# Patient Record
Sex: FEMALE | Race: WHITE | Employment: FULL TIME | URBAN - METROPOLITAN AREA
[De-identification: names, ages, dates, MRNs, and addresses within clinical notes are randomized per-mention and may not be internally consistent; named-entity substitution may affect disease eponyms.]

---

## 2017-01-16 RX ORDER — VENLAFAXINE 100 MG/1
TABLET ORAL
Qty: 60 TABLET | Refills: 0 | Status: SHIPPED | OUTPATIENT
Start: 2017-01-16 | End: 2017-02-13 | Stop reason: SDUPTHER

## 2017-01-27 ENCOUNTER — NURSE ONLY (OUTPATIENT)
Dept: FAMILY MEDICINE CLINIC | Age: 72
End: 2017-01-27

## 2017-01-27 DIAGNOSIS — D50.8 OTHER IRON DEFICIENCY ANEMIA: Primary | ICD-10-CM

## 2017-01-27 PROCEDURE — 96372 THER/PROPH/DIAG INJ SC/IM: CPT | Performed by: FAMILY MEDICINE

## 2017-01-27 RX ORDER — CYANOCOBALAMIN 1000 UG/ML
1000 INJECTION INTRAMUSCULAR; SUBCUTANEOUS
Status: DISCONTINUED | OUTPATIENT
Start: 2017-01-27 | End: 2019-11-05 | Stop reason: ALTCHOICE

## 2017-01-27 RX ADMIN — CYANOCOBALAMIN 1000 MCG: 1000 INJECTION INTRAMUSCULAR; SUBCUTANEOUS at 11:35

## 2017-03-03 ENCOUNTER — NURSE ONLY (OUTPATIENT)
Dept: FAMILY MEDICINE CLINIC | Age: 72
End: 2017-03-03

## 2017-03-03 DIAGNOSIS — Z98.84 S/P GASTRIC BYPASS: Primary | ICD-10-CM

## 2017-03-03 PROCEDURE — 96372 THER/PROPH/DIAG INJ SC/IM: CPT | Performed by: FAMILY MEDICINE

## 2017-03-03 RX ORDER — CYANOCOBALAMIN 1000 UG/ML
1000 INJECTION INTRAMUSCULAR; SUBCUTANEOUS
Status: DISCONTINUED | OUTPATIENT
Start: 2017-03-03 | End: 2019-11-05 | Stop reason: ALTCHOICE

## 2017-03-03 RX ADMIN — CYANOCOBALAMIN 1000 MCG: 1000 INJECTION INTRAMUSCULAR; SUBCUTANEOUS at 11:45

## 2017-03-10 ENCOUNTER — TELEPHONE (OUTPATIENT)
Dept: FAMILY MEDICINE CLINIC | Age: 72
End: 2017-03-10

## 2017-03-10 RX ORDER — AMOXICILLIN 875 MG/1
875 TABLET, COATED ORAL 2 TIMES DAILY
Qty: 20 TABLET | Refills: 0 | Status: SHIPPED | OUTPATIENT
Start: 2017-03-10 | End: 2017-03-20

## 2017-04-07 ENCOUNTER — NURSE ONLY (OUTPATIENT)
Dept: FAMILY MEDICINE CLINIC | Age: 72
End: 2017-04-07

## 2017-04-07 DIAGNOSIS — Z98.84 S/P GASTRIC BYPASS: Primary | ICD-10-CM

## 2017-04-07 PROCEDURE — 96372 THER/PROPH/DIAG INJ SC/IM: CPT | Performed by: FAMILY MEDICINE

## 2017-04-07 RX ORDER — CYANOCOBALAMIN 1000 UG/ML
1000 INJECTION INTRAMUSCULAR; SUBCUTANEOUS
Status: DISCONTINUED | OUTPATIENT
Start: 2017-04-07 | End: 2019-11-05 | Stop reason: ALTCHOICE

## 2017-04-07 RX ADMIN — CYANOCOBALAMIN 1000 MCG: 1000 INJECTION INTRAMUSCULAR; SUBCUTANEOUS at 11:29

## 2017-05-05 ENCOUNTER — NURSE ONLY (OUTPATIENT)
Dept: FAMILY MEDICINE CLINIC | Age: 72
End: 2017-05-05

## 2017-05-05 DIAGNOSIS — D50.8 OTHER IRON DEFICIENCY ANEMIA: Primary | ICD-10-CM

## 2017-05-05 PROCEDURE — 96372 THER/PROPH/DIAG INJ SC/IM: CPT | Performed by: FAMILY MEDICINE

## 2017-05-05 RX ORDER — CYANOCOBALAMIN 1000 UG/ML
1000 INJECTION INTRAMUSCULAR; SUBCUTANEOUS
Status: DISCONTINUED | OUTPATIENT
Start: 2017-05-05 | End: 2019-11-05 | Stop reason: ALTCHOICE

## 2017-05-05 RX ADMIN — CYANOCOBALAMIN 1000 MCG: 1000 INJECTION INTRAMUSCULAR; SUBCUTANEOUS at 11:42

## 2017-06-06 ENCOUNTER — OFFICE VISIT (OUTPATIENT)
Dept: FAMILY MEDICINE CLINIC | Age: 72
End: 2017-06-06

## 2017-06-06 VITALS
SYSTOLIC BLOOD PRESSURE: 110 MMHG | HEIGHT: 66 IN | HEART RATE: 109 BPM | BODY MASS INDEX: 26.21 KG/M2 | OXYGEN SATURATION: 97 % | WEIGHT: 163.1 LBS | DIASTOLIC BLOOD PRESSURE: 80 MMHG

## 2017-06-06 DIAGNOSIS — F32.A DEPRESSION, UNSPECIFIED DEPRESSION TYPE: ICD-10-CM

## 2017-06-06 DIAGNOSIS — D50.8 OTHER IRON DEFICIENCY ANEMIA: Primary | ICD-10-CM

## 2017-06-06 DIAGNOSIS — Z98.84 S/P GASTRIC BYPASS: ICD-10-CM

## 2017-06-06 DIAGNOSIS — R53.83 OTHER FATIGUE: ICD-10-CM

## 2017-06-06 PROCEDURE — 96372 THER/PROPH/DIAG INJ SC/IM: CPT | Performed by: FAMILY MEDICINE

## 2017-06-06 PROCEDURE — 99213 OFFICE O/P EST LOW 20 MIN: CPT | Performed by: FAMILY MEDICINE

## 2017-06-06 RX ORDER — CYANOCOBALAMIN 1000 UG/ML
1000 INJECTION INTRAMUSCULAR; SUBCUTANEOUS
Status: DISCONTINUED | OUTPATIENT
Start: 2017-06-06 | End: 2019-11-05 | Stop reason: ALTCHOICE

## 2017-06-06 RX ADMIN — CYANOCOBALAMIN 1000 MCG: 1000 INJECTION INTRAMUSCULAR; SUBCUTANEOUS at 10:56

## 2017-06-06 ASSESSMENT — ENCOUNTER SYMPTOMS
ABDOMINAL PAIN: 0
SORE THROAT: 0
VISUAL CHANGE: 0
COUGH: 0
NAUSEA: 0
CHANGE IN BOWEL HABIT: 0
VOMITING: 0
SWOLLEN GLANDS: 0

## 2017-11-27 ENCOUNTER — TELEPHONE (OUTPATIENT)
Dept: FAMILY MEDICINE CLINIC | Age: 72
End: 2017-11-27

## 2017-11-27 RX ORDER — VENLAFAXINE 100 MG/1
TABLET ORAL
Qty: 60 TABLET | Refills: 3 | Status: SHIPPED | OUTPATIENT
Start: 2017-11-27 | End: 2019-03-11

## 2017-11-27 NOTE — TELEPHONE ENCOUNTER
Patient is calling requesting a refill of venlafaxine (EFFEXOR) 100 MG tablet patient is out of her medication and her pharmacy has been telling her that they called this in 4 times.   Please advise  [place] [phone]

## 2017-12-27 ENCOUNTER — TELEPHONE (OUTPATIENT)
Dept: FAMILY MEDICINE CLINIC | Age: 72
End: 2017-12-27

## 2017-12-27 NOTE — TELEPHONE ENCOUNTER
Patient is calling stating that the manufacture of venlafaxine (EFFEXOR) 100 MG tablet and it is now making her sick. Patient would like to have a different medication be called in.  Please advise  Jony Jauregui [phone]

## 2018-01-03 ENCOUNTER — NURSE ONLY (OUTPATIENT)
Dept: FAMILY MEDICINE CLINIC | Age: 73
End: 2018-01-03

## 2018-01-03 DIAGNOSIS — D50.8 OTHER IRON DEFICIENCY ANEMIA: Primary | ICD-10-CM

## 2018-01-03 PROCEDURE — 96372 THER/PROPH/DIAG INJ SC/IM: CPT | Performed by: FAMILY MEDICINE

## 2018-01-03 RX ORDER — CYANOCOBALAMIN 1000 UG/ML
1000 INJECTION INTRAMUSCULAR; SUBCUTANEOUS
Status: DISCONTINUED | OUTPATIENT
Start: 2018-01-03 | End: 2019-11-05 | Stop reason: ALTCHOICE

## 2018-01-03 RX ADMIN — CYANOCOBALAMIN 1000 MCG: 1000 INJECTION INTRAMUSCULAR; SUBCUTANEOUS at 10:06

## 2018-02-02 ENCOUNTER — OFFICE VISIT (OUTPATIENT)
Dept: FAMILY MEDICINE CLINIC | Age: 73
End: 2018-02-02

## 2018-02-02 VITALS
OXYGEN SATURATION: 95 % | BODY MASS INDEX: 29.54 KG/M2 | SYSTOLIC BLOOD PRESSURE: 120 MMHG | HEART RATE: 64 BPM | DIASTOLIC BLOOD PRESSURE: 80 MMHG | HEIGHT: 66 IN | WEIGHT: 183.8 LBS

## 2018-02-02 DIAGNOSIS — Z00.00 WELL ADULT EXAM: Primary | ICD-10-CM

## 2018-02-02 DIAGNOSIS — Z98.84 S/P GASTRIC BYPASS: ICD-10-CM

## 2018-02-02 DIAGNOSIS — D50.9 IRON DEFICIENCY ANEMIA, UNSPECIFIED IRON DEFICIENCY ANEMIA TYPE: ICD-10-CM

## 2018-02-02 DIAGNOSIS — F32.5 MAJOR DEPRESSIVE DISORDER WITH SINGLE EPISODE, IN FULL REMISSION (HCC): ICD-10-CM

## 2018-02-02 DIAGNOSIS — Z87.891 EX-SMOKER: ICD-10-CM

## 2018-02-02 LAB
A/G RATIO: 2 (ref 1.1–2.2)
ALBUMIN SERPL-MCNC: 4.4 G/DL (ref 3.4–5)
ALP BLD-CCNC: 63 U/L (ref 40–129)
ALT SERPL-CCNC: 11 U/L (ref 10–40)
ANION GAP SERPL CALCULATED.3IONS-SCNC: 12 MMOL/L (ref 3–16)
AST SERPL-CCNC: 21 U/L (ref 15–37)
BASOPHILS ABSOLUTE: 0 K/UL (ref 0–0.2)
BASOPHILS RELATIVE PERCENT: 1.2 %
BILIRUB SERPL-MCNC: 0.3 MG/DL (ref 0–1)
BUN BLDV-MCNC: 12 MG/DL (ref 7–20)
CALCIUM SERPL-MCNC: 9 MG/DL (ref 8.3–10.6)
CHLORIDE BLD-SCNC: 103 MMOL/L (ref 99–110)
CO2: 28 MMOL/L (ref 21–32)
CREAT SERPL-MCNC: 0.7 MG/DL (ref 0.6–1.2)
EOSINOPHILS ABSOLUTE: 0 K/UL (ref 0–0.6)
EOSINOPHILS RELATIVE PERCENT: 0.7 %
FOLATE: 9.58 NG/ML (ref 4.78–24.2)
GFR AFRICAN AMERICAN: >60
GFR NON-AFRICAN AMERICAN: >60
GLOBULIN: 2.2 G/DL
GLUCOSE BLD-MCNC: 74 MG/DL (ref 70–99)
HCT VFR BLD CALC: 34.7 % (ref 36–48)
HEMOGLOBIN: 11.1 G/DL (ref 12–16)
IRON SATURATION: 11 % (ref 15–50)
IRON: 49 UG/DL (ref 37–145)
LYMPHOCYTES ABSOLUTE: 1.4 K/UL (ref 1–5.1)
LYMPHOCYTES RELATIVE PERCENT: 39 %
MCH RBC QN AUTO: 27.4 PG (ref 26–34)
MCHC RBC AUTO-ENTMCNC: 31.9 G/DL (ref 31–36)
MCV RBC AUTO: 85.9 FL (ref 80–100)
MONOCYTES ABSOLUTE: 0.5 K/UL (ref 0–1.3)
MONOCYTES RELATIVE PERCENT: 13.3 %
NEUTROPHILS ABSOLUTE: 1.6 K/UL (ref 1.7–7.7)
NEUTROPHILS RELATIVE PERCENT: 45.8 %
PDW BLD-RTO: 15.5 % (ref 12.4–15.4)
PLATELET # BLD: 322 K/UL (ref 135–450)
PMV BLD AUTO: 6.7 FL (ref 5–10.5)
POTASSIUM SERPL-SCNC: 5.1 MMOL/L (ref 3.5–5.1)
RBC # BLD: 4.04 M/UL (ref 4–5.2)
SODIUM BLD-SCNC: 143 MMOL/L (ref 136–145)
TOTAL IRON BINDING CAPACITY: 435 UG/DL (ref 260–445)
TOTAL PROTEIN: 6.6 G/DL (ref 6.4–8.2)
VITAMIN B-12: 285 PG/ML (ref 211–911)
WBC # BLD: 3.5 K/UL (ref 4–11)

## 2018-02-02 PROCEDURE — 99213 OFFICE O/P EST LOW 20 MIN: CPT | Performed by: FAMILY MEDICINE

## 2018-02-02 PROCEDURE — 96372 THER/PROPH/DIAG INJ SC/IM: CPT | Performed by: FAMILY MEDICINE

## 2018-02-02 RX ORDER — CYANOCOBALAMIN 1000 UG/ML
1000 INJECTION INTRAMUSCULAR; SUBCUTANEOUS
Status: DISCONTINUED | OUTPATIENT
Start: 2018-02-02 | End: 2019-11-05 | Stop reason: ALTCHOICE

## 2018-02-02 RX ADMIN — CYANOCOBALAMIN 1000 MCG: 1000 INJECTION INTRAMUSCULAR; SUBCUTANEOUS at 13:14

## 2018-02-02 ASSESSMENT — PATIENT HEALTH QUESTIONNAIRE - PHQ9
SUM OF ALL RESPONSES TO PHQ QUESTIONS 1-9: 0
2. FEELING DOWN, DEPRESSED OR HOPELESS: 0
1. LITTLE INTEREST OR PLEASURE IN DOING THINGS: 0
SUM OF ALL RESPONSES TO PHQ9 QUESTIONS 1 & 2: 0

## 2018-02-02 ASSESSMENT — ENCOUNTER SYMPTOMS
COUGH: 0
CHANGE IN BOWEL HABIT: 0
VISUAL CHANGE: 0
SORE THROAT: 0
VOMITING: 0
ABDOMINAL PAIN: 0
SWOLLEN GLANDS: 0
NAUSEA: 0

## 2018-02-05 ENCOUNTER — TELEPHONE (OUTPATIENT)
Dept: FAMILY MEDICINE CLINIC | Age: 73
End: 2018-02-05

## 2018-02-05 NOTE — TELEPHONE ENCOUNTER
[address]    PT missed Sari's call with test results. Please call again. OK to leave detailed voice mail with info. .the patient lives alone and there are no privacy concerns    Last seen 2/2/2018

## 2018-02-12 ENCOUNTER — HOSPITAL ENCOUNTER (OUTPATIENT)
Dept: CT IMAGING | Age: 73
Discharge: HOME | End: 2018-02-12
Attending: FAMILY MEDICINE | Admitting: FAMILY MEDICINE

## 2018-02-12 DIAGNOSIS — Z87.891 EX-SMOKER: ICD-10-CM

## 2018-02-12 DIAGNOSIS — Z87.891 PERSONAL HISTORY OF NICOTINE DEPENDENCE: ICD-10-CM

## 2018-03-02 ENCOUNTER — NURSE ONLY (OUTPATIENT)
Dept: FAMILY MEDICINE CLINIC | Age: 73
End: 2018-03-02

## 2018-03-02 DIAGNOSIS — D50.9 IRON DEFICIENCY ANEMIA, UNSPECIFIED IRON DEFICIENCY ANEMIA TYPE: Primary | ICD-10-CM

## 2018-03-02 DIAGNOSIS — R53.83 OTHER FATIGUE: ICD-10-CM

## 2018-03-02 PROCEDURE — 96372 THER/PROPH/DIAG INJ SC/IM: CPT | Performed by: FAMILY MEDICINE

## 2018-03-02 RX ORDER — CYANOCOBALAMIN 1000 UG/ML
1000 INJECTION INTRAMUSCULAR; SUBCUTANEOUS
Status: DISCONTINUED | OUTPATIENT
Start: 2018-03-02 | End: 2019-11-05 | Stop reason: ALTCHOICE

## 2018-03-02 RX ORDER — ESCITALOPRAM OXALATE 10 MG/1
10 TABLET ORAL DAILY
Qty: 30 TABLET | Refills: 3 | Status: SHIPPED | OUTPATIENT
Start: 2018-03-02 | End: 2018-07-03 | Stop reason: SDUPTHER

## 2018-03-02 RX ADMIN — CYANOCOBALAMIN 1000 MCG: 1000 INJECTION INTRAMUSCULAR; SUBCUTANEOUS at 11:01

## 2018-04-03 ENCOUNTER — NURSE ONLY (OUTPATIENT)
Dept: FAMILY MEDICINE CLINIC | Age: 73
End: 2018-04-03

## 2018-04-03 DIAGNOSIS — R53.83 OTHER FATIGUE: Primary | ICD-10-CM

## 2018-04-03 DIAGNOSIS — D50.9 IRON DEFICIENCY ANEMIA, UNSPECIFIED IRON DEFICIENCY ANEMIA TYPE: ICD-10-CM

## 2018-04-03 PROCEDURE — 96372 THER/PROPH/DIAG INJ SC/IM: CPT | Performed by: FAMILY MEDICINE

## 2018-04-03 RX ORDER — CYANOCOBALAMIN 1000 UG/ML
1000 INJECTION INTRAMUSCULAR; SUBCUTANEOUS
Status: DISCONTINUED | OUTPATIENT
Start: 2018-04-03 | End: 2019-11-05 | Stop reason: ALTCHOICE

## 2018-04-03 RX ADMIN — CYANOCOBALAMIN 1000 MCG: 1000 INJECTION INTRAMUSCULAR; SUBCUTANEOUS at 10:50

## 2018-04-11 ENCOUNTER — TELEPHONE (OUTPATIENT)
Dept: FAMILY MEDICINE CLINIC | Age: 73
End: 2018-04-11

## 2018-04-12 RX ORDER — SULFAMETHOXAZOLE AND TRIMETHOPRIM 800; 160 MG/1; MG/1
1 TABLET ORAL 2 TIMES DAILY
Qty: 6 TABLET | Refills: 0 | Status: SHIPPED | OUTPATIENT
Start: 2018-04-12 | End: 2018-04-15

## 2018-04-20 ENCOUNTER — TELEPHONE (OUTPATIENT)
Dept: CASE MANAGEMENT | Age: 73
End: 2018-04-20

## 2018-05-03 ENCOUNTER — NURSE ONLY (OUTPATIENT)
Dept: FAMILY MEDICINE CLINIC | Age: 73
End: 2018-05-03

## 2018-05-03 DIAGNOSIS — E53.8 B12 DEFICIENCY: Primary | ICD-10-CM

## 2018-05-03 PROCEDURE — 96372 THER/PROPH/DIAG INJ SC/IM: CPT | Performed by: FAMILY MEDICINE

## 2018-05-03 RX ORDER — CYANOCOBALAMIN 1000 UG/ML
1000 INJECTION INTRAMUSCULAR; SUBCUTANEOUS
Status: DISCONTINUED | OUTPATIENT
Start: 2018-05-03 | End: 2019-11-05 | Stop reason: ALTCHOICE

## 2018-05-03 RX ADMIN — CYANOCOBALAMIN 1000 MCG: 1000 INJECTION INTRAMUSCULAR; SUBCUTANEOUS at 10:26

## 2018-05-18 ENCOUNTER — CARE COORDINATION (OUTPATIENT)
Dept: CARE COORDINATION | Age: 73
End: 2018-05-18

## 2018-05-18 ENCOUNTER — OFFICE VISIT (OUTPATIENT)
Dept: FAMILY MEDICINE CLINIC | Age: 73
End: 2018-05-18

## 2018-05-18 VITALS
WEIGHT: 187.5 LBS | OXYGEN SATURATION: 95 % | HEIGHT: 66 IN | HEART RATE: 72 BPM | SYSTOLIC BLOOD PRESSURE: 120 MMHG | BODY MASS INDEX: 30.13 KG/M2 | DIASTOLIC BLOOD PRESSURE: 80 MMHG

## 2018-05-18 DIAGNOSIS — N30.01 ACUTE CYSTITIS WITH HEMATURIA: ICD-10-CM

## 2018-05-18 DIAGNOSIS — R73.9 HYPERGLYCEMIA: Primary | ICD-10-CM

## 2018-05-18 DIAGNOSIS — R35.0 URINARY FREQUENCY: ICD-10-CM

## 2018-05-18 PROCEDURE — 99213 OFFICE O/P EST LOW 20 MIN: CPT | Performed by: FAMILY MEDICINE

## 2018-05-18 PROCEDURE — 81000 URINALYSIS NONAUTO W/SCOPE: CPT | Performed by: FAMILY MEDICINE

## 2018-05-18 RX ORDER — CEPHALEXIN 500 MG/1
500 CAPSULE ORAL 3 TIMES DAILY
Qty: 21 CAPSULE | Refills: 0 | Status: SHIPPED | OUTPATIENT
Start: 2018-05-18 | End: 2018-05-20

## 2018-05-18 ASSESSMENT — ENCOUNTER SYMPTOMS
VOMITING: 0
NAUSEA: 0

## 2018-05-19 LAB
ANION GAP SERPL CALCULATED.3IONS-SCNC: 13 MMOL/L (ref 3–16)
BUN BLDV-MCNC: 13 MG/DL (ref 7–20)
CALCIUM SERPL-MCNC: 9 MG/DL (ref 8.3–10.6)
CHLORIDE BLD-SCNC: 103 MMOL/L (ref 99–110)
CO2: 26 MMOL/L (ref 21–32)
CREAT SERPL-MCNC: 0.7 MG/DL (ref 0.6–1.2)
ESTIMATED AVERAGE GLUCOSE: 111.2 MG/DL
GFR AFRICAN AMERICAN: >60
GFR NON-AFRICAN AMERICAN: >60
GLUCOSE BLD-MCNC: 99 MG/DL (ref 70–99)
HBA1C MFR BLD: 5.5 %
POTASSIUM SERPL-SCNC: 4.3 MMOL/L (ref 3.5–5.1)
SODIUM BLD-SCNC: 142 MMOL/L (ref 136–145)

## 2018-05-20 LAB
ORGANISM: ABNORMAL
URINE CULTURE, ROUTINE: ABNORMAL
URINE CULTURE, ROUTINE: ABNORMAL

## 2018-05-20 RX ORDER — CIPROFLOXACIN 500 MG/1
500 TABLET, FILM COATED ORAL 2 TIMES DAILY
Qty: 20 TABLET | Refills: 0 | Status: SHIPPED | OUTPATIENT
Start: 2018-05-20 | End: 2018-05-30

## 2018-06-01 ENCOUNTER — CARE COORDINATION (OUTPATIENT)
Dept: CARE COORDINATION | Age: 73
End: 2018-06-01

## 2018-06-04 ENCOUNTER — CARE COORDINATION (OUTPATIENT)
Dept: CARE COORDINATION | Age: 73
End: 2018-06-04

## 2018-06-04 ENCOUNTER — NURSE ONLY (OUTPATIENT)
Dept: FAMILY MEDICINE CLINIC | Age: 73
End: 2018-06-04

## 2018-06-04 DIAGNOSIS — E53.8 B12 DEFICIENCY: Primary | ICD-10-CM

## 2018-06-04 PROCEDURE — 96372 THER/PROPH/DIAG INJ SC/IM: CPT | Performed by: FAMILY MEDICINE

## 2018-06-04 RX ORDER — CYANOCOBALAMIN 1000 UG/ML
1000 INJECTION INTRAMUSCULAR; SUBCUTANEOUS ONCE
Status: COMPLETED | OUTPATIENT
Start: 2018-06-04 | End: 2018-06-04

## 2018-06-04 RX ADMIN — CYANOCOBALAMIN 1000 MCG: 1000 INJECTION INTRAMUSCULAR; SUBCUTANEOUS at 10:13

## 2018-07-02 ENCOUNTER — HOSPITAL ENCOUNTER (OUTPATIENT)
Dept: MAMMOGRAPHY | Age: 73
Discharge: HOME | End: 2018-07-02
Attending: FAMILY MEDICINE | Admitting: FAMILY MEDICINE

## 2018-07-02 DIAGNOSIS — Z12.31 VISIT FOR SCREENING MAMMOGRAM: ICD-10-CM

## 2018-07-03 RX ORDER — ESCITALOPRAM OXALATE 10 MG/1
10 TABLET ORAL DAILY
Qty: 30 TABLET | Refills: 0 | Status: SHIPPED | OUTPATIENT
Start: 2018-07-03 | End: 2018-07-31 | Stop reason: SDUPTHER

## 2018-07-13 ENCOUNTER — CARE COORDINATION (OUTPATIENT)
Dept: CARE COORDINATION | Age: 73
End: 2018-07-13

## 2018-07-13 NOTE — CARE COORDINATION
Ambulatory Care Coordination Note  7/13/2018  CM Risk Score: 0  Tabitha Mortality Risk Score: 6.55    ACC: Fay Lake, RN    Summary Note: Called and spoke with pt. Pt requesting to dc working with [facility]. Care Coordination Interventions    Program Enrollment:  Rising Risk  Referral from Primary Care Provider:  Yes  Suggested Interventions and Community Resources  Diabetes Education:  Completed (Comment: reports hypoglycemia symptoms at times)  Disease Association: In Process  Registered Dietician:  Declined  Zone Management Tools:  Completed         Goals Addressed     None          Prior to Admission medications    Medication Sig Start Date End Date Taking? Authorizing Provider   escitalopram (LEXAPRO) 10 MG tablet TAKE 1 TABLET BY MOUTH DAILY 7/3/18   Armando Cox MD   [facility] 100 MG tablet TAKE 1 TABLET BY MOUTH TWICE DAILY 11/27/17   Armando Cox MD       No future appointments.

## 2018-07-17 ENCOUNTER — NURSE ONLY (OUTPATIENT)
Dept: FAMILY MEDICINE CLINIC | Age: 73
End: 2018-07-17

## 2018-07-17 DIAGNOSIS — R53.83 OTHER FATIGUE: Primary | ICD-10-CM

## 2018-07-17 PROCEDURE — 96372 THER/PROPH/DIAG INJ SC/IM: CPT | Performed by: FAMILY MEDICINE

## 2018-07-17 RX ORDER — CYANOCOBALAMIN 1000 UG/ML
1000 INJECTION INTRAMUSCULAR; SUBCUTANEOUS
Status: SHIPPED | OUTPATIENT
Start: 2018-07-17 | End: 2019-07-12

## 2018-07-17 RX ADMIN — CYANOCOBALAMIN 1000 MCG: 1000 INJECTION INTRAMUSCULAR; SUBCUTANEOUS at 13:52

## 2018-07-31 RX ORDER — ESCITALOPRAM OXALATE 10 MG/1
10 TABLET ORAL DAILY
Qty: 30 TABLET | Refills: 5 | Status: SHIPPED | OUTPATIENT
Start: 2018-07-31 | End: 2018-08-01 | Stop reason: SDUPTHER

## 2018-08-01 RX ORDER — ESCITALOPRAM OXALATE 10 MG/1
10 TABLET ORAL DAILY
Qty: 90 TABLET | Refills: 3 | Status: SHIPPED | OUTPATIENT
Start: 2018-08-01 | End: 2019-08-02 | Stop reason: SDUPTHER

## 2018-08-15 ENCOUNTER — NURSE ONLY (OUTPATIENT)
Dept: FAMILY MEDICINE CLINIC | Age: 73
End: 2018-08-15

## 2018-08-15 DIAGNOSIS — D50.9 IRON DEFICIENCY ANEMIA, UNSPECIFIED IRON DEFICIENCY ANEMIA TYPE: Primary | ICD-10-CM

## 2018-08-15 PROCEDURE — 96372 THER/PROPH/DIAG INJ SC/IM: CPT | Performed by: FAMILY MEDICINE

## 2018-08-15 RX ORDER — CYANOCOBALAMIN 1000 UG/ML
1000 INJECTION INTRAMUSCULAR; SUBCUTANEOUS
Status: SHIPPED | OUTPATIENT
Start: 2018-08-15 | End: 2019-08-10

## 2018-08-15 RX ADMIN — CYANOCOBALAMIN 1000 MCG: 1000 INJECTION INTRAMUSCULAR; SUBCUTANEOUS at 10:33

## 2018-09-14 ENCOUNTER — NURSE ONLY (OUTPATIENT)
Dept: FAMILY MEDICINE CLINIC | Age: 73
End: 2018-09-14

## 2018-09-14 DIAGNOSIS — E53.8 B12 DEFICIENCY: Primary | ICD-10-CM

## 2018-09-14 PROCEDURE — 96372 THER/PROPH/DIAG INJ SC/IM: CPT | Performed by: FAMILY MEDICINE

## 2018-09-14 RX ORDER — CYANOCOBALAMIN 1000 UG/ML
1000 INJECTION INTRAMUSCULAR; SUBCUTANEOUS
Status: SHIPPED | OUTPATIENT
Start: 2018-09-14 | End: 2019-09-09

## 2018-09-14 RX ADMIN — CYANOCOBALAMIN 1000 MCG: 1000 INJECTION INTRAMUSCULAR; SUBCUTANEOUS at 11:33

## 2018-09-26 ENCOUNTER — TELEPHONE (OUTPATIENT)
Dept: CASE MANAGEMENT | Age: 73
End: 2018-09-26

## 2018-10-15 ENCOUNTER — NURSE ONLY (OUTPATIENT)
Dept: FAMILY MEDICINE CLINIC | Age: 73
End: 2018-10-15
Payer: MEDICARE

## 2018-10-15 DIAGNOSIS — D50.9 IRON DEFICIENCY ANEMIA, UNSPECIFIED IRON DEFICIENCY ANEMIA TYPE: Primary | ICD-10-CM

## 2018-10-15 PROCEDURE — 96372 THER/PROPH/DIAG INJ SC/IM: CPT | Performed by: FAMILY MEDICINE

## 2018-10-15 RX ORDER — CYANOCOBALAMIN 1000 UG/ML
1000 INJECTION INTRAMUSCULAR; SUBCUTANEOUS
Status: DISCONTINUED | OUTPATIENT
Start: 2018-10-15 | End: 2019-11-05

## 2018-10-15 RX ADMIN — CYANOCOBALAMIN 1000 MCG: 1000 INJECTION INTRAMUSCULAR; SUBCUTANEOUS at 10:42

## 2018-11-12 ENCOUNTER — NURSE ONLY (OUTPATIENT)
Dept: FAMILY MEDICINE CLINIC | Age: 73
End: 2018-11-12
Payer: MEDICARE

## 2018-11-12 PROCEDURE — 96372 THER/PROPH/DIAG INJ SC/IM: CPT | Performed by: FAMILY MEDICINE

## 2018-11-12 RX ADMIN — CYANOCOBALAMIN 1000 MCG: 1000 INJECTION INTRAMUSCULAR; SUBCUTANEOUS at 13:20

## 2019-01-09 ENCOUNTER — OFFICE VISIT (OUTPATIENT)
Dept: FAMILY MEDICINE CLINIC | Age: 74
End: 2019-01-09
Payer: MEDICARE

## 2019-01-09 VITALS
HEART RATE: 72 BPM | SYSTOLIC BLOOD PRESSURE: 134 MMHG | DIASTOLIC BLOOD PRESSURE: 76 MMHG | OXYGEN SATURATION: 97 % | BODY MASS INDEX: 31.47 KG/M2 | WEIGHT: 195 LBS

## 2019-01-09 DIAGNOSIS — F32.5 MAJOR DEPRESSIVE DISORDER, SINGLE EPISODE, IN FULL REMISSION (HCC): ICD-10-CM

## 2019-01-09 DIAGNOSIS — F32.A DEPRESSION, UNSPECIFIED DEPRESSION TYPE: ICD-10-CM

## 2019-01-09 DIAGNOSIS — R53.83 OTHER FATIGUE: ICD-10-CM

## 2019-01-09 PROCEDURE — 99213 OFFICE O/P EST LOW 20 MIN: CPT | Performed by: FAMILY MEDICINE

## 2019-01-09 PROCEDURE — 96372 THER/PROPH/DIAG INJ SC/IM: CPT | Performed by: FAMILY MEDICINE

## 2019-01-09 RX ORDER — CYANOCOBALAMIN 1000 UG/ML
1000 INJECTION INTRAMUSCULAR; SUBCUTANEOUS ONCE
Status: COMPLETED | OUTPATIENT
Start: 2019-01-09 | End: 2019-01-09

## 2019-01-09 RX ADMIN — CYANOCOBALAMIN 1000 MCG: 1000 INJECTION INTRAMUSCULAR; SUBCUTANEOUS at 12:17

## 2019-01-09 ASSESSMENT — ENCOUNTER SYMPTOMS
CHANGE IN BOWEL HABIT: 0
NAUSEA: 0
SORE THROAT: 0
ABDOMINAL PAIN: 0
VISUAL CHANGE: 0
VOMITING: 0
SWOLLEN GLANDS: 0
COUGH: 0

## 2019-02-11 ENCOUNTER — OFFICE VISIT (OUTPATIENT)
Dept: ENT CLINIC | Age: 74
End: 2019-02-11
Payer: MEDICARE

## 2019-02-11 ENCOUNTER — NURSE ONLY (OUTPATIENT)
Dept: FAMILY MEDICINE CLINIC | Age: 74
End: 2019-02-11
Payer: MEDICARE

## 2019-02-11 VITALS
WEIGHT: 194.85 LBS | DIASTOLIC BLOOD PRESSURE: 92 MMHG | HEIGHT: 70 IN | HEART RATE: 84 BPM | BODY MASS INDEX: 27.89 KG/M2 | SYSTOLIC BLOOD PRESSURE: 149 MMHG

## 2019-02-11 DIAGNOSIS — H60.392 OTHER INFECTIVE OTITIS EXTERNA OF LEFT EAR, UNSPECIFIED CHRONICITY: ICD-10-CM

## 2019-02-11 DIAGNOSIS — J31.0 CHRONIC RHINITIS: Primary | ICD-10-CM

## 2019-02-11 DIAGNOSIS — D50.9 IRON DEFICIENCY ANEMIA, UNSPECIFIED IRON DEFICIENCY ANEMIA TYPE: Primary | ICD-10-CM

## 2019-02-11 DIAGNOSIS — H69.82 DYSFUNCTION OF LEFT EUSTACHIAN TUBE: ICD-10-CM

## 2019-02-11 PROCEDURE — 96372 THER/PROPH/DIAG INJ SC/IM: CPT | Performed by: FAMILY MEDICINE

## 2019-02-11 PROCEDURE — 99204 OFFICE O/P NEW MOD 45 MIN: CPT | Performed by: OTOLARYNGOLOGY

## 2019-02-11 RX ORDER — CYANOCOBALAMIN 1000 UG/ML
1000 INJECTION INTRAMUSCULAR; SUBCUTANEOUS
Status: DISCONTINUED | OUTPATIENT
Start: 2019-02-11 | End: 2019-11-05 | Stop reason: ALTCHOICE

## 2019-02-11 RX ADMIN — CYANOCOBALAMIN 1000 MCG: 1000 INJECTION INTRAMUSCULAR; SUBCUTANEOUS at 10:32

## 2019-02-11 ASSESSMENT — ENCOUNTER SYMPTOMS
EYE DISCHARGE: 0
WHEEZING: 0
DIARRHEA: 0
BACK PAIN: 0
COUGH: 0
VOICE CHANGE: 0
VOMITING: 0
SINUS PAIN: 0
TROUBLE SWALLOWING: 0
SHORTNESS OF BREATH: 0
RHINORRHEA: 0
COLOR CHANGE: 0
STRIDOR: 0
SINUS PRESSURE: 0
SORE THROAT: 0
FACIAL SWELLING: 0
BLOOD IN STOOL: 0
CHEST TIGHTNESS: 0
CONSTIPATION: 0
CHOKING: 0
EYE PAIN: 0
APNEA: 0
NAUSEA: 0

## 2019-02-12 ENCOUNTER — TELEPHONE (OUTPATIENT)
Dept: ENT CLINIC | Age: 74
End: 2019-02-12

## 2019-02-12 DIAGNOSIS — H60.92 OTITIS EXTERNA OF LEFT EAR, UNSPECIFIED CHRONICITY, UNSPECIFIED TYPE: Primary | ICD-10-CM

## 2019-02-12 RX ORDER — NEOMYCIN SULFATE, POLYMYXIN B SULFATE AND HYDROCORTISONE 10; 3.5; 1 MG/ML; MG/ML; [USP'U]/ML
3 SUSPENSION/ DROPS AURICULAR (OTIC) 3 TIMES DAILY
Qty: 1 BOTTLE | Refills: 0 | Status: SHIPPED | OUTPATIENT
Start: 2019-02-12 | End: 2019-02-19

## 2019-02-20 ENCOUNTER — TELEPHONE (OUTPATIENT)
Dept: CASE MANAGEMENT | Age: 74
End: 2019-02-20

## 2019-03-11 ENCOUNTER — OFFICE VISIT (OUTPATIENT)
Dept: FAMILY MEDICINE CLINIC | Age: 74
End: 2019-03-11
Payer: MEDICARE

## 2019-03-11 VITALS
HEIGHT: 68 IN | DIASTOLIC BLOOD PRESSURE: 84 MMHG | HEART RATE: 84 BPM | SYSTOLIC BLOOD PRESSURE: 154 MMHG | BODY MASS INDEX: 30.89 KG/M2 | WEIGHT: 203.8 LBS | OXYGEN SATURATION: 97 %

## 2019-03-11 DIAGNOSIS — R53.83 OTHER FATIGUE: Primary | ICD-10-CM

## 2019-03-11 PROCEDURE — 96372 THER/PROPH/DIAG INJ SC/IM: CPT | Performed by: FAMILY MEDICINE

## 2019-04-11 ENCOUNTER — NURSE ONLY (OUTPATIENT)
Dept: FAMILY MEDICINE CLINIC | Age: 74
End: 2019-04-11
Payer: MEDICARE

## 2019-04-11 DIAGNOSIS — R53.82 CHRONIC FATIGUE: Primary | ICD-10-CM

## 2019-04-11 PROCEDURE — 96372 THER/PROPH/DIAG INJ SC/IM: CPT | Performed by: FAMILY MEDICINE

## 2019-04-11 RX ORDER — CYANOCOBALAMIN 1000 UG/ML
1000 INJECTION INTRAMUSCULAR; SUBCUTANEOUS ONCE
Status: COMPLETED | OUTPATIENT
Start: 2019-04-11 | End: 2019-04-11

## 2019-04-11 RX ADMIN — CYANOCOBALAMIN 1000 MCG: 1000 INJECTION INTRAMUSCULAR; SUBCUTANEOUS at 10:25

## 2019-05-16 ENCOUNTER — NURSE ONLY (OUTPATIENT)
Dept: FAMILY MEDICINE CLINIC | Age: 74
End: 2019-05-16
Payer: MEDICARE

## 2019-05-16 DIAGNOSIS — E53.8 B12 DEFICIENCY: Primary | ICD-10-CM

## 2019-05-16 PROCEDURE — 96372 THER/PROPH/DIAG INJ SC/IM: CPT | Performed by: FAMILY MEDICINE

## 2019-05-16 RX ORDER — CYANOCOBALAMIN 1000 UG/ML
1000 INJECTION INTRAMUSCULAR; SUBCUTANEOUS ONCE
Status: COMPLETED | OUTPATIENT
Start: 2019-05-16 | End: 2019-05-16

## 2019-05-16 RX ADMIN — CYANOCOBALAMIN 1000 MCG: 1000 INJECTION INTRAMUSCULAR; SUBCUTANEOUS at 10:27

## 2019-06-14 ENCOUNTER — NURSE ONLY (OUTPATIENT)
Dept: FAMILY MEDICINE CLINIC | Age: 74
End: 2019-06-14
Payer: MEDICARE

## 2019-06-14 DIAGNOSIS — E53.8 B12 DEFICIENCY: Primary | ICD-10-CM

## 2019-06-14 PROCEDURE — 96372 THER/PROPH/DIAG INJ SC/IM: CPT | Performed by: FAMILY MEDICINE

## 2019-06-14 RX ORDER — CYANOCOBALAMIN 1000 UG/ML
1000 INJECTION INTRAMUSCULAR; SUBCUTANEOUS ONCE
Status: CANCELLED | OUTPATIENT
Start: 2019-06-14 | End: 2019-06-14

## 2019-06-14 RX ADMIN — CYANOCOBALAMIN 1000 MCG: 1000 INJECTION INTRAMUSCULAR; SUBCUTANEOUS at 09:34

## 2019-08-03 RX ORDER — ESCITALOPRAM OXALATE 10 MG/1
10 TABLET ORAL DAILY
Qty: 90 TABLET | Refills: 0 | Status: SHIPPED | OUTPATIENT
Start: 2019-08-03 | End: 2019-10-29 | Stop reason: SDUPTHER

## 2019-08-26 ENCOUNTER — NURSE ONLY (OUTPATIENT)
Dept: FAMILY MEDICINE CLINIC | Age: 74
End: 2019-08-26
Payer: MEDICARE

## 2019-08-26 DIAGNOSIS — E53.8 B12 DEFICIENCY: Primary | ICD-10-CM

## 2019-08-26 DIAGNOSIS — R53.83 OTHER FATIGUE: ICD-10-CM

## 2019-08-26 PROCEDURE — 96372 THER/PROPH/DIAG INJ SC/IM: CPT | Performed by: FAMILY MEDICINE

## 2019-08-26 RX ORDER — CYANOCOBALAMIN 1000 UG/ML
1000 INJECTION INTRAMUSCULAR; SUBCUTANEOUS ONCE
Status: COMPLETED | OUTPATIENT
Start: 2019-08-26 | End: 2019-08-26

## 2019-08-26 RX ADMIN — CYANOCOBALAMIN 1000 MCG: 1000 INJECTION INTRAMUSCULAR; SUBCUTANEOUS at 10:03

## 2019-09-16 ENCOUNTER — TELEPHONE (OUTPATIENT)
Dept: FAMILY MEDICINE CLINIC | Age: 74
End: 2019-09-16

## 2019-09-16 RX ORDER — FLUCONAZOLE 150 MG/1
150 TABLET ORAL ONCE
Qty: 2 TABLET | Refills: 0 | Status: SHIPPED | OUTPATIENT
Start: 2019-09-16 | End: 2019-10-10 | Stop reason: SDUPTHER

## 2019-09-24 ENCOUNTER — NURSE ONLY (OUTPATIENT)
Dept: FAMILY MEDICINE CLINIC | Age: 74
End: 2019-09-24
Payer: MEDICARE

## 2019-09-24 DIAGNOSIS — E53.8 B12 DEFICIENCY: Primary | ICD-10-CM

## 2019-09-24 PROCEDURE — 96372 THER/PROPH/DIAG INJ SC/IM: CPT | Performed by: FAMILY MEDICINE

## 2019-09-24 RX ORDER — CYANOCOBALAMIN 1000 UG/ML
1000 INJECTION INTRAMUSCULAR; SUBCUTANEOUS
Status: DISCONTINUED | OUTPATIENT
Start: 2019-09-24 | End: 2019-11-05 | Stop reason: ALTCHOICE

## 2019-09-24 RX ADMIN — CYANOCOBALAMIN 1000 MCG: 1000 INJECTION INTRAMUSCULAR; SUBCUTANEOUS at 09:58

## 2019-09-24 ASSESSMENT — PATIENT HEALTH QUESTIONNAIRE - PHQ9
2. FEELING DOWN, DEPRESSED OR HOPELESS: 0
1. LITTLE INTEREST OR PLEASURE IN DOING THINGS: 0
SUM OF ALL RESPONSES TO PHQ9 QUESTIONS 1 & 2: 0
SUM OF ALL RESPONSES TO PHQ QUESTIONS 1-9: 0
SUM OF ALL RESPONSES TO PHQ QUESTIONS 1-9: 0

## 2019-10-10 ENCOUNTER — OFFICE VISIT (OUTPATIENT)
Dept: FAMILY MEDICINE CLINIC | Age: 74
End: 2019-10-10
Payer: MEDICARE

## 2019-10-10 ENCOUNTER — OFFICE VISIT (OUTPATIENT)
Dept: ENT CLINIC | Age: 74
End: 2019-10-10
Payer: MEDICARE

## 2019-10-10 VITALS
BODY MASS INDEX: 31.15 KG/M2 | DIASTOLIC BLOOD PRESSURE: 74 MMHG | WEIGHT: 193 LBS | SYSTOLIC BLOOD PRESSURE: 126 MMHG | OXYGEN SATURATION: 98 % | HEART RATE: 73 BPM

## 2019-10-10 VITALS
TEMPERATURE: 97.5 F | HEART RATE: 76 BPM | HEIGHT: 66 IN | BODY MASS INDEX: 31.15 KG/M2 | WEIGHT: 193.8 LBS | SYSTOLIC BLOOD PRESSURE: 153 MMHG | DIASTOLIC BLOOD PRESSURE: 90 MMHG

## 2019-10-10 DIAGNOSIS — B37.31 YEAST VAGINITIS: Primary | ICD-10-CM

## 2019-10-10 DIAGNOSIS — H61.302 STENOSIS OF LEFT EXTERNAL AUDITORY CANAL: ICD-10-CM

## 2019-10-10 PROCEDURE — 99212 OFFICE O/P EST SF 10 MIN: CPT | Performed by: OTOLARYNGOLOGY

## 2019-10-10 PROCEDURE — 99213 OFFICE O/P EST LOW 20 MIN: CPT | Performed by: FAMILY MEDICINE

## 2019-10-10 RX ORDER — FLUCONAZOLE 150 MG/1
150 TABLET ORAL ONCE
Qty: 2 TABLET | Refills: 0 | Status: SHIPPED | OUTPATIENT
Start: 2019-10-10 | End: 2019-10-10

## 2019-10-10 ASSESSMENT — ENCOUNTER SYMPTOMS
SORE THROAT: 0
RHINORRHEA: 0
COUGH: 0
VOICE CHANGE: 0
CHOKING: 0
FACIAL SWELLING: 0
EYE PAIN: 0
EYE REDNESS: 0
SINUS PRESSURE: 0
NAUSEA: 0
SHORTNESS OF BREATH: 0
SINUS PAIN: 0
DIARRHEA: 0
EYE ITCHING: 0
TROUBLE SWALLOWING: 0

## 2019-10-17 ENCOUNTER — APPOINTMENT (OUTPATIENT)
Dept: CT IMAGING | Age: 74
End: 2019-10-17
Payer: MEDICARE

## 2019-10-17 ENCOUNTER — HOSPITAL ENCOUNTER (EMERGENCY)
Age: 74
Discharge: HOME | End: 2019-10-17
Attending: EMERGENCY MEDICINE
Payer: MEDICARE

## 2019-10-17 ENCOUNTER — APPOINTMENT (OUTPATIENT)
Dept: MRI IMAGING | Age: 74
End: 2019-10-17
Payer: MEDICARE

## 2019-10-17 VITALS
SYSTOLIC BLOOD PRESSURE: 163 MMHG | DIASTOLIC BLOOD PRESSURE: 83 MMHG | RESPIRATION RATE: 26 BRPM | OXYGEN SATURATION: 95 % | BODY MASS INDEX: 32.28 KG/M2 | HEART RATE: 91 BPM | WEIGHT: 200 LBS

## 2019-10-17 DIAGNOSIS — M54.2 NECK PAIN: Primary | ICD-10-CM

## 2019-10-17 LAB
EKG ATRIAL RATE: 87 BPM
EKG DIAGNOSIS: NORMAL
EKG P AXIS: 26 DEGREES
EKG P-R INTERVAL: 138 MS
EKG Q-T INTERVAL: 360 MS
EKG QRS DURATION: 86 MS
EKG QTC CALCULATION (BAZETT): 433 MS
EKG R AXIS: -12 DEGREES
EKG T AXIS: 9 DEGREES
EKG VENTRICULAR RATE: 87 BPM

## 2019-10-17 PROCEDURE — 6370000000 HC RX 637 (ALT 250 FOR IP): Performed by: STUDENT IN AN ORGANIZED HEALTH CARE EDUCATION/TRAINING PROGRAM

## 2019-10-17 PROCEDURE — 96374 THER/PROPH/DIAG INJ IV PUSH: CPT

## 2019-10-17 PROCEDURE — 72125 CT NECK SPINE W/O DYE: CPT

## 2019-10-17 PROCEDURE — 99284 EMERGENCY DEPT VISIT MOD MDM: CPT

## 2019-10-17 PROCEDURE — 72141 MRI NECK SPINE W/O DYE: CPT

## 2019-10-17 PROCEDURE — 6360000002 HC RX W HCPCS: Performed by: STUDENT IN AN ORGANIZED HEALTH CARE EDUCATION/TRAINING PROGRAM

## 2019-10-17 PROCEDURE — 93005 ELECTROCARDIOGRAM TRACING: CPT | Performed by: EMERGENCY MEDICINE

## 2019-10-17 RX ORDER — HYDROCODONE BITARTRATE AND ACETAMINOPHEN 5; 325 MG/1; MG/1
1 TABLET ORAL ONCE
Status: COMPLETED | OUTPATIENT
Start: 2019-10-17 | End: 2019-10-17

## 2019-10-17 RX ORDER — METHOCARBAMOL 500 MG/1
500 TABLET, FILM COATED ORAL 4 TIMES DAILY
Qty: 16 TABLET | Refills: 0 | Status: SHIPPED | OUTPATIENT
Start: 2019-10-17 | End: 2019-10-21

## 2019-10-17 RX ORDER — KETOROLAC TROMETHAMINE 30 MG/ML
15 INJECTION, SOLUTION INTRAMUSCULAR; INTRAVENOUS ONCE
Status: COMPLETED | OUTPATIENT
Start: 2019-10-17 | End: 2019-10-17

## 2019-10-17 RX ADMIN — KETOROLAC TROMETHAMINE 15 MG: 30 INJECTION, SOLUTION INTRAMUSCULAR at 14:34

## 2019-10-17 RX ADMIN — HYDROCODONE BITARTRATE AND ACETAMINOPHEN 1 TABLET: 5; 325 TABLET ORAL at 20:03

## 2019-10-17 ASSESSMENT — PAIN SCALES - GENERAL
PAINLEVEL_OUTOF10: 8

## 2019-10-17 ASSESSMENT — PAIN DESCRIPTION - LOCATION: LOCATION: NECK

## 2019-10-17 ASSESSMENT — PAIN DESCRIPTION - PAIN TYPE: TYPE: ACUTE PAIN

## 2019-10-18 ENCOUNTER — TELEPHONE (OUTPATIENT)
Dept: FAMILY MEDICINE CLINIC | Age: 74
End: 2019-10-18

## 2019-10-18 RX ORDER — NAPROXEN 500 MG/1
500 TABLET ORAL 2 TIMES DAILY WITH MEALS
Qty: 30 TABLET | Refills: 3 | Status: SHIPPED | OUTPATIENT
Start: 2019-10-18 | End: 2019-10-28 | Stop reason: SINTOL

## 2019-10-28 ENCOUNTER — OFFICE VISIT (OUTPATIENT)
Dept: FAMILY MEDICINE CLINIC | Age: 74
End: 2019-10-28
Payer: MEDICARE

## 2019-10-28 VITALS
BODY MASS INDEX: 30.44 KG/M2 | OXYGEN SATURATION: 98 % | SYSTOLIC BLOOD PRESSURE: 120 MMHG | DIASTOLIC BLOOD PRESSURE: 80 MMHG | HEART RATE: 72 BPM | WEIGHT: 189.4 LBS | HEIGHT: 66 IN

## 2019-10-28 DIAGNOSIS — E53.8 B12 DEFICIENCY: ICD-10-CM

## 2019-10-28 DIAGNOSIS — Z78.0 MENOPAUSE: ICD-10-CM

## 2019-10-28 DIAGNOSIS — Z01.818 PRE-OP EXAM: ICD-10-CM

## 2019-10-28 DIAGNOSIS — Z01.818 PRE-OP EXAM: Primary | ICD-10-CM

## 2019-10-28 DIAGNOSIS — M48.42XG: ICD-10-CM

## 2019-10-28 LAB
A/G RATIO: 2.4 (ref 1.1–2.2)
ALBUMIN SERPL-MCNC: 4.5 G/DL (ref 3.4–5)
ALP BLD-CCNC: 75 U/L (ref 40–129)
ALT SERPL-CCNC: 13 U/L (ref 10–40)
ANION GAP SERPL CALCULATED.3IONS-SCNC: 12 MMOL/L (ref 3–16)
AST SERPL-CCNC: 22 U/L (ref 15–37)
BASOPHILS ABSOLUTE: 0 K/UL (ref 0–0.2)
BASOPHILS RELATIVE PERCENT: 1.1 %
BILIRUB SERPL-MCNC: 0.3 MG/DL (ref 0–1)
BUN BLDV-MCNC: 16 MG/DL (ref 7–20)
CALCIUM SERPL-MCNC: 9.2 MG/DL (ref 8.3–10.6)
CHLORIDE BLD-SCNC: 102 MMOL/L (ref 99–110)
CO2: 25 MMOL/L (ref 21–32)
CREAT SERPL-MCNC: 0.6 MG/DL (ref 0.6–1.2)
EOSINOPHILS ABSOLUTE: 0.1 K/UL (ref 0–0.6)
EOSINOPHILS RELATIVE PERCENT: 2.4 %
GFR AFRICAN AMERICAN: >60
GFR NON-AFRICAN AMERICAN: >60
GLOBULIN: 1.9 G/DL
GLUCOSE BLD-MCNC: 105 MG/DL (ref 70–99)
HCT VFR BLD CALC: 34.3 % (ref 36–48)
HEMOGLOBIN: 11 G/DL (ref 12–16)
LYMPHOCYTES ABSOLUTE: 1.1 K/UL (ref 1–5.1)
LYMPHOCYTES RELATIVE PERCENT: 32.6 %
MCH RBC QN AUTO: 27.2 PG (ref 26–34)
MCHC RBC AUTO-ENTMCNC: 32.2 G/DL (ref 31–36)
MCV RBC AUTO: 84.5 FL (ref 80–100)
MONOCYTES ABSOLUTE: 0.4 K/UL (ref 0–1.3)
MONOCYTES RELATIVE PERCENT: 12.6 %
NEUTROPHILS ABSOLUTE: 1.8 K/UL (ref 1.7–7.7)
NEUTROPHILS RELATIVE PERCENT: 51.3 %
PDW BLD-RTO: 16.5 % (ref 12.4–15.4)
PLATELET # BLD: 369 K/UL (ref 135–450)
PMV BLD AUTO: 7.1 FL (ref 5–10.5)
POTASSIUM SERPL-SCNC: 4.8 MMOL/L (ref 3.5–5.1)
RBC # BLD: 4.06 M/UL (ref 4–5.2)
SODIUM BLD-SCNC: 139 MMOL/L (ref 136–145)
TOTAL PROTEIN: 6.4 G/DL (ref 6.4–8.2)
WBC # BLD: 3.5 K/UL (ref 4–11)

## 2019-10-28 PROCEDURE — 99214 OFFICE O/P EST MOD 30 MIN: CPT | Performed by: FAMILY MEDICINE

## 2019-10-28 PROCEDURE — 96372 THER/PROPH/DIAG INJ SC/IM: CPT | Performed by: FAMILY MEDICINE

## 2019-10-28 RX ORDER — CYANOCOBALAMIN 1000 UG/ML
1000 INJECTION INTRAMUSCULAR; SUBCUTANEOUS
Status: DISCONTINUED | OUTPATIENT
Start: 2019-10-28 | End: 2019-11-05

## 2019-10-28 RX ORDER — HYDROCODONE BITARTRATE AND ACETAMINOPHEN 5; 325 MG/1; MG/1
1 TABLET ORAL EVERY 6 HOURS PRN
Status: ON HOLD | COMMUNITY
End: 2019-11-08 | Stop reason: HOSPADM

## 2019-10-28 RX ADMIN — CYANOCOBALAMIN 1000 MCG: 1000 INJECTION INTRAMUSCULAR; SUBCUTANEOUS at 11:36

## 2019-10-29 RX ORDER — ESCITALOPRAM OXALATE 10 MG/1
10 TABLET ORAL DAILY
Qty: 90 TABLET | Refills: 0 | Status: SHIPPED | OUTPATIENT
Start: 2019-10-29 | End: 2020-01-31

## 2019-11-04 ENCOUNTER — HOSPITAL ENCOUNTER (OUTPATIENT)
Dept: GENERAL RADIOLOGY | Age: 74
Discharge: HOME | End: 2019-11-04
Payer: MEDICARE

## 2019-11-04 DIAGNOSIS — Z78.0 MENOPAUSE: ICD-10-CM

## 2019-11-04 PROCEDURE — 77080 DXA BONE DENSITY AXIAL: CPT

## 2019-11-06 ENCOUNTER — ANESTHESIA EVENT (OUTPATIENT)
Dept: OPERATING ROOM | Age: 74
DRG: 473 | End: 2019-11-06
Payer: MEDICARE

## 2019-11-07 ENCOUNTER — APPOINTMENT (OUTPATIENT)
Dept: CT IMAGING | Age: 74
DRG: 473 | End: 2019-11-07
Attending: NEUROLOGICAL SURGERY
Payer: MEDICARE

## 2019-11-07 ENCOUNTER — ANESTHESIA (OUTPATIENT)
Dept: OPERATING ROOM | Age: 74
DRG: 473 | End: 2019-11-07
Payer: MEDICARE

## 2019-11-07 ENCOUNTER — HOSPITAL ENCOUNTER (INPATIENT)
Age: 74
LOS: 2 days | Discharge: HOME | DRG: 473 | End: 2019-11-09
Attending: NEUROLOGICAL SURGERY | Admitting: NEUROLOGICAL SURGERY
Payer: MEDICARE

## 2019-11-07 VITALS — SYSTOLIC BLOOD PRESSURE: 112 MMHG | OXYGEN SATURATION: 96 % | DIASTOLIC BLOOD PRESSURE: 57 MMHG | TEMPERATURE: 94.5 F

## 2019-11-07 DIAGNOSIS — Z98.1 S/P CERVICAL SPINAL FUSION: Primary | ICD-10-CM

## 2019-11-07 PROBLEM — S12.100A ODONTOID FRACTURE, CLOSED, INITIAL ENCOUNTER (HCC): Status: ACTIVE | Noted: 2019-11-07

## 2019-11-07 LAB
ABO/RH: NORMAL
ANTIBODY SCREEN: NORMAL
APTT: 30.9 SEC (ref 26–36)
INR BLD: 1.01 (ref 0.86–1.14)
PROTHROMBIN TIME: 11.5 SEC (ref 9.8–13)

## 2019-11-07 PROCEDURE — 0RG1071 FUSION OF CERVICAL VERTEBRAL JOINT WITH AUTOLOGOUS TISSUE SUBSTITUTE, POSTERIOR APPROACH, POSTERIOR COLUMN, OPEN APPROACH: ICD-10-PCS | Performed by: NEUROLOGICAL SURGERY

## 2019-11-07 PROCEDURE — 2580000003 HC RX 258: Performed by: PHYSICIAN ASSISTANT

## 2019-11-07 PROCEDURE — 6360000002 HC RX W HCPCS: Performed by: NURSE ANESTHETIST, CERTIFIED REGISTERED

## 2019-11-07 PROCEDURE — 6360000002 HC RX W HCPCS: Performed by: NEUROLOGICAL SURGERY

## 2019-11-07 PROCEDURE — 7100000000 HC PACU RECOVERY - FIRST 15 MIN: Performed by: NEUROLOGICAL SURGERY

## 2019-11-07 PROCEDURE — 3600000004 HC SURGERY LEVEL 4 BASE: Performed by: NEUROLOGICAL SURGERY

## 2019-11-07 PROCEDURE — 3600000014 HC SURGERY LEVEL 4 ADDTL 15MIN: Performed by: NEUROLOGICAL SURGERY

## 2019-11-07 PROCEDURE — 3700000001 HC ADD 15 MINUTES (ANESTHESIA): Performed by: NEUROLOGICAL SURGERY

## 2019-11-07 PROCEDURE — 6360000002 HC RX W HCPCS: Performed by: PHYSICIAN ASSISTANT

## 2019-11-07 PROCEDURE — 2580000003 HC RX 258: Performed by: NURSE ANESTHETIST, CERTIFIED REGISTERED

## 2019-11-07 PROCEDURE — 86850 RBC ANTIBODY SCREEN: CPT

## 2019-11-07 PROCEDURE — 1200000000 HC SEMI PRIVATE

## 2019-11-07 PROCEDURE — 2500000003 HC RX 250 WO HCPCS: Performed by: NURSE ANESTHETIST, CERTIFIED REGISTERED

## 2019-11-07 PROCEDURE — 86901 BLOOD TYPING SEROLOGIC RH(D): CPT

## 2019-11-07 PROCEDURE — 2580000003 HC RX 258: Performed by: NEUROLOGICAL SURGERY

## 2019-11-07 PROCEDURE — 85610 PROTHROMBIN TIME: CPT

## 2019-11-07 PROCEDURE — 3700000000 HC ANESTHESIA ATTENDED CARE: Performed by: NEUROLOGICAL SURGERY

## 2019-11-07 PROCEDURE — 2709999900 HC NON-CHARGEABLE SUPPLY: Performed by: NEUROLOGICAL SURGERY

## 2019-11-07 PROCEDURE — 86900 BLOOD TYPING SEROLOGIC ABO: CPT

## 2019-11-07 PROCEDURE — 2500000003 HC RX 250 WO HCPCS: Performed by: ANESTHESIOLOGY

## 2019-11-07 PROCEDURE — 2580000003 HC RX 258: Performed by: ANESTHESIOLOGY

## 2019-11-07 PROCEDURE — 2500000003 HC RX 250 WO HCPCS: Performed by: NEUROLOGICAL SURGERY

## 2019-11-07 PROCEDURE — 2720000010 HC SURG SUPPLY STERILE: Performed by: NEUROLOGICAL SURGERY

## 2019-11-07 PROCEDURE — C1713 ANCHOR/SCREW BN/BN,TIS/BN: HCPCS | Performed by: NEUROLOGICAL SURGERY

## 2019-11-07 PROCEDURE — 7100000001 HC PACU RECOVERY - ADDTL 15 MIN: Performed by: NEUROLOGICAL SURGERY

## 2019-11-07 PROCEDURE — 6370000000 HC RX 637 (ALT 250 FOR IP): Performed by: PHYSICIAN ASSISTANT

## 2019-11-07 PROCEDURE — 6360000002 HC RX W HCPCS: Performed by: ANESTHESIOLOGY

## 2019-11-07 PROCEDURE — 85730 THROMBOPLASTIN TIME PARTIAL: CPT

## 2019-11-07 PROCEDURE — 77011 CT SCAN FOR LOCALIZATION: CPT

## 2019-11-07 DEVICE — SCREW SPNL OCCIPITOCERVICOTHORACIC TI INNR FOR 3.5MM ROD: Type: IMPLANTABLE DEVICE | Site: SPINE CERVICAL | Status: FUNCTIONAL

## 2019-11-07 DEVICE — IMPLANTABLE DEVICE: Type: IMPLANTABLE DEVICE | Status: FUNCTIONAL

## 2019-11-07 DEVICE — IMPLANTABLE DEVICE: Type: IMPLANTABLE DEVICE | Site: SPINE CERVICAL | Status: FUNCTIONAL

## 2019-11-07 RX ORDER — ONDANSETRON 2 MG/ML
4 INJECTION INTRAMUSCULAR; INTRAVENOUS
Status: DISCONTINUED | OUTPATIENT
Start: 2019-11-07 | End: 2019-11-07 | Stop reason: HOSPADM

## 2019-11-07 RX ORDER — SODIUM CHLORIDE 0.9 % (FLUSH) 0.9 %
10 SYRINGE (ML) INJECTION EVERY 12 HOURS SCHEDULED
Status: DISCONTINUED | OUTPATIENT
Start: 2019-11-07 | End: 2019-11-07 | Stop reason: HOSPADM

## 2019-11-07 RX ORDER — DOCUSATE SODIUM 100 MG/1
100 CAPSULE, LIQUID FILLED ORAL 2 TIMES DAILY
Status: DISCONTINUED | OUTPATIENT
Start: 2019-11-07 | End: 2019-11-08

## 2019-11-07 RX ORDER — HYDROMORPHONE HCL 110MG/55ML
PATIENT CONTROLLED ANALGESIA SYRINGE INTRAVENOUS PRN
Status: DISCONTINUED | OUTPATIENT
Start: 2019-11-07 | End: 2019-11-07 | Stop reason: SDUPTHER

## 2019-11-07 RX ORDER — PROPOFOL 10 MG/ML
INJECTION, EMULSION INTRAVENOUS CONTINUOUS PRN
Status: DISCONTINUED | OUTPATIENT
Start: 2019-11-07 | End: 2019-11-07 | Stop reason: SDUPTHER

## 2019-11-07 RX ORDER — LIDOCAINE HYDROCHLORIDE 10 MG/ML
1 INJECTION, SOLUTION EPIDURAL; INFILTRATION; INTRACAUDAL; PERINEURAL
Status: DISCONTINUED | OUTPATIENT
Start: 2019-11-07 | End: 2019-11-07 | Stop reason: HOSPADM

## 2019-11-07 RX ORDER — FENTANYL CITRATE 50 UG/ML
INJECTION, SOLUTION INTRAMUSCULAR; INTRAVENOUS PRN
Status: DISCONTINUED | OUTPATIENT
Start: 2019-11-07 | End: 2019-11-07 | Stop reason: SDUPTHER

## 2019-11-07 RX ORDER — OXYCODONE HYDROCHLORIDE AND ACETAMINOPHEN 5; 325 MG/1; MG/1
1 TABLET ORAL
Status: DISCONTINUED | OUTPATIENT
Start: 2019-11-07 | End: 2019-11-07 | Stop reason: HOSPADM

## 2019-11-07 RX ORDER — ONDANSETRON 2 MG/ML
4 INJECTION INTRAMUSCULAR; INTRAVENOUS EVERY 6 HOURS PRN
Status: DISCONTINUED | OUTPATIENT
Start: 2019-11-07 | End: 2019-11-09 | Stop reason: HOSPADM

## 2019-11-07 RX ORDER — SODIUM CHLORIDE, SODIUM LACTATE, POTASSIUM CHLORIDE, CALCIUM CHLORIDE 600; 310; 30; 20 MG/100ML; MG/100ML; MG/100ML; MG/100ML
INJECTION, SOLUTION INTRAVENOUS CONTINUOUS
Status: DISCONTINUED | OUTPATIENT
Start: 2019-11-07 | End: 2019-11-08

## 2019-11-07 RX ORDER — PROPOFOL 10 MG/ML
INJECTION, EMULSION INTRAVENOUS PRN
Status: DISCONTINUED | OUTPATIENT
Start: 2019-11-07 | End: 2019-11-07 | Stop reason: SDUPTHER

## 2019-11-07 RX ORDER — FENTANYL CITRATE 50 UG/ML
25 INJECTION, SOLUTION INTRAMUSCULAR; INTRAVENOUS EVERY 5 MIN PRN
Status: DISCONTINUED | OUTPATIENT
Start: 2019-11-07 | End: 2019-11-07 | Stop reason: HOSPADM

## 2019-11-07 RX ORDER — ONDANSETRON 2 MG/ML
INJECTION INTRAMUSCULAR; INTRAVENOUS PRN
Status: DISCONTINUED | OUTPATIENT
Start: 2019-11-07 | End: 2019-11-07 | Stop reason: SDUPTHER

## 2019-11-07 RX ORDER — DEXAMETHASONE SODIUM PHOSPHATE 4 MG/ML
INJECTION, SOLUTION INTRA-ARTICULAR; INTRALESIONAL; INTRAMUSCULAR; INTRAVENOUS; SOFT TISSUE PRN
Status: DISCONTINUED | OUTPATIENT
Start: 2019-11-07 | End: 2019-11-07 | Stop reason: SDUPTHER

## 2019-11-07 RX ORDER — HYDRALAZINE HYDROCHLORIDE 20 MG/ML
5 INJECTION INTRAMUSCULAR; INTRAVENOUS EVERY 10 MIN PRN
Status: DISCONTINUED | OUTPATIENT
Start: 2019-11-07 | End: 2019-11-07 | Stop reason: HOSPADM

## 2019-11-07 RX ORDER — SODIUM CHLORIDE 9 MG/ML
INJECTION, SOLUTION INTRAVENOUS CONTINUOUS
Status: DISCONTINUED | OUTPATIENT
Start: 2019-11-07 | End: 2019-11-08

## 2019-11-07 RX ORDER — OXYCODONE HYDROCHLORIDE 5 MG/1
5 TABLET ORAL EVERY 4 HOURS PRN
Status: DISCONTINUED | OUTPATIENT
Start: 2019-11-07 | End: 2019-11-08

## 2019-11-07 RX ORDER — LABETALOL 20 MG/4 ML (5 MG/ML) INTRAVENOUS SYRINGE
5 EVERY 10 MIN PRN
Status: DISCONTINUED | OUTPATIENT
Start: 2019-11-07 | End: 2019-11-07 | Stop reason: HOSPADM

## 2019-11-07 RX ORDER — SODIUM CHLORIDE 0.9 % (FLUSH) 0.9 %
10 SYRINGE (ML) INJECTION PRN
Status: DISCONTINUED | OUTPATIENT
Start: 2019-11-07 | End: 2019-11-09 | Stop reason: HOSPADM

## 2019-11-07 RX ORDER — FENTANYL CITRATE 50 UG/ML
50 INJECTION, SOLUTION INTRAMUSCULAR; INTRAVENOUS EVERY 5 MIN PRN
Status: DISCONTINUED | OUTPATIENT
Start: 2019-11-07 | End: 2019-11-07 | Stop reason: HOSPADM

## 2019-11-07 RX ORDER — SODIUM CHLORIDE 0.9 % (FLUSH) 0.9 %
10 SYRINGE (ML) INJECTION PRN
Status: DISCONTINUED | OUTPATIENT
Start: 2019-11-07 | End: 2019-11-07 | Stop reason: HOSPADM

## 2019-11-07 RX ORDER — SUCCINYLCHOLINE/SOD CL,ISO/PF 200MG/10ML
SYRINGE (ML) INTRAVENOUS PRN
Status: DISCONTINUED | OUTPATIENT
Start: 2019-11-07 | End: 2019-11-07 | Stop reason: SDUPTHER

## 2019-11-07 RX ORDER — PROMETHAZINE HYDROCHLORIDE 25 MG/ML
6.25 INJECTION, SOLUTION INTRAMUSCULAR; INTRAVENOUS
Status: DISCONTINUED | OUTPATIENT
Start: 2019-11-07 | End: 2019-11-07 | Stop reason: HOSPADM

## 2019-11-07 RX ORDER — DIAZEPAM 5 MG/1
5 TABLET ORAL EVERY 6 HOURS PRN
Status: DISCONTINUED | OUTPATIENT
Start: 2019-11-07 | End: 2019-11-09 | Stop reason: HOSPADM

## 2019-11-07 RX ORDER — SODIUM CHLORIDE, SODIUM LACTATE, POTASSIUM CHLORIDE, CALCIUM CHLORIDE 600; 310; 30; 20 MG/100ML; MG/100ML; MG/100ML; MG/100ML
INJECTION, SOLUTION INTRAVENOUS CONTINUOUS
Status: DISCONTINUED | OUTPATIENT
Start: 2019-11-07 | End: 2019-11-07

## 2019-11-07 RX ORDER — SODIUM CHLORIDE 0.9 % (FLUSH) 0.9 %
10 SYRINGE (ML) INJECTION EVERY 12 HOURS SCHEDULED
Status: DISCONTINUED | OUTPATIENT
Start: 2019-11-07 | End: 2019-11-09 | Stop reason: HOSPADM

## 2019-11-07 RX ORDER — ESCITALOPRAM OXALATE 10 MG/1
10 TABLET ORAL DAILY
Status: DISCONTINUED | OUTPATIENT
Start: 2019-11-07 | End: 2019-11-09 | Stop reason: HOSPADM

## 2019-11-07 RX ORDER — LIDOCAINE HYDROCHLORIDE 20 MG/ML
INJECTION, SOLUTION INTRAVENOUS PRN
Status: DISCONTINUED | OUTPATIENT
Start: 2019-11-07 | End: 2019-11-07 | Stop reason: SDUPTHER

## 2019-11-07 RX ORDER — MIDAZOLAM HYDROCHLORIDE 1 MG/ML
INJECTION INTRAMUSCULAR; INTRAVENOUS PRN
Status: DISCONTINUED | OUTPATIENT
Start: 2019-11-07 | End: 2019-11-07 | Stop reason: SDUPTHER

## 2019-11-07 RX ORDER — FENTANYL CITRATE 50 UG/ML
INJECTION, SOLUTION INTRAMUSCULAR; INTRAVENOUS PRN
Status: DISCONTINUED | OUTPATIENT
Start: 2019-11-07 | End: 2019-11-07

## 2019-11-07 RX ADMIN — LIDOCAINE HYDROCHLORIDE 50 MG: 20 INJECTION, SOLUTION INTRAVENOUS at 12:12

## 2019-11-07 RX ADMIN — PROPOFOL 50 MG: 10 INJECTION, EMULSION INTRAVENOUS at 12:35

## 2019-11-07 RX ADMIN — METHOCARBAMOL 1000 MG: 100 INJECTION, SOLUTION INTRAMUSCULAR; INTRAVENOUS at 15:42

## 2019-11-07 RX ADMIN — HYDROMORPHONE HYDROCHLORIDE 0.5 MG: 1 INJECTION, SOLUTION INTRAMUSCULAR; INTRAVENOUS; SUBCUTANEOUS at 15:30

## 2019-11-07 RX ADMIN — SODIUM CHLORIDE, SODIUM LACTATE, POTASSIUM CHLORIDE, AND CALCIUM CHLORIDE: 600; 310; 30; 20 INJECTION, SOLUTION INTRAVENOUS at 12:04

## 2019-11-07 RX ADMIN — PHENYLEPHRINE HYDROCHLORIDE 40 MCG: 10 INJECTION, SOLUTION INTRAMUSCULAR; INTRAVENOUS; SUBCUTANEOUS at 13:54

## 2019-11-07 RX ADMIN — Medication 100 MG: at 12:12

## 2019-11-07 RX ADMIN — OXYCODONE HYDROCHLORIDE 5 MG: 5 TABLET ORAL at 17:49

## 2019-11-07 RX ADMIN — HYDROMORPHONE HYDROCHLORIDE 0.25 MG: 2 INJECTION, SOLUTION INTRAMUSCULAR; INTRAVENOUS; SUBCUTANEOUS at 14:44

## 2019-11-07 RX ADMIN — HYDROMORPHONE HYDROCHLORIDE 0.5 MG: 1 INJECTION, SOLUTION INTRAMUSCULAR; INTRAVENOUS; SUBCUTANEOUS at 16:58

## 2019-11-07 RX ADMIN — Medication 2 G: at 12:42

## 2019-11-07 RX ADMIN — MIDAZOLAM HYDROCHLORIDE 2 MG: 2 INJECTION, SOLUTION INTRAMUSCULAR; INTRAVENOUS at 12:02

## 2019-11-07 RX ADMIN — HYDROMORPHONE HYDROCHLORIDE 0.5 MG: 1 INJECTION, SOLUTION INTRAMUSCULAR; INTRAVENOUS; SUBCUTANEOUS at 20:23

## 2019-11-07 RX ADMIN — SODIUM CHLORIDE, SODIUM LACTATE, POTASSIUM CHLORIDE, AND CALCIUM CHLORIDE: 600; 310; 30; 20 INJECTION, SOLUTION INTRAVENOUS at 10:30

## 2019-11-07 RX ADMIN — PHENYLEPHRINE HYDROCHLORIDE 40 MCG: 10 INJECTION, SOLUTION INTRAMUSCULAR; INTRAVENOUS; SUBCUTANEOUS at 13:13

## 2019-11-07 RX ADMIN — FENTANYL CITRATE 50 MCG: 50 INJECTION, SOLUTION INTRAMUSCULAR; INTRAVENOUS at 15:55

## 2019-11-07 RX ADMIN — REMIFENTANIL HYDROCHLORIDE 0.1 MCG/KG/MIN: 1 INJECTION, POWDER, LYOPHILIZED, FOR SOLUTION INTRAVENOUS at 12:35

## 2019-11-07 RX ADMIN — Medication 10 ML: at 20:23

## 2019-11-07 RX ADMIN — ESCITALOPRAM OXALATE 10 MG: 10 TABLET ORAL at 17:49

## 2019-11-07 RX ADMIN — SODIUM CHLORIDE, POTASSIUM CHLORIDE, SODIUM LACTATE AND CALCIUM CHLORIDE: 600; 310; 30; 20 INJECTION, SOLUTION INTRAVENOUS at 10:30

## 2019-11-07 RX ADMIN — DIAZEPAM 5 MG: 5 TABLET ORAL at 20:23

## 2019-11-07 RX ADMIN — PROPOFOL 100 MG: 10 INJECTION, EMULSION INTRAVENOUS at 12:12

## 2019-11-07 RX ADMIN — PROPOFOL 150 MCG/KG/MIN: 10 INJECTION, EMULSION INTRAVENOUS at 12:35

## 2019-11-07 RX ADMIN — PHENYLEPHRINE HYDROCHLORIDE 80 MCG: 10 INJECTION, SOLUTION INTRAMUSCULAR; INTRAVENOUS; SUBCUTANEOUS at 12:47

## 2019-11-07 RX ADMIN — FENTANYL CITRATE 100 MCG: 50 INJECTION INTRAMUSCULAR; INTRAVENOUS at 12:09

## 2019-11-07 RX ADMIN — DEXAMETHASONE SODIUM PHOSPHATE 10 MG: 4 INJECTION, SOLUTION INTRAMUSCULAR; INTRAVENOUS at 14:29

## 2019-11-07 RX ADMIN — PROPOFOL 50 MG: 10 INJECTION, EMULSION INTRAVENOUS at 12:29

## 2019-11-07 RX ADMIN — FENTANYL CITRATE 50 MCG: 50 INJECTION, SOLUTION INTRAMUSCULAR; INTRAVENOUS at 15:48

## 2019-11-07 RX ADMIN — HYDROMORPHONE HYDROCHLORIDE 0.5 MG: 1 INJECTION, SOLUTION INTRAMUSCULAR; INTRAVENOUS; SUBCUTANEOUS at 16:32

## 2019-11-07 RX ADMIN — ONDANSETRON 4 MG: 2 INJECTION INTRAMUSCULAR; INTRAVENOUS at 14:30

## 2019-11-07 RX ADMIN — PROPOFOL 100 MG: 10 INJECTION, EMULSION INTRAVENOUS at 12:24

## 2019-11-07 RX ADMIN — METHOCARBAMOL 1000 MG: 100 INJECTION, SOLUTION INTRAMUSCULAR; INTRAVENOUS at 23:13

## 2019-11-07 RX ADMIN — PHENYLEPHRINE HYDROCHLORIDE 160 MCG: 10 INJECTION, SOLUTION INTRAMUSCULAR; INTRAVENOUS; SUBCUTANEOUS at 13:20

## 2019-11-07 RX ADMIN — PHENYLEPHRINE HYDROCHLORIDE 120 MCG: 10 INJECTION, SOLUTION INTRAMUSCULAR; INTRAVENOUS; SUBCUTANEOUS at 13:17

## 2019-11-07 RX ADMIN — PHENYLEPHRINE HYDROCHLORIDE 10 MCG/MIN: 10 INJECTION, SOLUTION INTRAMUSCULAR; INTRAVENOUS; SUBCUTANEOUS at 13:35

## 2019-11-07 RX ADMIN — HYDROMORPHONE HYDROCHLORIDE 0.5 MG: 1 INJECTION, SOLUTION INTRAMUSCULAR; INTRAVENOUS; SUBCUTANEOUS at 15:24

## 2019-11-07 RX ADMIN — DOCUSATE SODIUM 100 MG: 100 CAPSULE, LIQUID FILLED ORAL at 20:23

## 2019-11-07 RX ADMIN — SODIUM CHLORIDE: 9 INJECTION, SOLUTION INTRAVENOUS at 15:42

## 2019-11-07 ASSESSMENT — PULMONARY FUNCTION TESTS
PIF_VALUE: 4
PIF_VALUE: 3
PIF_VALUE: 16
PIF_VALUE: 3
PIF_VALUE: 15
PIF_VALUE: 16
PIF_VALUE: 16
PIF_VALUE: 15
PIF_VALUE: 3
PIF_VALUE: 1
PIF_VALUE: 15
PIF_VALUE: 16
PIF_VALUE: 15
PIF_VALUE: 16
PIF_VALUE: 16
PIF_VALUE: 1
PIF_VALUE: 15
PIF_VALUE: 16
PIF_VALUE: 16
PIF_VALUE: 3
PIF_VALUE: 16
PIF_VALUE: 4
PIF_VALUE: 4
PIF_VALUE: 16
PIF_VALUE: 3
PIF_VALUE: 16
PIF_VALUE: 16
PIF_VALUE: 15
PIF_VALUE: 2
PIF_VALUE: 15
PIF_VALUE: 19
PIF_VALUE: 16
PIF_VALUE: 16
PIF_VALUE: 14
PIF_VALUE: 14
PIF_VALUE: 16
PIF_VALUE: 1
PIF_VALUE: 16
PIF_VALUE: 15
PIF_VALUE: 4
PIF_VALUE: 15
PIF_VALUE: 16
PIF_VALUE: 15
PIF_VALUE: 16
PIF_VALUE: 16
PIF_VALUE: 15
PIF_VALUE: 16
PIF_VALUE: 17
PIF_VALUE: 15
PIF_VALUE: 15
PIF_VALUE: 0
PIF_VALUE: 16
PIF_VALUE: 16
PIF_VALUE: 15
PIF_VALUE: 15
PIF_VALUE: 2
PIF_VALUE: 3
PIF_VALUE: 15
PIF_VALUE: 14
PIF_VALUE: 16
PIF_VALUE: 3
PIF_VALUE: 15
PIF_VALUE: 15
PIF_VALUE: 16
PIF_VALUE: 15
PIF_VALUE: 16
PIF_VALUE: 4
PIF_VALUE: 16
PIF_VALUE: 16
PIF_VALUE: 13
PIF_VALUE: 16
PIF_VALUE: 15
PIF_VALUE: 15
PIF_VALUE: 5
PIF_VALUE: 16
PIF_VALUE: 15
PIF_VALUE: 16
PIF_VALUE: 15
PIF_VALUE: 16
PIF_VALUE: 16
PIF_VALUE: 14
PIF_VALUE: 15
PIF_VALUE: 1
PIF_VALUE: 16
PIF_VALUE: 3
PIF_VALUE: 15
PIF_VALUE: 16
PIF_VALUE: 4
PIF_VALUE: 16
PIF_VALUE: 16
PIF_VALUE: 15
PIF_VALUE: 15
PIF_VALUE: 16
PIF_VALUE: 15
PIF_VALUE: 26
PIF_VALUE: 16
PIF_VALUE: 16
PIF_VALUE: 0
PIF_VALUE: 16
PIF_VALUE: 16
PIF_VALUE: 6
PIF_VALUE: 16
PIF_VALUE: 16
PIF_VALUE: 15
PIF_VALUE: 16
PIF_VALUE: 16
PIF_VALUE: 14
PIF_VALUE: 16
PIF_VALUE: 24
PIF_VALUE: 15
PIF_VALUE: 16
PIF_VALUE: 16
PIF_VALUE: 15
PIF_VALUE: 16
PIF_VALUE: 15
PIF_VALUE: 16
PIF_VALUE: 15
PIF_VALUE: 16
PIF_VALUE: 15
PIF_VALUE: 4
PIF_VALUE: 15
PIF_VALUE: 14
PIF_VALUE: 15
PIF_VALUE: 14
PIF_VALUE: 16
PIF_VALUE: 5
PIF_VALUE: 4
PIF_VALUE: 1
PIF_VALUE: 3
PIF_VALUE: 16
PIF_VALUE: 4
PIF_VALUE: 15
PIF_VALUE: 3
PIF_VALUE: 1
PIF_VALUE: 12
PIF_VALUE: 15
PIF_VALUE: 16
PIF_VALUE: 14
PIF_VALUE: 2
PIF_VALUE: 4
PIF_VALUE: 16
PIF_VALUE: 14
PIF_VALUE: 15
PIF_VALUE: 16
PIF_VALUE: 20

## 2019-11-07 ASSESSMENT — PAIN SCALES - GENERAL
PAINLEVEL_OUTOF10: 7
PAINLEVEL_OUTOF10: 5
PAINLEVEL_OUTOF10: 7
PAINLEVEL_OUTOF10: 6
PAINLEVEL_OUTOF10: 8
PAINLEVEL_OUTOF10: 7
PAINLEVEL_OUTOF10: 7
PAINLEVEL_OUTOF10: 2
PAINLEVEL_OUTOF10: 7
PAINLEVEL_OUTOF10: 0
PAINLEVEL_OUTOF10: 7
PAINLEVEL_OUTOF10: 7

## 2019-11-07 ASSESSMENT — PAIN DESCRIPTION - PAIN TYPE
TYPE: SURGICAL PAIN

## 2019-11-07 ASSESSMENT — PAIN DESCRIPTION - LOCATION
LOCATION: NECK

## 2019-11-07 ASSESSMENT — LIFESTYLE VARIABLES: SMOKING_STATUS: 0

## 2019-11-07 ASSESSMENT — PAIN DESCRIPTION - FREQUENCY
FREQUENCY: CONTINUOUS
FREQUENCY: CONTINUOUS
FREQUENCY: INTERMITTENT
FREQUENCY: INTERMITTENT

## 2019-11-07 ASSESSMENT — PAIN DESCRIPTION - PROGRESSION
CLINICAL_PROGRESSION: GRADUALLY WORSENING
CLINICAL_PROGRESSION: GRADUALLY WORSENING
CLINICAL_PROGRESSION: GRADUALLY IMPROVING
CLINICAL_PROGRESSION: GRADUALLY WORSENING

## 2019-11-07 ASSESSMENT — PAIN DESCRIPTION - ONSET
ONSET: ON-GOING
ONSET: GRADUAL

## 2019-11-07 ASSESSMENT — PAIN - FUNCTIONAL ASSESSMENT
PAIN_FUNCTIONAL_ASSESSMENT: 0-10
PAIN_FUNCTIONAL_ASSESSMENT: ACTIVITIES ARE NOT PREVENTED

## 2019-11-07 ASSESSMENT — PAIN DESCRIPTION - ORIENTATION
ORIENTATION: POSTERIOR
ORIENTATION: POSTERIOR
ORIENTATION: POSTERIOR;UPPER
ORIENTATION: POSTERIOR

## 2019-11-07 ASSESSMENT — PAIN DESCRIPTION - DESCRIPTORS
DESCRIPTORS: ACHING;DISCOMFORT
DESCRIPTORS: DISCOMFORT;PENETRATING
DESCRIPTORS: ACHING;DISCOMFORT
DESCRIPTORS: ACHING;BURNING;DISCOMFORT
DESCRIPTORS: ACHING;DISCOMFORT

## 2019-11-08 PROBLEM — M81.0 AGE-RELATED OSTEOPOROSIS WITHOUT CURRENT PATHOLOGICAL FRACTURE: Status: ACTIVE | Noted: 2019-11-08

## 2019-11-08 LAB
HCT VFR BLD CALC: 30.2 % (ref 36–48)
HEMOGLOBIN: 9.8 G/DL (ref 12–16)
MCH RBC QN AUTO: 27.8 PG (ref 26–34)
MCHC RBC AUTO-ENTMCNC: 32.5 G/DL (ref 31–36)
MCV RBC AUTO: 85.4 FL (ref 80–100)
PDW BLD-RTO: 16.6 % (ref 12.4–15.4)
PLATELET # BLD: 338 K/UL (ref 135–450)
PMV BLD AUTO: 6.9 FL (ref 5–10.5)
RBC # BLD: 3.53 M/UL (ref 4–5.2)
WBC # BLD: 5.3 K/UL (ref 4–11)

## 2019-11-08 PROCEDURE — 6360000002 HC RX W HCPCS: Performed by: NURSE PRACTITIONER

## 2019-11-08 PROCEDURE — 6370000000 HC RX 637 (ALT 250 FOR IP): Performed by: PHYSICIAN ASSISTANT

## 2019-11-08 PROCEDURE — G0008 ADMIN INFLUENZA VIRUS VAC: HCPCS | Performed by: NEUROLOGICAL SURGERY

## 2019-11-08 PROCEDURE — 97161 PT EVAL LOW COMPLEX 20 MIN: CPT

## 2019-11-08 PROCEDURE — 90686 IIV4 VACC NO PRSV 0.5 ML IM: CPT | Performed by: NEUROLOGICAL SURGERY

## 2019-11-08 PROCEDURE — 97166 OT EVAL MOD COMPLEX 45 MIN: CPT

## 2019-11-08 PROCEDURE — 36415 COLL VENOUS BLD VENIPUNCTURE: CPT

## 2019-11-08 PROCEDURE — 6370000000 HC RX 637 (ALT 250 FOR IP): Performed by: NURSE PRACTITIONER

## 2019-11-08 PROCEDURE — 99231 SBSQ HOSP IP/OBS SF/LOW 25: CPT | Performed by: FAMILY MEDICINE

## 2019-11-08 PROCEDURE — 85027 COMPLETE CBC AUTOMATED: CPT

## 2019-11-08 PROCEDURE — 1200000000 HC SEMI PRIVATE

## 2019-11-08 PROCEDURE — 97116 GAIT TRAINING THERAPY: CPT

## 2019-11-08 PROCEDURE — 2580000003 HC RX 258: Performed by: NURSE PRACTITIONER

## 2019-11-08 PROCEDURE — 6360000002 HC RX W HCPCS: Performed by: NEUROLOGICAL SURGERY

## 2019-11-08 PROCEDURE — 97535 SELF CARE MNGMENT TRAINING: CPT

## 2019-11-08 PROCEDURE — 2580000003 HC RX 258: Performed by: PHYSICIAN ASSISTANT

## 2019-11-08 PROCEDURE — 6360000002 HC RX W HCPCS: Performed by: PHYSICIAN ASSISTANT

## 2019-11-08 RX ORDER — LIDOCAINE 4 G/G
1 PATCH TOPICAL DAILY
Qty: 7 PATCH | Refills: 0 | Status: SHIPPED | OUTPATIENT
Start: 2019-11-09 | End: 2022-09-20

## 2019-11-08 RX ORDER — LIDOCAINE 4 G/G
1 PATCH TOPICAL DAILY
Status: DISCONTINUED | OUTPATIENT
Start: 2019-11-08 | End: 2019-11-09 | Stop reason: HOSPADM

## 2019-11-08 RX ORDER — METHOCARBAMOL 750 MG/1
750 TABLET, FILM COATED ORAL 4 TIMES DAILY
Qty: 40 TABLET | Refills: 0 | Status: SHIPPED | OUTPATIENT
Start: 2019-11-09 | End: 2019-11-19

## 2019-11-08 RX ORDER — SENNA AND DOCUSATE SODIUM 50; 8.6 MG/1; MG/1
2 TABLET, FILM COATED ORAL DAILY
Qty: 30 TABLET | Refills: 0 | Status: SHIPPED | OUTPATIENT
Start: 2019-11-09 | End: 2022-09-20

## 2019-11-08 RX ORDER — OXYCODONE HYDROCHLORIDE 5 MG/1
5 TABLET ORAL EVERY 4 HOURS PRN
Status: DISCONTINUED | OUTPATIENT
Start: 2019-11-08 | End: 2019-11-09 | Stop reason: HOSPADM

## 2019-11-08 RX ORDER — SENNA AND DOCUSATE SODIUM 50; 8.6 MG/1; MG/1
2 TABLET, FILM COATED ORAL DAILY
Status: DISCONTINUED | OUTPATIENT
Start: 2019-11-08 | End: 2019-11-09 | Stop reason: HOSPADM

## 2019-11-08 RX ORDER — METHOCARBAMOL 750 MG/1
750 TABLET, FILM COATED ORAL 4 TIMES DAILY
Status: DISCONTINUED | OUTPATIENT
Start: 2019-11-09 | End: 2019-11-09 | Stop reason: HOSPADM

## 2019-11-08 RX ORDER — ACETAMINOPHEN 500 MG
1000 TABLET ORAL EVERY 6 HOURS
Status: DISCONTINUED | OUTPATIENT
Start: 2019-11-08 | End: 2019-11-09 | Stop reason: HOSPADM

## 2019-11-08 RX ORDER — DIAZEPAM 5 MG/1
5 TABLET ORAL EVERY 6 HOURS PRN
Qty: 40 TABLET | Refills: 0 | Status: SHIPPED | OUTPATIENT
Start: 2019-11-08 | End: 2019-11-18

## 2019-11-08 RX ORDER — OXYCODONE HYDROCHLORIDE 5 MG/1
10 TABLET ORAL EVERY 4 HOURS PRN
Status: DISCONTINUED | OUTPATIENT
Start: 2019-11-08 | End: 2019-11-09 | Stop reason: HOSPADM

## 2019-11-08 RX ORDER — OXYCODONE HYDROCHLORIDE 5 MG/1
5 TABLET ORAL EVERY 6 HOURS PRN
Qty: 28 TABLET | Refills: 0 | Status: SHIPPED | OUTPATIENT
Start: 2019-11-08 | End: 2019-11-15

## 2019-11-08 RX ADMIN — Medication 10 ML: at 09:54

## 2019-11-08 RX ADMIN — METHOCARBAMOL 1000 MG: 100 INJECTION, SOLUTION INTRAMUSCULAR; INTRAVENOUS at 09:53

## 2019-11-08 RX ADMIN — OXYCODONE HYDROCHLORIDE 5 MG: 5 TABLET ORAL at 05:37

## 2019-11-08 RX ADMIN — METHOCARBAMOL 1000 MG: 100 INJECTION, SOLUTION INTRAMUSCULAR; INTRAVENOUS at 18:15

## 2019-11-08 RX ADMIN — ACETAMINOPHEN 1000 MG: 500 TABLET ORAL at 09:57

## 2019-11-08 RX ADMIN — Medication 10 ML: at 19:58

## 2019-11-08 RX ADMIN — ESCITALOPRAM OXALATE 10 MG: 10 TABLET ORAL at 09:53

## 2019-11-08 RX ADMIN — METHOCARBAMOL 1000 MG: 100 INJECTION, SOLUTION INTRAMUSCULAR; INTRAVENOUS at 22:57

## 2019-11-08 RX ADMIN — ENOXAPARIN SODIUM 40 MG: 40 INJECTION SUBCUTANEOUS at 09:57

## 2019-11-08 RX ADMIN — ACETAMINOPHEN 1000 MG: 500 TABLET ORAL at 19:57

## 2019-11-08 RX ADMIN — INFLUENZA A VIRUS A/BRISBANE/02/2018 IVR-190 (H1N1) ANTIGEN (PROPIOLACTONE INACTIVATED), INFLUENZA A VIRUS A/KANSAS/14/2017 X-327 (H3N2) ANTIGEN (PROPIOLACTONE INACTIVATED), INFLUENZA B VIRUS B/MARYLAND/15/2016 ANTIGEN (PROPIOLACTONE INACTIVATED), INFLUENZA B VIRUS B/PHUKET/3073/2013 BVR-1B ANTIGEN (PROPIOLACTONE INACTIVATED) 0.5 ML: 15; 15; 15; 15 INJECTION, SUSPENSION INTRAMUSCULAR at 09:55

## 2019-11-08 RX ADMIN — ACETAMINOPHEN 1000 MG: 500 TABLET ORAL at 15:11

## 2019-11-08 RX ADMIN — HYDROMORPHONE HYDROCHLORIDE 0.5 MG: 1 INJECTION, SOLUTION INTRAMUSCULAR; INTRAVENOUS; SUBCUTANEOUS at 07:51

## 2019-11-08 RX ADMIN — SENNOSIDES AND DOCUSATE SODIUM 2 TABLET: 8.6; 5 TABLET ORAL at 09:53

## 2019-11-08 RX ADMIN — DIAZEPAM 5 MG: 5 TABLET ORAL at 05:37

## 2019-11-08 ASSESSMENT — PAIN SCALES - GENERAL
PAINLEVEL_OUTOF10: 5
PAINLEVEL_OUTOF10: 7
PAINLEVEL_OUTOF10: 0
PAINLEVEL_OUTOF10: 3
PAINLEVEL_OUTOF10: 7
PAINLEVEL_OUTOF10: 0
PAINLEVEL_OUTOF10: 2
PAINLEVEL_OUTOF10: 0
PAINLEVEL_OUTOF10: 5
PAINLEVEL_OUTOF10: 3
PAINLEVEL_OUTOF10: 0

## 2019-11-08 ASSESSMENT — PAIN DESCRIPTION - PROGRESSION
CLINICAL_PROGRESSION: GRADUALLY WORSENING
CLINICAL_PROGRESSION: NOT CHANGED

## 2019-11-08 ASSESSMENT — PAIN DESCRIPTION - ORIENTATION
ORIENTATION: POSTERIOR

## 2019-11-08 ASSESSMENT — PAIN DESCRIPTION - LOCATION
LOCATION: NECK

## 2019-11-08 ASSESSMENT — PAIN DESCRIPTION - DESCRIPTORS
DESCRIPTORS: ACHING
DESCRIPTORS: ACHING;SPASM;OTHER (COMMENT)
DESCRIPTORS: ACHING

## 2019-11-08 ASSESSMENT — PAIN DESCRIPTION - PAIN TYPE
TYPE: SURGICAL PAIN

## 2019-11-08 ASSESSMENT — PAIN DESCRIPTION - FREQUENCY
FREQUENCY: CONTINUOUS

## 2019-11-08 ASSESSMENT — PAIN DESCRIPTION - ONSET
ONSET: ON-GOING
ONSET: ON-GOING
ONSET: AWAKENED FROM SLEEP
ONSET: ON-GOING

## 2019-11-08 ASSESSMENT — PAIN - FUNCTIONAL ASSESSMENT
PAIN_FUNCTIONAL_ASSESSMENT: ACTIVITIES ARE NOT PREVENTED
PAIN_FUNCTIONAL_ASSESSMENT: PREVENTS OR INTERFERES SOME ACTIVE ACTIVITIES AND ADLS
PAIN_FUNCTIONAL_ASSESSMENT: PREVENTS OR INTERFERES SOME ACTIVE ACTIVITIES AND ADLS
PAIN_FUNCTIONAL_ASSESSMENT: ACTIVITIES ARE NOT PREVENTED
PAIN_FUNCTIONAL_ASSESSMENT: PREVENTS OR INTERFERES SOME ACTIVE ACTIVITIES AND ADLS
PAIN_FUNCTIONAL_ASSESSMENT: ACTIVITIES ARE NOT PREVENTED

## 2019-11-09 VITALS
RESPIRATION RATE: 16 BRPM | BODY MASS INDEX: 29.82 KG/M2 | SYSTOLIC BLOOD PRESSURE: 119 MMHG | WEIGHT: 190 LBS | HEIGHT: 67 IN | TEMPERATURE: 98.2 F | HEART RATE: 88 BPM | OXYGEN SATURATION: 96 % | DIASTOLIC BLOOD PRESSURE: 55 MMHG

## 2019-11-09 PROCEDURE — 2580000003 HC RX 258: Performed by: PHYSICIAN ASSISTANT

## 2019-11-09 PROCEDURE — 6370000000 HC RX 637 (ALT 250 FOR IP): Performed by: NURSE PRACTITIONER

## 2019-11-09 PROCEDURE — 6360000002 HC RX W HCPCS: Performed by: PHYSICIAN ASSISTANT

## 2019-11-09 PROCEDURE — 6370000000 HC RX 637 (ALT 250 FOR IP): Performed by: PHYSICIAN ASSISTANT

## 2019-11-09 RX ADMIN — ESCITALOPRAM OXALATE 10 MG: 10 TABLET ORAL at 09:50

## 2019-11-09 RX ADMIN — SENNOSIDES AND DOCUSATE SODIUM 2 TABLET: 8.6; 5 TABLET ORAL at 09:50

## 2019-11-09 RX ADMIN — ENOXAPARIN SODIUM 40 MG: 40 INJECTION SUBCUTANEOUS at 09:51

## 2019-11-09 RX ADMIN — Medication 10 ML: at 09:51

## 2019-11-09 RX ADMIN — METHOCARBAMOL TABLETS 750 MG: 750 TABLET, COATED ORAL at 09:50

## 2019-11-09 RX ADMIN — OXYCODONE HYDROCHLORIDE 5 MG: 5 TABLET ORAL at 00:55

## 2019-11-09 RX ADMIN — OXYCODONE HYDROCHLORIDE 5 MG: 5 TABLET ORAL at 09:51

## 2019-11-09 RX ADMIN — ACETAMINOPHEN 1000 MG: 500 TABLET ORAL at 09:49

## 2019-11-09 ASSESSMENT — PAIN DESCRIPTION - ONSET
ONSET: ON-GOING
ONSET: ON-GOING

## 2019-11-09 ASSESSMENT — PAIN DESCRIPTION - PROGRESSION
CLINICAL_PROGRESSION: NOT CHANGED
CLINICAL_PROGRESSION: GRADUALLY WORSENING

## 2019-11-09 ASSESSMENT — PAIN SCALES - GENERAL
PAINLEVEL_OUTOF10: 6
PAINLEVEL_OUTOF10: 0
PAINLEVEL_OUTOF10: 2
PAINLEVEL_OUTOF10: 5

## 2019-11-09 ASSESSMENT — PAIN DESCRIPTION - PAIN TYPE
TYPE: SURGICAL PAIN
TYPE: SURGICAL PAIN

## 2019-11-09 ASSESSMENT — PAIN DESCRIPTION - ORIENTATION
ORIENTATION: POSTERIOR
ORIENTATION: POSTERIOR

## 2019-11-09 ASSESSMENT — PAIN DESCRIPTION - FREQUENCY
FREQUENCY: CONTINUOUS
FREQUENCY: CONTINUOUS

## 2019-11-09 ASSESSMENT — PAIN DESCRIPTION - DESCRIPTORS
DESCRIPTORS: ACHING;DISCOMFORT
DESCRIPTORS: ACHING

## 2019-11-09 ASSESSMENT — PAIN - FUNCTIONAL ASSESSMENT
PAIN_FUNCTIONAL_ASSESSMENT: PREVENTS OR INTERFERES SOME ACTIVE ACTIVITIES AND ADLS
PAIN_FUNCTIONAL_ASSESSMENT: ACTIVITIES ARE NOT PREVENTED

## 2019-11-09 ASSESSMENT — PAIN DESCRIPTION - LOCATION
LOCATION: NECK
LOCATION: NECK

## 2019-11-11 ENCOUNTER — TELEPHONE (OUTPATIENT)
Dept: PHARMACY | Facility: CLINIC | Age: 74
End: 2019-11-11

## 2019-11-11 DIAGNOSIS — S12.100A ODONTOID FRACTURE, CLOSED, INITIAL ENCOUNTER (HCC): Primary | ICD-10-CM

## 2019-11-11 PROCEDURE — 1111F DSCHRG MED/CURRENT MED MERGE: CPT | Performed by: PHARMACIST

## 2019-11-27 PROBLEM — Z01.818 PRE-OP EXAM: Status: RESOLVED | Noted: 2019-10-28 | Resolved: 2019-11-27

## 2020-01-10 ENCOUNTER — TELEPHONE (OUTPATIENT)
Dept: PHARMACY | Facility: CLINIC | Age: 75
End: 2020-01-10

## 2020-01-22 ENCOUNTER — CARE COORDINATION (OUTPATIENT)
Dept: CARE COORDINATION | Age: 75
End: 2020-01-22

## 2020-01-22 NOTE — CARE COORDINATION
The patient will be excluded from Care Coordination for the following reason: Patient has no identified needs  Verbalized appreciation for ACM outreach. States she has participated in [facility] in past, well aware of the support offered, but denies having any identified needs requiring ACC support at this time. States willingness to review w/PCP in future, as needed.

## 2020-01-31 RX ORDER — ESCITALOPRAM OXALATE 10 MG/1
10 TABLET ORAL DAILY
Qty: 90 TABLET | Refills: 0 | Status: SHIPPED | OUTPATIENT
Start: 2020-01-31 | End: 2020-04-27

## 2020-02-21 ENCOUNTER — NURSE ONLY (OUTPATIENT)
Dept: FAMILY MEDICINE CLINIC | Age: 75
End: 2020-02-21
Payer: MEDICARE

## 2020-02-21 PROCEDURE — 96372 THER/PROPH/DIAG INJ SC/IM: CPT | Performed by: FAMILY MEDICINE

## 2020-02-21 RX ORDER — CYANOCOBALAMIN 1000 UG/ML
1000 INJECTION INTRAMUSCULAR; SUBCUTANEOUS ONCE
Status: COMPLETED | OUTPATIENT
Start: 2020-02-21 | End: 2020-02-21

## 2020-02-21 RX ADMIN — CYANOCOBALAMIN 1000 MCG: 1000 INJECTION INTRAMUSCULAR; SUBCUTANEOUS at 08:42

## 2020-03-02 ENCOUNTER — OFFICE VISIT (OUTPATIENT)
Dept: FAMILY MEDICINE CLINIC | Age: 75
End: 2020-03-02
Payer: MEDICARE

## 2020-03-02 VITALS
OXYGEN SATURATION: 98 % | BODY MASS INDEX: 30.6 KG/M2 | HEIGHT: 66 IN | SYSTOLIC BLOOD PRESSURE: 100 MMHG | WEIGHT: 190.4 LBS | DIASTOLIC BLOOD PRESSURE: 70 MMHG | HEART RATE: 76 BPM

## 2020-03-02 DIAGNOSIS — R53.83 OTHER FATIGUE: ICD-10-CM

## 2020-03-02 PROBLEM — H54.62 VISION LOSS, LEFT EYE: Status: ACTIVE | Noted: 2020-03-02

## 2020-03-02 LAB
A/G RATIO: 1.7 (ref 1.1–2.2)
ALBUMIN SERPL-MCNC: 4.5 G/DL (ref 3.4–5)
ALP BLD-CCNC: 85 U/L (ref 40–129)
ALT SERPL-CCNC: 10 U/L (ref 10–40)
ANION GAP SERPL CALCULATED.3IONS-SCNC: 15 MMOL/L (ref 3–16)
AST SERPL-CCNC: 19 U/L (ref 15–37)
BASOPHILS ABSOLUTE: 0 K/UL (ref 0–0.2)
BASOPHILS RELATIVE PERCENT: 1.1 %
BILIRUB SERPL-MCNC: <0.2 MG/DL (ref 0–1)
BUN BLDV-MCNC: 17 MG/DL (ref 7–20)
CALCIUM SERPL-MCNC: 9.1 MG/DL (ref 8.3–10.6)
CHLORIDE BLD-SCNC: 107 MMOL/L (ref 99–110)
CO2: 23 MMOL/L (ref 21–32)
CREAT SERPL-MCNC: 0.7 MG/DL (ref 0.6–1.2)
EOSINOPHILS ABSOLUTE: 0 K/UL (ref 0–0.6)
EOSINOPHILS RELATIVE PERCENT: 1.1 %
GFR AFRICAN AMERICAN: >60
GFR NON-AFRICAN AMERICAN: >60
GLOBULIN: 2.6 G/DL
GLUCOSE BLD-MCNC: 68 MG/DL (ref 70–99)
HCT VFR BLD CALC: 31.4 % (ref 36–48)
HEMOGLOBIN: 9.9 G/DL (ref 12–16)
LYMPHOCYTES ABSOLUTE: 1.2 K/UL (ref 1–5.1)
LYMPHOCYTES RELATIVE PERCENT: 34 %
MCH RBC QN AUTO: 24.4 PG (ref 26–34)
MCHC RBC AUTO-ENTMCNC: 31.4 G/DL (ref 31–36)
MCV RBC AUTO: 77.7 FL (ref 80–100)
MONOCYTES ABSOLUTE: 0.4 K/UL (ref 0–1.3)
MONOCYTES RELATIVE PERCENT: 11.3 %
NEUTROPHILS ABSOLUTE: 1.8 K/UL (ref 1.7–7.7)
NEUTROPHILS RELATIVE PERCENT: 52.5 %
PDW BLD-RTO: 17.7 % (ref 12.4–15.4)
PLATELET # BLD: 317 K/UL (ref 135–450)
PMV BLD AUTO: 7.2 FL (ref 5–10.5)
POTASSIUM SERPL-SCNC: 4.2 MMOL/L (ref 3.5–5.1)
RBC # BLD: 4.05 M/UL (ref 4–5.2)
SODIUM BLD-SCNC: 145 MMOL/L (ref 136–145)
TOTAL PROTEIN: 7.1 G/DL (ref 6.4–8.2)
TSH SERPL DL<=0.05 MIU/L-ACNC: 6.1 UIU/ML (ref 0.27–4.2)
WBC # BLD: 3.5 K/UL (ref 4–11)

## 2020-03-02 PROCEDURE — 99214 OFFICE O/P EST MOD 30 MIN: CPT | Performed by: FAMILY MEDICINE

## 2020-03-02 RX ORDER — OYSTER SHELL CALCIUM WITH VITAMIN D 500; 200 MG/1; [IU]/1
1 TABLET, FILM COATED ORAL DAILY
COMMUNITY
End: 2022-09-20

## 2020-03-02 SDOH — ECONOMIC STABILITY: TRANSPORTATION INSECURITY
IN THE PAST 12 MONTHS, HAS LACK OF TRANSPORTATION KEPT YOU FROM MEETINGS, WORK, OR FROM GETTING THINGS NEEDED FOR DAILY LIVING?: NO

## 2020-03-02 SDOH — ECONOMIC STABILITY: FOOD INSECURITY: WITHIN THE PAST 12 MONTHS, THE FOOD YOU BOUGHT JUST DIDN'T LAST AND YOU DIDN'T HAVE MONEY TO GET MORE.: NEVER TRUE

## 2020-03-02 SDOH — ECONOMIC STABILITY: INCOME INSECURITY: HOW HARD IS IT FOR YOU TO PAY FOR THE VERY BASICS LIKE FOOD, HOUSING, MEDICAL CARE, AND HEATING?: NOT HARD AT ALL

## 2020-03-02 SDOH — ECONOMIC STABILITY: FOOD INSECURITY: WITHIN THE PAST 12 MONTHS, YOU WORRIED THAT YOUR FOOD WOULD RUN OUT BEFORE YOU GOT MONEY TO BUY MORE.: NEVER TRUE

## 2020-03-02 SDOH — ECONOMIC STABILITY: TRANSPORTATION INSECURITY
IN THE PAST 12 MONTHS, HAS THE LACK OF TRANSPORTATION KEPT YOU FROM MEDICAL APPOINTMENTS OR FROM GETTING MEDICATIONS?: NO

## 2020-03-02 ASSESSMENT — ENCOUNTER SYMPTOMS
ABDOMINAL PAIN: 0
SORE THROAT: 0
SWOLLEN GLANDS: 0
COUGH: 0
CHANGE IN BOWEL HABIT: 0
NAUSEA: 0
VISUAL CHANGE: 0
VOMITING: 0

## 2020-03-02 NOTE — PROGRESS NOTES
Subjective:     Patient ID:Cindy Alston is a 66 y.o. female. fat    Fatigue   This is a new problem. The current episode started 1 to 4 weeks ago. The problem occurs constantly. The problem has been gradually worsening. Associated symptoms include fatigue. Pertinent negatives include no abdominal pain, anorexia, arthralgias, change in bowel habit, chest pain, chills, congestion, coughing, diaphoresis, fever, headaches, joint swelling, myalgias, nausea, neck pain, numbness, rash, sore throat, swollen glands, urinary symptoms, vertigo, visual change, vomiting or weakness. Nothing aggravates the symptoms. She has tried nothing for the symptoms. The treatment provided no relief. No Known Allergies    Current Outpatient Medications   Medication Sig Dispense Refill    calcium-vitamin D (OSCAL-500) 500-200 MG-UNIT per tablet Take 1 tablet by mouth daily      Ferrous Sulfate (IRON PO) Take 90 mg by mouth daily      escitalopram (LEXAPRO) 10 MG tablet TAKE 1 TABLET BY MOUTH DAILY 90 tablet 0    Cholecalciferol (VITAMIN D3 PO) Take 2,000 Units by mouth daily      lidocaine 4 % external patch Place 1 patch onto the skin daily 7 patch 0    sennosides-docusate sodium (SENOKOT-S) 8.6-50 MG tablet Take 2 tablets by mouth daily 30 tablet 0     No current facility-administered medications for this visit. Past Medical History:   Diagnosis Date    Age-related osteoporosis without current pathological fracture 11/8/2019    Anxiety     Colon polyp     Depression     Hypoglycemia     Vitamin B12 deficiency        Past Surgical History:   Procedure Laterality Date    APPENDECTOMY      CERVICAL FUSION N/A 11/7/2019    C1-2 FUSION POSTERIOR performed by Thony Andres MD at [facility] 5  9/2012    q 5    GASTRIC BYPASS SURGERY  2000    HYSTERECTOMY  1978    TONSILLECTOMY         Social History     Socioeconomic History    Marital status:       Spouse name: Not on file    Number of children: 3    Years of education: Not on file    Highest education level: Not on file   Occupational History    Occupation: [employer]    Social Needs    Financial resource strain: Not hard at all   [place] insecurity:     Worry: Never true     Inability: Never true   [employer] needs:     Medical: No     Non-medical: No   Tobacco Use    Smoking status: Former Smoker     Packs/day: 1.00     Years: 30.00     Pack years: 30.00     Start date: 10/10/1974     Last attempt to quit: 10/10/2004     Years since quitting: 15.4    Smokeless tobacco: Never Used    Tobacco comment: 15 years ago   Substance and Sexual Activity    Alcohol use:  Yes     Alcohol/week: 0.0 standard drinks     Comment: \"few shots every night\"    Drug use: No    Sexual activity: Not Currently     Partners: Male   Lifestyle    Physical activity:     Days per week: Not on file     Minutes per session: Not on file    Stress: Not on file   Relationships    Social connections:     Talks on phone: Not on file     Gets together: Not on file     Attends Yazidism service: Not on file     Active member of club or organization: Not on file     Attends meetings of clubs or organizations: Not on file     Relationship status: Not on file    Intimate partner violence:     Fear of current or ex partner: Not on file     Emotionally abused: Not on file     Physically abused: Not on file     Forced sexual activity: Not on file   Other Topics Concern    Not on file   Social History Narrative    Lives by self; work 35 h/e    Lots of friends    Goes to movies    No exercise       Family History   Problem Relation Age of Onset    Cancer Mother         lung    Cancer Father         lung    Cancer Brother         prostate       Immunization History   Administered Date(s) Administered    Influenza Virus Vaccine 09/28/2012, 10/15/2014    Influenza, Quadv, IM, PF (6 mo and older Fluzone, Flulaval, Fluarix, and time. Cranial Nerves: No cranial nerve deficit. Motor: No abnormal muscle tone. Coordination: Coordination normal.      Deep Tendon Reflexes: Reflexes normal.   Psychiatric:         Behavior: Behavior normal.         Thought Content:  Thought content normal.         Judgment: Judgment normal.         Assessment and Plan:     Depression  Will check labs and may ^ dose to 20    Fatigue  labs    Vision loss, left eye  Will get dopplers

## 2020-03-05 ENCOUNTER — HOSPITAL ENCOUNTER (OUTPATIENT)
Dept: VASCULAR LAB | Age: 75
Discharge: HOME | End: 2020-03-05
Payer: MEDICARE

## 2020-03-05 PROCEDURE — 93880 EXTRACRANIAL BILAT STUDY: CPT

## 2020-03-09 ENCOUNTER — TELEPHONE (OUTPATIENT)
Dept: FAMILY MEDICINE CLINIC | Age: 75
End: 2020-03-09

## 2020-04-20 ENCOUNTER — TELEPHONE (OUTPATIENT)
Dept: FAMILY MEDICINE CLINIC | Age: 75
End: 2020-04-20

## 2020-04-20 NOTE — TELEPHONE ENCOUNTER
RESULTS . .....       Pt requesting to be called back in ref to her results form(small camera swollled like a pill ) her capsocam     #[phone]

## 2020-04-20 NOTE — TELEPHONE ENCOUNTER
Patient called back to discuss results. We have not received results from [address] [place] [address] liver [facility] I called them at [phone] and they do not have results from 03/30/2020.   I asked that the results be sent over when they get them

## 2020-04-27 RX ORDER — ESCITALOPRAM OXALATE 10 MG/1
10 TABLET ORAL DAILY
Qty: 90 TABLET | Refills: 0 | Status: SHIPPED | OUTPATIENT
Start: 2020-04-27 | End: 2020-08-10

## 2020-05-07 ENCOUNTER — TELEPHONE (OUTPATIENT)
Dept: FAMILY MEDICINE CLINIC | Age: 75
End: 2020-05-07

## 2020-05-11 ENCOUNTER — NURSE ONLY (OUTPATIENT)
Dept: FAMILY MEDICINE CLINIC | Age: 75
End: 2020-05-11
Payer: MEDICARE

## 2020-05-11 PROCEDURE — 96372 THER/PROPH/DIAG INJ SC/IM: CPT | Performed by: FAMILY MEDICINE

## 2020-05-11 RX ORDER — CYANOCOBALAMIN 1000 UG/ML
1000 INJECTION INTRAMUSCULAR; SUBCUTANEOUS ONCE
Status: COMPLETED | OUTPATIENT
Start: 2020-05-11 | End: 2020-05-11

## 2020-05-11 RX ADMIN — CYANOCOBALAMIN 1000 MCG: 1000 INJECTION INTRAMUSCULAR; SUBCUTANEOUS at 09:36

## 2020-05-28 ENCOUNTER — TELEPHONE (OUTPATIENT)
Dept: FAMILY MEDICINE CLINIC | Age: 75
End: 2020-05-28

## 2020-05-28 RX ORDER — AMOXICILLIN 875 MG/1
875 TABLET, COATED ORAL 2 TIMES DAILY
Qty: 20 TABLET | Refills: 0 | Status: SHIPPED | OUTPATIENT
Start: 2020-05-28 | End: 2020-06-04

## 2020-05-28 NOTE — TELEPHONE ENCOUNTER
ECC received a call from: Patient    Name of Caller: Germania Navarro    Relationship to patient:self    Organization name: n/a    Best contact number: [phone]    Reason for call: Pt is calling because she has been feeling very tired, fatigue for 1 week or so. She wants to come in the office and not do a VV.  Please advise

## 2020-06-04 RX ORDER — AMOXICILLIN 875 MG/1
TABLET, COATED ORAL
Qty: 20 TABLET | Refills: 0 | Status: SHIPPED | OUTPATIENT
Start: 2020-06-04 | End: 2022-09-20

## 2020-06-08 ENCOUNTER — NURSE TRIAGE (OUTPATIENT)
Dept: OTHER | Facility: CLINIC | Age: 75
End: 2020-06-08

## 2020-06-09 ENCOUNTER — OFFICE VISIT (OUTPATIENT)
Dept: FAMILY MEDICINE CLINIC | Age: 75
End: 2020-06-09
Payer: MEDICARE

## 2020-06-09 ENCOUNTER — HOSPITAL ENCOUNTER (OUTPATIENT)
Age: 75
Discharge: HOME | End: 2020-06-09
Payer: MEDICARE

## 2020-06-09 ENCOUNTER — HOSPITAL ENCOUNTER (OUTPATIENT)
Dept: GENERAL RADIOLOGY | Age: 75
Discharge: HOME | End: 2020-06-09
Payer: MEDICARE

## 2020-06-09 VITALS
BODY MASS INDEX: 33.09 KG/M2 | HEART RATE: 83 BPM | OXYGEN SATURATION: 95 % | DIASTOLIC BLOOD PRESSURE: 82 MMHG | TEMPERATURE: 99 F | WEIGHT: 205 LBS | SYSTOLIC BLOOD PRESSURE: 130 MMHG

## 2020-06-09 PROBLEM — R06.09 DOE (DYSPNEA ON EXERTION): Status: ACTIVE | Noted: 2020-06-09

## 2020-06-09 LAB
BASOPHILS ABSOLUTE: 0 K/UL (ref 0–0.2)
BASOPHILS RELATIVE PERCENT: 1.4 %
EOSINOPHILS ABSOLUTE: 0.1 K/UL (ref 0–0.6)
EOSINOPHILS RELATIVE PERCENT: 1.7 %
HCT VFR BLD CALC: 35.5 % (ref 36–48)
HEMOGLOBIN: 11.5 G/DL (ref 12–16)
LYMPHOCYTES ABSOLUTE: 0.8 K/UL (ref 1–5.1)
LYMPHOCYTES RELATIVE PERCENT: 27.6 %
MCH RBC QN AUTO: 27.2 PG (ref 26–34)
MCHC RBC AUTO-ENTMCNC: 32.5 G/DL (ref 31–36)
MCV RBC AUTO: 83.8 FL (ref 80–100)
MONOCYTES ABSOLUTE: 0.3 K/UL (ref 0–1.3)
MONOCYTES RELATIVE PERCENT: 9.8 %
NEUTROPHILS ABSOLUTE: 1.8 K/UL (ref 1.7–7.7)
NEUTROPHILS RELATIVE PERCENT: 59.5 %
PDW BLD-RTO: 21 % (ref 12.4–15.4)
PLATELET # BLD: 312 K/UL (ref 135–450)
PMV BLD AUTO: 7.4 FL (ref 5–10.5)
RBC # BLD: 4.23 M/UL (ref 4–5.2)
WBC # BLD: 3 K/UL (ref 4–11)

## 2020-06-09 PROCEDURE — 99213 OFFICE O/P EST LOW 20 MIN: CPT | Performed by: FAMILY MEDICINE

## 2020-06-09 PROCEDURE — 71046 X-RAY EXAM CHEST 2 VIEWS: CPT

## 2020-06-09 PROCEDURE — 93000 ELECTROCARDIOGRAM COMPLETE: CPT | Performed by: FAMILY MEDICINE

## 2020-06-09 ASSESSMENT — ENCOUNTER SYMPTOMS
SWOLLEN GLANDS: 0
HEMOPTYSIS: 0
SHORTNESS OF BREATH: 1
VOMITING: 0
SORE THROAT: 0
WHEEZING: 0
ABDOMINAL PAIN: 0
ORTHOPNEA: 0
SPUTUM PRODUCTION: 1
RHINORRHEA: 1

## 2020-06-09 NOTE — PROGRESS NOTES
Subjective:     Patient ID:Cindy gO is a 66 y.o. female. Shortness of Breath   This is a recurrent problem. The current episode started more than 1 month ago. The problem occurs intermittently. The problem has been waxing and waning. Associated symptoms include rhinorrhea and sputum production. Pertinent negatives include no abdominal pain, chest pain, claudication, coryza, ear pain, fever, headaches, hemoptysis, leg pain, leg swelling, neck pain, orthopnea, PND, rash, sore throat, swollen glands, syncope, vomiting or wheezing. Nothing aggravates the symptoms. The patient has no known risk factors for DVT/PE. She has tried nothing for the symptoms. The treatment provided no relief. There is no history of allergies, aspirin allergies, asthma, bronchiolitis, CAD, chronic lung disease, COPD, DVT, a heart failure, PE, pneumonia or a recent surgery. Allergies   Allergen Reactions    Seasonal        Current Outpatient Medications   Medication Sig Dispense Refill    amoxicillin (AMOXIL) 875 MG tablet TAKE 1 TABLET BY MOUTH TWICE DAILY FOR 10 DAYS 20 tablet 0    escitalopram (LEXAPRO) 10 MG tablet TAKE 1 TABLET BY MOUTH DAILY 90 tablet 0    calcium-vitamin D (OSCAL-500) 500-200 MG-UNIT per tablet Take 1 tablet by mouth daily      Ferrous Sulfate (IRON PO) Take 90 mg by mouth daily      Cholecalciferol (VITAMIN D3 PO) Take 2,000 Units by mouth daily      lidocaine 4 % external patch Place 1 patch onto the skin daily (Patient not taking: Reported on 6/9/2020) 7 patch 0    sennosides-docusate sodium (SENOKOT-S) 8.6-50 MG tablet Take 2 tablets by mouth daily (Patient not taking: Reported on 6/9/2020) 30 tablet 0     No current facility-administered medications for this visit.         Past Medical History:   Diagnosis Date    Age-related osteoporosis without current pathological fracture 11/8/2019    Anxiety     Colon polyp     Depression     Hypoglycemia     Vitamin B12 deficiency        Past Surgical History:   Procedure Laterality Date    APPENDECTOMY      CERVICAL FUSION N/A 11/7/2019    C1-2 FUSION POSTERIOR performed by Haresh Elise. Geo Silva MD at [place] [address]  9/2012    q 5    GASTRIC BYPASS SURGERY  2000    HYSTERECTOMY  1978    TONSILLECTOMY         Social History     Socioeconomic History    Marital status:      Spouse name: Not on file    Number of children: 3    Years of education: Not on file    Highest education level: Not on file   Occupational History    Occupation: [employer]    Social Needs    Financial resource strain: Not hard at all   [place] insecurity     Worry: Never true     Inability: Never true   [employer] needs     Medical: No     Non-medical: No   Tobacco Use    Smoking status: Former Smoker     Packs/day: 1.00     Years: 30.00     Pack years: 30.00     Start date: 10/10/1974     Last attempt to quit: 10/10/2004     Years since quitting: 15.6    Smokeless tobacco: Never Used    Tobacco comment: 15 years ago   Substance and Sexual Activity    Alcohol use:  Yes     Alcohol/week: 0.0 standard drinks     Comment: \"few shots every night\"    Drug use: No    Sexual activity: Not Currently     Partners: Male   Lifestyle    Physical activity     Days per week: Not on file     Minutes per session: Not on file    Stress: Not on file   Relationships    Social connections     Talks on phone: Not on file     Gets together: Not on file     Attends Synagogue service: Not on file     Active member of club or organization: Not on file     Attends meetings of clubs or organizations: Not on file     Relationship status: Not on file    Intimate partner violence     Fear of current or ex partner: Not on file     Emotionally abused: Not on file     Physically abused: Not on file     Forced sexual activity: Not on file   Other Topics Concern    Not on file   Social History Narrative    Lives by self; work 33 h/e

## 2020-06-10 LAB
T4 FREE: 1.1 NG/DL (ref 0.9–1.8)
TSH SERPL DL<=0.05 MIU/L-ACNC: 4.13 UIU/ML (ref 0.27–4.2)

## 2020-06-15 ENCOUNTER — HOSPITAL ENCOUNTER (OUTPATIENT)
Dept: NON INVASIVE DIAGNOSTICS | Age: 75
Discharge: HOME | End: 2020-06-15
Payer: MEDICARE

## 2020-06-15 ENCOUNTER — TELEPHONE (OUTPATIENT)
Dept: FAMILY MEDICINE CLINIC | Age: 75
End: 2020-06-15

## 2020-06-15 LAB
LV EF: 45 %
LVEF MODALITY: NORMAL

## 2020-06-15 PROCEDURE — 93306 TTE W/DOPPLER COMPLETE: CPT

## 2020-06-15 NOTE — TELEPHONE ENCOUNTER
ECC received a call from:    Name of Caller: Cindy    Relationship to patient:self    Organization name:n/a    Best contact number: [phone]    Reason for call: Pt missed a call for results. Pt states she is going out of town tomorrow and she says her vm is secure if she misses a call back this time in regards to results to leave them on her vm.  Please advise

## 2020-06-16 NOTE — TELEPHONE ENCOUNTER
Discussed echo--showed mild diastolic dysfunction--but basically OK--if due to allergy, allegra should help-

## 2020-08-10 RX ORDER — ESCITALOPRAM OXALATE 10 MG/1
10 TABLET ORAL DAILY
Qty: 90 TABLET | Refills: 0 | Status: SHIPPED | OUTPATIENT
Start: 2020-08-10 | End: 2020-11-02

## 2020-09-24 ENCOUNTER — OFFICE VISIT (OUTPATIENT)
Dept: FAMILY MEDICINE CLINIC | Age: 75
End: 2020-09-24
Payer: MEDICARE

## 2020-09-24 DIAGNOSIS — E53.8 B12 DEFICIENCY: Primary | ICD-10-CM

## 2020-09-24 PROCEDURE — 96372 THER/PROPH/DIAG INJ SC/IM: CPT | Performed by: NURSE PRACTITIONER

## 2020-09-24 PROCEDURE — 99211 OFF/OP EST MAY X REQ PHY/QHP: CPT | Performed by: FAMILY MEDICINE

## 2020-09-24 RX ORDER — CYANOCOBALAMIN 1000 UG/ML
1000 INJECTION INTRAMUSCULAR; SUBCUTANEOUS ONCE
Status: COMPLETED | OUTPATIENT
Start: 2020-09-24 | End: 2020-09-24

## 2020-09-24 RX ADMIN — CYANOCOBALAMIN 1000 MCG: 1000 INJECTION INTRAMUSCULAR; SUBCUTANEOUS at 11:49

## 2020-09-24 RX ADMIN — CYANOCOBALAMIN 1000 MCG: 1000 INJECTION INTRAMUSCULAR; SUBCUTANEOUS at 11:58

## 2020-10-26 ENCOUNTER — NURSE ONLY (OUTPATIENT)
Dept: FAMILY MEDICINE CLINIC | Age: 75
End: 2020-10-26
Payer: MEDICARE

## 2020-10-26 PROCEDURE — 90653 IIV ADJUVANT VACCINE IM: CPT | Performed by: FAMILY MEDICINE

## 2020-10-26 PROCEDURE — 96372 THER/PROPH/DIAG INJ SC/IM: CPT | Performed by: FAMILY MEDICINE

## 2020-10-26 PROCEDURE — G0008 ADMIN INFLUENZA VIRUS VAC: HCPCS | Performed by: FAMILY MEDICINE

## 2020-10-26 RX ORDER — CYANOCOBALAMIN 1000 UG/ML
1000 INJECTION INTRAMUSCULAR; SUBCUTANEOUS ONCE
Status: COMPLETED | OUTPATIENT
Start: 2020-10-26 | End: 2020-10-26

## 2020-10-26 RX ADMIN — CYANOCOBALAMIN 1000 MCG: 1000 INJECTION INTRAMUSCULAR; SUBCUTANEOUS at 12:04

## 2020-11-02 RX ORDER — ESCITALOPRAM OXALATE 10 MG/1
10 TABLET ORAL DAILY
Qty: 90 TABLET | Refills: 0 | Status: SHIPPED | OUTPATIENT
Start: 2020-11-02 | End: 2021-02-13

## 2021-02-02 ENCOUNTER — TELEPHONE (OUTPATIENT)
Dept: FAMILY MEDICINE CLINIC | Age: 76
End: 2021-02-02

## 2021-02-02 ENCOUNTER — NURSE TRIAGE (OUTPATIENT)
Dept: OTHER | Facility: CLINIC | Age: 76
End: 2021-02-02

## 2021-02-02 NOTE — TELEPHONE ENCOUNTER
----- Message from Rashard Bird sent at 2/2/2021 10:40 AM EST -----  Subject: Appointment Request    Reason for Call: Urgent (Patient Request) No Script    QUESTIONS  Type of Appointment? Established Patient  Reason for appointment request? Available appointments did not meet   patient need  Additional Information for Provider? Pt would like to come in today to see   anyone for heavy breathing. She says she was offered a 1:30 earlier today. Please advise.  ---------------------------------------------------------------------------  --------------  CALL BACK INFO  What is the best way for the office to contact you? OK to leave message on   voicemail  Preferred Call Back Phone Number? [phone]  ---------------------------------------------------------------------------  --------------  SCRIPT ANSWERS  Relationship to Patient? Self  Appointment reason? Symptomatic  Select script based on patient symptoms? Adult No Script   (Is the patient requesting to see the provider for a procedure?)? No  (Is the patient requesting to see the provider urgently  today or   tomorrow. )? Yes  Have you been diagnosed with   tested for   or told that you are suspected of having COVID-19 (Coronavirus)? No  Have you had a fever or taken medication to treat a fever within the past   3 days? No  Have you had a cough   shortness of breath or flu-like symptoms within the past 3 days? No  Do you currently have flu-like symptoms including fever or chills   cough   shortness of breath   or difficulty breathing   or new loss of taste or smell? No  (Service Expert  click yes below to proceed with [facility] As Usual   Scheduling)?  Yes

## 2021-02-02 NOTE — TELEPHONE ENCOUNTER
Patient called Dustin at [facility] pre-service center [facility])  with red flag complaint. Brief description of triage: States it is difficult for her to take a deep breath. Triage indicates for patient to see pcp today. Care advice provided, patient verbalizes understanding; denies any other questions or concerns; instructed to call back for any new or worsening symptoms. Writer provided warm transfer to Mary Davidson at [facility] for appointment scheduling. Attention Provider: Thank you for allowing me to participate in the care of your patient. The patient was connected to triage in response to information provided to the [facility]. Please do not respond through this encounter as the response is not directed to a shared pool. Reminders:     Call [address] Provider/Office    Care Advice - both instruction and documentation    Routing - PCP (and NP for 2nd level triage)    PLEASE DELETE ALL RED TEXT PRIOR TO SIGNING NOTE        Reason for Disposition   MILD difficulty breathing (e.g., minimal/no SOB at rest, SOB with walking, pulse < 100) of new onset or worse than normal    Answer Assessment - Initial Assessment Questions  1. RESPIRATORY STATUS: \"Describe your breathing? \" (e.g., wheezing, shortness of breath, unable to speak, severe coughing)       No wheezing, feels like it is difficult to take a full breath in.   2. ONSET: \"When did this breathing problem begin? \"     Has been going on for months  3. PATTERN \"Does the difficult breathing come and go, or has it been constant since it started? \"      Comes and goes, depending on what she is doing, when she walks distances she is breathing hard when she makes it where she is going. 4. SEVERITY: \"How bad is your breathing? \" (e.g., mild, moderate, severe)     - MILD: No SOB at rest, mild SOB with walking, speaks normally in sentences, can lay down, no retractions, pulse < 100.     - MODERATE: SOB at rest, SOB with minimal exertion and prefers to sit, cannot lie down flat, speaks in phrases, mild retractions, audible wheezing, pulse 100-120.     - SEVERE: Very SOB at rest, speaks in single words, struggling to breathe, sitting hunched forward, retractions, pulse > 120       Moderate  5. RECURRENT SYMPTOM: \"Have you had difficulty breathing before? \" If so, ask: \"When was the last time? \" and \"What happened that time? \"       No, this is new. 6. CARDIAC HISTORY: \"Do you have any history of heart disease? \" (e.g., heart attack, angina, bypass surgery, angioplasty)       no  7. LUNG HISTORY: \"Do you have any history of lung disease? \"  (e.g., pulmonary embolus, asthma, emphysema)      no  8. CAUSE: \"What do you think is causing the breathing problem? \"       Thinks it may be allergies  9. OTHER SYMPTOMS: \"Do you have any other symptoms? (e.g., dizziness, runny nose, cough, chest pain, fever)      Runny nose is normal for her. 10. PREGNANCY: \"Is there any chance you are pregnant? \" \"When was your last menstrual period? \"        no  11. TRAVEL: \"Have you traveled out of the country in the last month? \" (e.g., travel history, exposures)        no    Protocols used: BREATHING DIFFICULTY-ADULT-[place]

## 2021-02-13 RX ORDER — ESCITALOPRAM OXALATE 10 MG/1
10 TABLET ORAL DAILY
Qty: 90 TABLET | Refills: 0 | Status: SHIPPED | OUTPATIENT
Start: 2021-02-13 | End: 2021-05-14

## 2021-12-01 ENCOUNTER — NURSE ONLY (OUTPATIENT)
Dept: INTERNAL MEDICINE CLINIC | Age: 76
End: 2021-12-01

## 2021-12-01 ENCOUNTER — TELEPHONE (OUTPATIENT)
Dept: FAMILY MEDICINE CLINIC | Age: 76
End: 2021-12-01

## 2021-12-01 DIAGNOSIS — R05.9 COUGH: ICD-10-CM

## 2021-12-01 DIAGNOSIS — R68.83 CHILLS: ICD-10-CM

## 2021-12-01 DIAGNOSIS — R09.89 RUNNY NOSE: ICD-10-CM

## 2021-12-01 DIAGNOSIS — R05.9 COUGH: Primary | ICD-10-CM

## 2021-12-01 ASSESSMENT — PATIENT HEALTH QUESTIONNAIRE - PHQ9
SUM OF ALL RESPONSES TO PHQ QUESTIONS 1-9: 0
1. LITTLE INTEREST OR PLEASURE IN DOING THINGS: 0
SUM OF ALL RESPONSES TO PHQ QUESTIONS 1-9: 0
SUM OF ALL RESPONSES TO PHQ9 QUESTIONS 1 & 2: 0
2. FEELING DOWN, DEPRESSED OR HOPELESS: 0
SUM OF ALL RESPONSES TO PHQ QUESTIONS 1-9: 0

## 2021-12-01 NOTE — TELEPHONE ENCOUNTER
----- Message from CRAZE sent at 12/1/2021 10:30 AM EST -----  Subject: Appointment Request    Reason for Call: Urgent Cough Cold    QUESTIONS  Type of Appointment? Established Patient  Reason for appointment request? No appointments available during search  Additional Information for Provider? Patient is experiencing fatigue and   sinus issues with sweats. No immediate appts available. Please reach out   anytime between 10:00am and 2:00pm or after 4:00pm. Please reach out to   mobile first and then the house phone.  ---------------------------------------------------------------------------  --------------  [address]  What is the best way for the office to contact you? OK to leave message on   voicemail  Preferred Call Back Phone Number? [phone]  ---------------------------------------------------------------------------  --------------  SCRIPT ANSWERS  Relationship to Patient? Self  Are you currently unable to finish sentences due to any difficulty   breathing? No  Are you unable to swallow liquids? No  Are you having fevers (100.4 or greater), chills, or sweats? Yes   Have you been diagnosed with, awaiting test results for, or told that you   are suspected of having COVID-19 (Coronavirus)? (If patient has tested   negative or was tested as a requirement for work, school, or travel and   not based on symptoms, answer no)? No  Within the past two weeks have you developed any of the following symptoms   (answer no if symptoms have been present longer than 2 weeks or began   more than 2 weeks ago)? Fever or Chills, Cough, Shortness of breath or   difficulty breathing, Loss of taste or smell, Sore throat, Nasal   congestion, Sneezing or runny nose, Fatigue or generalized body aches   (answer no if pain is specific to a body part e.g. back pain), Diarrhea,   Headache?  Yes

## 2021-12-02 ENCOUNTER — TELEPHONE (OUTPATIENT)
Dept: FAMILY MEDICINE CLINIC | Age: 76
End: 2021-12-02

## 2021-12-02 LAB — SARS-COV-2: NOT DETECTED

## 2021-12-02 RX ORDER — AMOXICILLIN 875 MG/1
875 TABLET, COATED ORAL 2 TIMES DAILY
Qty: 20 TABLET | Refills: 0 | Status: SHIPPED | OUTPATIENT
Start: 2021-12-02 | End: 2021-12-12

## 2021-12-02 NOTE — TELEPHONE ENCOUNTER
----- Message from Nexidia Senters sent at 12/2/2021  2:45 PM EST -----  Subject: Results Request    QUESTIONS  Which lab or imaging result is the patient calling about? Covid Test  Which provider ordered the test? Brandt Tian   At what location was the test performed? Date the test was performed? 2021-12-01  Additional Information for Provider? Patient had covid test yesterday at   3:45, patient is wondering if test results are in yet?  ---------------------------------------------------------------------------  --------------  [address]  What is the best way for the office to contact you? OK to leave message on   voicemail  Preferred Call Back Phone Number?  [phone]

## 2021-12-22 ENCOUNTER — OFFICE VISIT (OUTPATIENT)
Dept: ENT CLINIC | Age: 76
End: 2021-12-22
Payer: MEDICARE

## 2021-12-22 VITALS — WEIGHT: 213 LBS | SYSTOLIC BLOOD PRESSURE: 141 MMHG | BODY MASS INDEX: 34.38 KG/M2 | DIASTOLIC BLOOD PRESSURE: 72 MMHG

## 2021-12-22 DIAGNOSIS — J30.9 ALLERGIC RHINITIS, UNSPECIFIED SEASONALITY, UNSPECIFIED TRIGGER: Primary | ICD-10-CM

## 2021-12-22 DIAGNOSIS — J06.9 UPPER RESPIRATORY TRACT INFECTION, UNSPECIFIED TYPE: ICD-10-CM

## 2021-12-22 PROCEDURE — 99203 OFFICE O/P NEW LOW 30 MIN: CPT | Performed by: OTOLARYNGOLOGY

## 2021-12-22 RX ORDER — FLUTICASONE PROPIONATE 50 MCG
1 SPRAY, SUSPENSION (ML) NASAL 2 TIMES DAILY
Qty: 32 G | Refills: 3 | Status: SHIPPED | OUTPATIENT
Start: 2021-12-22

## 2021-12-22 NOTE — PROGRESS NOTES
[place]      Patient Name: [facility] Record Number:  [id number]  Primary Care Physician:  Fern Laurent MD  Date of Consultation: 12/22/2021    Chief Complaint: Cresenciano Guanaco nose        HISTORY Joseph Young is a(n) 64 y.o. female who presents for evaluation of a runny nose. The patient says that she started having really runny nose a couple of weeks ago. It is fairly clear. She otherwise feels well. She was tested for Covid and it was negative. She is using Flonase. She said that she does not have nasal congestion. She says that her sense of smell is okay. She is never had surgery on her nose. Antibiotics did not make much of a difference. REVIEW OF SYSTEMS  As above    PHYSICAL EXAM  GENERAL: No Acute Distress, Alert and Oriented, no Hoarseness, strong voice  EYES: EOMI, Anti-icteric  HENT:   Head: Normocephalic and atraumatic. Face:  Symmetric, facial nerve intact, no sinus tenderness  Right Ear: Normal external ear, normal external auditory canal, intact tympanic membrane with normal mobility and aerated middle ear  Left Ear: Normal external ear, normal external auditory canal, intact tympanic membrane with normal mobility and aerated middle ear  Mouth/Oral Cavity:  normal lips, Uvula is midline, no mucosal lesions  Oropharynx/Larynx:  normal oropharynx,  Nose:Normal external nasal appearance. Rigid scope was used to visualize the nasal cavity. She has some clear rhinorrhea with no polyps or purulent drainage. NECK: Normal range of motion, no thyromegaly, trachea is midline, no lymphadenopathy, no neck masses, no crepitus            ASSESSMENT/PLAN  1. Allergic rhinitis, unspecified seasonality, unspecified trigger  I feel as though the patient's runny nose is likely either allergy or just symptoms of persistent bilateral upper respiratory infection.   She really does not have any symptoms consistent with sinusitis such as loss of smell or purulent drainage. I did a scope as well and is just clear rhinorrhea. I would like for do the Flonase and started her on nasal saline irrigations. I would like for her to follow-up in a few weeks and if she still having issues we could add in some different nasal medications for chronic rhinitis. I do not think it is warranted as it is only been 2 or 3 weeks. 2. Upper respiratory tract infection, unspecified type  As above             I have performed a head and neck physical exam personally or was physically present during the key or critical portions of the service. This note was generated completely or in part utilizing Dragon dictation speech recognition software. Occasionally, words are mistranscribed and despite editing, the text may contain inaccuracies due to incorrect word recognition. If further clarification is needed please contact the office at [phone].

## 2022-02-16 ENCOUNTER — HOSPITAL ENCOUNTER (INPATIENT)
Age: 77
LOS: 1 days | Discharge: HOME | DRG: 293 | End: 2022-02-17
Attending: EMERGENCY MEDICINE | Admitting: INTERNAL MEDICINE
Payer: MEDICARE

## 2022-02-16 ENCOUNTER — APPOINTMENT (OUTPATIENT)
Dept: GENERAL RADIOLOGY | Age: 77
DRG: 293 | End: 2022-02-16
Payer: MEDICARE

## 2022-02-16 DIAGNOSIS — R06.09 DYSPNEA ON EXERTION: ICD-10-CM

## 2022-02-16 DIAGNOSIS — R07.9 CHEST PAIN, UNSPECIFIED TYPE: Primary | ICD-10-CM

## 2022-02-16 LAB
ANION GAP SERPL CALCULATED.3IONS-SCNC: 7 MMOL/L (ref 3–16)
BASE EXCESS VENOUS: 4.8 MMOL/L (ref -2–3)
BASOPHILS ABSOLUTE: 0 K/UL (ref 0–0.2)
BASOPHILS RELATIVE PERCENT: 0.6 %
BUN BLDV-MCNC: 15 MG/DL (ref 7–20)
CALCIUM SERPL-MCNC: 9.2 MG/DL (ref 8.3–10.6)
CARBOXYHEMOGLOBIN: 0.8 % (ref 0–1.5)
CHLORIDE BLD-SCNC: 103 MMOL/L (ref 99–110)
CO2: 27 MMOL/L (ref 21–32)
CREAT SERPL-MCNC: 0.5 MG/DL (ref 0.6–1.2)
EOSINOPHILS ABSOLUTE: 0 K/UL (ref 0–0.6)
EOSINOPHILS RELATIVE PERCENT: 1.4 %
GFR AFRICAN AMERICAN: >60
GFR NON-AFRICAN AMERICAN: >60
GLUCOSE BLD-MCNC: 109 MG/DL (ref 70–99)
HCO3 VENOUS: 29.8 MMOL/L (ref 24–28)
HCT VFR BLD CALC: 36.5 % (ref 36–48)
HEMOGLOBIN, VEN, REDUCED: 7.4 %
HEMOGLOBIN: 12.5 G/DL (ref 12–16)
LV EF: 59 %
LVEF MODALITY: NORMAL
LYMPHOCYTES ABSOLUTE: 1.2 K/UL (ref 1–5.1)
LYMPHOCYTES RELATIVE PERCENT: 33.4 %
MCH RBC QN AUTO: 32.1 PG (ref 26–34)
MCHC RBC AUTO-ENTMCNC: 34.4 G/DL (ref 31–36)
MCV RBC AUTO: 93.4 FL (ref 80–100)
METHEMOGLOBIN VENOUS: 1.3 % (ref 0–1.5)
MONOCYTES ABSOLUTE: 0.4 K/UL (ref 0–1.3)
MONOCYTES RELATIVE PERCENT: 10.5 %
NEUTROPHILS ABSOLUTE: 1.9 K/UL (ref 1.7–7.7)
NEUTROPHILS RELATIVE PERCENT: 54.1 %
O2 SAT, VEN: 92 %
PCO2, VEN: 44.2 MMHG (ref 41–51)
PDW BLD-RTO: 13.4 % (ref 12.4–15.4)
PH VENOUS: 7.44 (ref 7.35–7.45)
PLATELET # BLD: 284 K/UL (ref 135–450)
PMV BLD AUTO: 6.5 FL (ref 5–10.5)
PO2, VEN: 65.9 MMHG (ref 25–40)
POTASSIUM SERPL-SCNC: 4.3 MMOL/L (ref 3.5–5.1)
PRO-BNP: 244 PG/ML (ref 0–449)
RBC # BLD: 3.9 M/UL (ref 4–5.2)
SODIUM BLD-SCNC: 137 MMOL/L (ref 136–145)
TCO2 CALC VENOUS: 31 MMOL/L
TROPONIN: <0.01 NG/ML
TROPONIN: <0.01 NG/ML
WBC # BLD: 3.5 K/UL (ref 4–11)

## 2022-02-16 PROCEDURE — 78452 HT MUSCLE IMAGE SPECT MULT: CPT

## 2022-02-16 PROCEDURE — 82803 BLOOD GASES ANY COMBINATION: CPT

## 2022-02-16 PROCEDURE — 93005 ELECTROCARDIOGRAM TRACING: CPT | Performed by: EMERGENCY MEDICINE

## 2022-02-16 PROCEDURE — 99283 EMERGENCY DEPT VISIT LOW MDM: CPT

## 2022-02-16 PROCEDURE — 71045 X-RAY EXAM CHEST 1 VIEW: CPT

## 2022-02-16 PROCEDURE — 80048 BASIC METABOLIC PNL TOTAL CA: CPT

## 2022-02-16 PROCEDURE — 83880 ASSAY OF NATRIURETIC PEPTIDE: CPT

## 2022-02-16 PROCEDURE — 3430000000 HC RX DIAGNOSTIC RADIOPHARMACEUTICAL: Performed by: INTERNAL MEDICINE

## 2022-02-16 PROCEDURE — 6370000000 HC RX 637 (ALT 250 FOR IP): Performed by: INTERNAL MEDICINE

## 2022-02-16 PROCEDURE — 2580000003 HC RX 258: Performed by: INTERNAL MEDICINE

## 2022-02-16 PROCEDURE — A9502 TC99M TETROFOSMIN: HCPCS | Performed by: INTERNAL MEDICINE

## 2022-02-16 PROCEDURE — 6360000002 HC RX W HCPCS: Performed by: INTERNAL MEDICINE

## 2022-02-16 PROCEDURE — 85025 COMPLETE CBC W/AUTO DIFF WBC: CPT

## 2022-02-16 PROCEDURE — 96374 THER/PROPH/DIAG INJ IV PUSH: CPT

## 2022-02-16 PROCEDURE — 6360000002 HC RX W HCPCS: Performed by: EMERGENCY MEDICINE

## 2022-02-16 PROCEDURE — 36415 COLL VENOUS BLD VENIPUNCTURE: CPT

## 2022-02-16 PROCEDURE — 1200000000 HC SEMI PRIVATE

## 2022-02-16 PROCEDURE — 93017 CV STRESS TEST TRACING ONLY: CPT

## 2022-02-16 PROCEDURE — 84484 ASSAY OF TROPONIN QUANT: CPT

## 2022-02-16 RX ORDER — SODIUM CHLORIDE 9 MG/ML
25 INJECTION, SOLUTION INTRAVENOUS PRN
Status: DISCONTINUED | OUTPATIENT
Start: 2022-02-16 | End: 2022-02-17 | Stop reason: HOSPADM

## 2022-02-16 RX ORDER — MECOBALAMIN 5000 MCG
5 TABLET,DISINTEGRATING ORAL NIGHTLY PRN
Status: DISCONTINUED | OUTPATIENT
Start: 2022-02-16 | End: 2022-02-17 | Stop reason: HOSPADM

## 2022-02-16 RX ORDER — ACETAMINOPHEN 650 MG/1
650 SUPPOSITORY RECTAL EVERY 6 HOURS PRN
Status: DISCONTINUED | OUTPATIENT
Start: 2022-02-16 | End: 2022-02-17 | Stop reason: HOSPADM

## 2022-02-16 RX ORDER — SODIUM CHLORIDE 0.9 % (FLUSH) 0.9 %
5-40 SYRINGE (ML) INJECTION PRN
Status: DISCONTINUED | OUTPATIENT
Start: 2022-02-16 | End: 2022-02-17 | Stop reason: HOSPADM

## 2022-02-16 RX ORDER — ACETAMINOPHEN 325 MG/1
650 TABLET ORAL EVERY 6 HOURS PRN
Status: DISCONTINUED | OUTPATIENT
Start: 2022-02-16 | End: 2022-02-17 | Stop reason: HOSPADM

## 2022-02-16 RX ORDER — ONDANSETRON 4 MG/1
4 TABLET, ORALLY DISINTEGRATING ORAL EVERY 8 HOURS PRN
Status: DISCONTINUED | OUTPATIENT
Start: 2022-02-16 | End: 2022-02-17 | Stop reason: HOSPADM

## 2022-02-16 RX ORDER — POLYETHYLENE GLYCOL 3350 17 G/17G
17 POWDER, FOR SOLUTION ORAL DAILY PRN
Status: DISCONTINUED | OUTPATIENT
Start: 2022-02-16 | End: 2022-02-17 | Stop reason: HOSPADM

## 2022-02-16 RX ORDER — ESCITALOPRAM OXALATE 10 MG/1
10 TABLET ORAL DAILY
Status: DISCONTINUED | OUTPATIENT
Start: 2022-02-16 | End: 2022-02-17 | Stop reason: HOSPADM

## 2022-02-16 RX ORDER — LISINOPRIL 5 MG/1
5 TABLET ORAL DAILY
Status: DISCONTINUED | OUTPATIENT
Start: 2022-02-16 | End: 2022-02-17 | Stop reason: HOSPADM

## 2022-02-16 RX ORDER — MECOBALAMIN 5000 MCG
5 TABLET,DISINTEGRATING ORAL NIGHTLY PRN
Status: DISCONTINUED | OUTPATIENT
Start: 2022-02-16 | End: 2022-02-16

## 2022-02-16 RX ORDER — SODIUM CHLORIDE 0.9 % (FLUSH) 0.9 %
5-40 SYRINGE (ML) INJECTION EVERY 12 HOURS SCHEDULED
Status: DISCONTINUED | OUTPATIENT
Start: 2022-02-16 | End: 2022-02-17 | Stop reason: HOSPADM

## 2022-02-16 RX ORDER — ONDANSETRON 2 MG/ML
4 INJECTION INTRAMUSCULAR; INTRAVENOUS EVERY 6 HOURS PRN
Status: DISCONTINUED | OUTPATIENT
Start: 2022-02-16 | End: 2022-02-17 | Stop reason: HOSPADM

## 2022-02-16 RX ORDER — FUROSEMIDE 10 MG/ML
40 INJECTION INTRAMUSCULAR; INTRAVENOUS 2 TIMES DAILY
Status: DISCONTINUED | OUTPATIENT
Start: 2022-02-16 | End: 2022-02-17 | Stop reason: HOSPADM

## 2022-02-16 RX ORDER — SODIUM CHLORIDE 0.9 % (FLUSH) 0.9 %
10 SYRINGE (ML) INJECTION PRN
Status: DISCONTINUED | OUTPATIENT
Start: 2022-02-16 | End: 2022-02-16 | Stop reason: SDUPTHER

## 2022-02-16 RX ORDER — FUROSEMIDE 10 MG/ML
20 INJECTION INTRAMUSCULAR; INTRAVENOUS ONCE
Status: COMPLETED | OUTPATIENT
Start: 2022-02-16 | End: 2022-02-16

## 2022-02-16 RX ADMIN — TETROFOSMIN 30 MILLICURIE: 1.38 INJECTION, POWDER, LYOPHILIZED, FOR SOLUTION INTRAVENOUS at 13:04

## 2022-02-16 RX ADMIN — LISINOPRIL 5 MG: 5 TABLET ORAL at 17:20

## 2022-02-16 RX ADMIN — SODIUM CHLORIDE, PRESERVATIVE FREE 10 ML: 5 INJECTION INTRAVENOUS at 17:21

## 2022-02-16 RX ADMIN — FUROSEMIDE 40 MG: 10 INJECTION, SOLUTION INTRAVENOUS at 17:20

## 2022-02-16 RX ADMIN — Medication 5 MG: at 20:48

## 2022-02-16 RX ADMIN — Medication 10 ML: at 13:04

## 2022-02-16 RX ADMIN — FUROSEMIDE 20 MG: 10 INJECTION, SOLUTION INTRAMUSCULAR; INTRAVENOUS at 09:05

## 2022-02-16 RX ADMIN — REGADENOSON 0.4 MG: 0.08 INJECTION, SOLUTION INTRAVENOUS at 13:03

## 2022-02-16 RX ADMIN — TETROFOSMIN 10 MILLICURIE: 1.38 INJECTION, POWDER, LYOPHILIZED, FOR SOLUTION INTRAVENOUS at 11:18

## 2022-02-16 RX ADMIN — ENOXAPARIN SODIUM 30 MG: 30 INJECTION SUBCUTANEOUS at 20:48

## 2022-02-16 RX ADMIN — ESCITALOPRAM OXALATE 10 MG: 10 TABLET ORAL at 17:20

## 2022-02-16 RX ADMIN — Medication 10 ML: at 11:18

## 2022-02-16 RX ADMIN — SODIUM CHLORIDE, PRESERVATIVE FREE 10 ML: 5 INJECTION INTRAVENOUS at 20:48

## 2022-02-16 ASSESSMENT — ENCOUNTER SYMPTOMS
GASTROINTESTINAL NEGATIVE: 1
SHORTNESS OF BREATH: 1
COUGH: 0
EYES NEGATIVE: 1

## 2022-02-16 ASSESSMENT — PAIN SCALES - GENERAL
PAINLEVEL_OUTOF10: 0
PAINLEVEL_OUTOF10: 4

## 2022-02-16 ASSESSMENT — PAIN DESCRIPTION - ONSET: ONSET: ON-GOING

## 2022-02-16 ASSESSMENT — PAIN DESCRIPTION - DESCRIPTORS: DESCRIPTORS: TIGHTNESS

## 2022-02-16 ASSESSMENT — PAIN DESCRIPTION - PROGRESSION: CLINICAL_PROGRESSION: NOT CHANGED

## 2022-02-16 ASSESSMENT — PAIN DESCRIPTION - LOCATION: LOCATION: CHEST

## 2022-02-16 ASSESSMENT — PAIN DESCRIPTION - PAIN TYPE: TYPE: ACUTE PAIN

## 2022-02-16 ASSESSMENT — PAIN DESCRIPTION - FREQUENCY: FREQUENCY: CONTINUOUS

## 2022-02-16 NOTE — ED PROVIDER NOTES
[address] [facility]          ATTENDING PHYSICIAN NOTE       Date of evaluation: 2/16/2022    Chief Complaint     Chest Pain and Shortness of Breath      History of Present Illness     Axel Pizarro is a 64 y.o. female who presents to the emergency department complaining of shortness of breath and chest pain. Patient states she started to feel unwell yesterday evening. She states she felt generally weak and out of breath. She also notes an achy pain to the anterior and posterior chest that is worse with deep inspiration. She denies any fevers or chills. She denies any cough. She denies any swelling or pain in her extremities. She states this morning she woke up to get ready for work and felt worse so came to the emergency department for evaluation. She denies having symptoms like this in the past.  She has not tried taking anything for her symptoms. Review of Systems     Review of Systems   Constitutional: Negative. HENT: Negative. Eyes: Negative. Respiratory: Positive for shortness of breath. Negative for cough. Cardiovascular: Positive for chest pain. Negative for palpitations. Gastrointestinal: Negative. Genitourinary: Negative. Musculoskeletal: Negative. Neurological: Negative. All other systems reviewed and are negative. Past Medical, Surgical, Family, and Social History     She has a past medical history of Age-related osteoporosis without current pathological fracture, Anxiety, Colon polyp, Depression, Hypoglycemia, and Vitamin B12 deficiency. She has a past surgical history that includes Gastric bypass surgery (2000); Hysterectomy (1978); Cholecystectomy (2001); Colonoscopy (9/2012); Appendectomy; Tonsillectomy; and cervical fusion (N/A, 11/7/2019). Her family history includes Cancer in her brother, father, and mother. She reports that she quit smoking about 17 years ago. She started smoking about 47 years ago.  She has a 30.00 pack-year smoking history. She has never used smokeless tobacco. She reports current alcohol use. She reports that she does not use drugs. Medications     Previous Medications    AMOXICILLIN (AMOXIL) 875 MG TABLET    TAKE 1 TABLET BY MOUTH TWICE DAILY FOR 10 DAYS    CALCIUM-VITAMIN D (OSCAL-500) 500-200 MG-UNIT PER TABLET    Take 1 tablet by mouth daily    CHOLECALCIFEROL (VITAMIN D3 PO)    Take 2,000 Units by mouth daily    ESCITALOPRAM (LEXAPRO) 10 MG TABLET    TAKE 1 TABLET BY MOUTH DAILY. FERROUS SULFATE (IRON PO)    Take 90 mg by mouth daily    FLUTICASONE (FLONASE) 50 MCG/ACT NASAL SPRAY    1 spray by Each Nostril route 2 times daily    LIDOCAINE 4 % EXTERNAL PATCH    Place 1 patch onto the skin daily    SENNOSIDES-DOCUSATE SODIUM (SENOKOT-S) 8.6-50 MG TABLET    Take 2 tablets by mouth daily       Allergies     She is allergic to seasonal.    Physical Exam     INITIAL VITALS: BP: (!) 172/82, Temp: 98.2 °F (36.8 °C), Pulse: 87, Resp: 22, SpO2: 97 %   Physical Exam  Vitals and nursing note reviewed. Constitutional:       General: She is not in acute distress. HENT:      Head: Normocephalic and atraumatic. Mouth/Throat:      Mouth: Mucous membranes are moist.      Pharynx: No oropharyngeal exudate. Eyes:      General: No scleral icterus. Extraocular Movements: Extraocular movements intact. Conjunctiva/sclera: Conjunctivae normal.      Pupils: Pupils are equal, round, and reactive to light. Cardiovascular:      Rate and Rhythm: Normal rate and regular rhythm. Heart sounds: Normal heart sounds. Pulmonary:      Effort: Pulmonary effort is normal.      Breath sounds: Normal breath sounds. No wheezing, rhonchi or rales. Abdominal:      General: Bowel sounds are normal.      Palpations: Abdomen is soft. Tenderness: There is no abdominal tenderness. There is no guarding or rebound. Musculoskeletal:         General: No swelling. Normal range of motion.       Cervical back: Normal range of motion and neck supple. Skin:     General: Skin is warm and dry. Neurological:      General: No focal deficit present. Mental Status: She is alert and oriented to person, place, and time. Cranial Nerves: No cranial nerve deficit. Motor: No weakness. Coordination: Coordination normal.         Diagnostic Results     EKG   EKG as interpreted by me shows patient to be in a normal sinus rhythm with a normal axis, normal RI and QT intervals, normal QRS duration, no ST segment abnormalities, no T wave abnormalities. RADIOLOGY:  XR CHEST PORTABLE   Final Result     No evidence of acute cardiopulmonary disease.               LABS:   Results for orders placed or performed during the hospital encounter of 02/16/22   CBC auto differential   Result Value Ref Range    WBC 3.5 (L) 4.0 - 11.0 K/uL    RBC 3.90 (L) 4.00 - 5.20 M/uL    Hemoglobin 12.5 12.0 - 16.0 g/dL    Hematocrit 36.5 36.0 - 48.0 %    MCV 93.4 80.0 - 100.0 fL    MCH 32.1 26.0 - 34.0 pg    MCHC 34.4 31.0 - 36.0 g/dL    RDW 13.4 12.4 - 15.4 %    Platelets 050 288 - 001 K/uL    MPV 6.5 5.0 - 10.5 fL    Neutrophils % 54.1 %    Lymphocytes % 33.4 %    Monocytes % 10.5 %    Eosinophils % 1.4 %    Basophils % 0.6 %    Neutrophils Absolute 1.9 1.7 - 7.7 K/uL    Lymphocytes Absolute 1.2 1.0 - 5.1 K/uL    Monocytes Absolute 0.4 0.0 - 1.3 K/uL    Eosinophils Absolute 0.0 0.0 - 0.6 K/uL    Basophils Absolute 0.0 0.0 - 0.2 K/uL   Basic Metabolic Panel (EP - 1)   Result Value Ref Range    Sodium 137 136 - 145 mmol/L    Potassium 4.3 3.5 - 5.1 mmol/L    Chloride 103 99 - 110 mmol/L    CO2 27 21 - 32 mmol/L    Anion Gap 7 3 - 16    Glucose 109 (H) 70 - 99 mg/dL    BUN 15 7 - 20 mg/dL    CREATININE 0.5 (L) 0.6 - 1.2 mg/dL    GFR Non-African American >60 >60    GFR African American >60 >60    Calcium 9.2 8.3 - 10.6 mg/dL   Troponin   Result Value Ref Range    Troponin <0.01 <0.01 ng/mL   Brain Natriuretic Peptide   Result Value Ref Range    Pro-BNP 244 0 - 449 pg/mL   Blood Gas, Venous   Result Value Ref Range    pH, Sriram 7.437 7.350 - 7.450    pCO2, Sriram 44.2 41.0 - 51.0 mmHg    pO2, Sriram 65.9 (H) 25.0 - 40.0 mmHg    HCO3, Venous 29.8 (H) 24.0 - 28.0 mmol/L    Base Excess, Sriram 4.8 (H) -2.0 - 3.0 mmol/L    O2 Sat, Sriram 92 Not established %    Carboxyhemoglobin 0.8 0.0 - 1.5 %    MetHgb, Sriram 1.3 0.0 - 1.5 %    TC02 (Calc), Sriram 31 mmol/L    Hemoglobin, Sriram, Reduced 7.40 %     RECENT VITALS:  BP: (!) 172/82,Temp: 98.2 °F (36.8 °C), Pulse: 87, Resp: 22, SpO2: 97 %     Procedures      N/A    ED Course     Nursing Notes, Past Medical Hx, Past Surgical Hx, Social Hx,Allergies, and Family Hx were reviewed. patient was given the following medications:  No orders of the defined types were placed in this encounter. CONSULTS:  None    MEDICAL DECISIONMAKING / ASSESSMENT / PLAN     Michael Sandhu is a 64 y.o. female presents complaining of shortness of breath and chest pain. Patient states she felt generally weak yesterday and then today developed shortness of breath as well as achy pain to the anterior and posterior chest.  On arrival, patient is mildly hypertensive but otherwise hemodynamically stable. She has clear breath sounds and normal heart sounds. She has no reproducible chest wall or back tenderness. EKG shows no acute ischemic abnormalities. Laboratory studies are unremarkable including negative troponin. Chest x-ray is unremarkable. Repeat troponin is pending. At this time, care of the patient be turned over to the oncoming provider who complete the patient's work-up and disposition with anticipation of stress test through the emergency department if the second troponin is negative. Clinical Impression     No diagnosis found. Disposition     PATIENT REFERRED TO:  No follow-up provider specified.     DISCHARGE MEDICATIONS:  New Prescriptions    No medications on file       Tram Boo MD  02/16/22 [id number]

## 2022-02-16 NOTE — ED PROVIDER NOTES
[address] [facility] ENCOUNTER          ATTENDING PHYSICIAN NOTE       Date of evaluation: 2/16/2022    ADDENDUM:      Care of this patient was assumed from Dr. Eben Siddiqui. The patient was seen for Chest Pain and Shortness of Breath  . The patient's initial evaluation and plan have been discussed with the prior provider who initially evaluated the patient. Nursing Notes, Past Medical Hx, Past Surgical Hx, Social Hx, Allergies, and Family Hx were all reviewed. Diagnostic Results     EKG       RADIOLOGY:  XR CHEST PORTABLE   Final Result     No evidence of acute cardiopulmonary disease.               LABS:   Results for orders placed or performed during the hospital encounter of 02/16/22   CBC auto differential   Result Value Ref Range    WBC 3.5 (L) 4.0 - 11.0 K/uL    RBC 3.90 (L) 4.00 - 5.20 M/uL    Hemoglobin 12.5 12.0 - 16.0 g/dL    Hematocrit 36.5 36.0 - 48.0 %    MCV 93.4 80.0 - 100.0 fL    MCH 32.1 26.0 - 34.0 pg    MCHC 34.4 31.0 - 36.0 g/dL    RDW 13.4 12.4 - 15.4 %    Platelets 324 226 - 315 K/uL    MPV 6.5 5.0 - 10.5 fL    Neutrophils % 54.1 %    Lymphocytes % 33.4 %    Monocytes % 10.5 %    Eosinophils % 1.4 %    Basophils % 0.6 %    Neutrophils Absolute 1.9 1.7 - 7.7 K/uL    Lymphocytes Absolute 1.2 1.0 - 5.1 K/uL    Monocytes Absolute 0.4 0.0 - 1.3 K/uL    Eosinophils Absolute 0.0 0.0 - 0.6 K/uL    Basophils Absolute 0.0 0.0 - 0.2 K/uL   Basic Metabolic Panel (EP - 1)   Result Value Ref Range    Sodium 137 136 - 145 mmol/L    Potassium 4.3 3.5 - 5.1 mmol/L    Chloride 103 99 - 110 mmol/L    CO2 27 21 - 32 mmol/L    Anion Gap 7 3 - 16    Glucose 109 (H) 70 - 99 mg/dL    BUN 15 7 - 20 mg/dL    CREATININE 0.5 (L) 0.6 - 1.2 mg/dL    GFR Non-African American >60 >60    GFR African American >60 >60    Calcium 9.2 8.3 - 10.6 mg/dL   Troponin   Result Value Ref Range    Troponin <0.01 <0.01 ng/mL   Brain Natriuretic Peptide   Result Value Ref Range    Pro- 0 - 449 pg/mL   Blood Gas, Venous   Result Value Ref Range    pH, Sriram 7.437 7.350 - 7.450    pCO2, Sriram 44.2 41.0 - 51.0 mmHg    pO2, Sriram 65.9 (H) 25.0 - 40.0 mmHg    HCO3, Venous 29.8 (H) 24.0 - 28.0 mmol/L    Base Excess, Sriram 4.8 (H) -2.0 - 3.0 mmol/L    O2 Sat, Sriram 92 Not established %    Carboxyhemoglobin 0.8 0.0 - 1.5 %    MetHgb, Sriram 1.3 0.0 - 1.5 %    TC02 (Calc), Sriram 31 mmol/L    Hemoglobin, Sriram, Reduced 7.40 %   Troponin   Result Value Ref Range    Troponin <0.01 <0.01 ng/mL       RECENT VITALS:  BP: (!) 146/64, Temp: 98.2 °F (36.8 °C), Pulse: 86, Resp: 18, SpO2: 98 %     Procedures         ED Course     The patient was given the following medications:  Orders Placed This Encounter   Medications    furosemide (LASIX) injection 20 mg       CONSULTS:  IP CONSULT TO HOSPITALIST    [facility] / [place] / Humberto Tray is a 64 y.o. female who was turned over to me pending completion of her laboratory studies. Troponin x2 were negative on my reevaluation she continues to note some chest discomfort and dyspnea on exertion. She notes a recent weight gain. On chart review it appears that she has had a decrease in her ejection fraction on her last echocardiogram performed 2 years ago. Clinically there is concern for possible heart failure including diastolic dysfunction. I will give her a small dose of Lasix and after shared decision making, we will be admitting her for further cardiac stratification. Clinical Impression     1. Chest pain, unspecified type    2. Dyspnea on exertion        Disposition     PATIENT REFERRED TO:  No follow-up provider specified.     DISCHARGE MEDICATIONS:  New Prescriptions    No medications on file       DISPOSITION Admitted 02/16/2022 09:18:50 AM       Mac Osgood, MD  02/16/22 1478

## 2022-02-16 NOTE — PLAN OF CARE
Problem: OXYGENATION/RESPIRATORY FUNCTION  Goal: Patient will maintain patent airway  Outcome: Ongoing  Goal: Patient will achieve/maintain normal respiratory rate/effort  Description: Respiratory rate and effort will be within normal limits for the patient  Outcome: Ongoing     Problem: HEMODYNAMIC STATUS  Goal: Patient has stable vital signs and fluid balance  Outcome: Ongoing     Problem: FLUID AND ELECTROLYTE IMBALANCE  Goal: Fluid and electrolyte balance are achieved/maintained  Outcome: Ongoing     Problem: Pain:  Goal: Patient's pain/discomfort is manageable  Description: Patient's pain/discomfort is manageable  Outcome: Ongoing   No complaints of pain at this time. Will continue to assess pain level. Problem: Skin Integrity:  Goal: Skin integrity will stabilize  Description: Skin integrity will stabilize  Outcome: Ongoing   Patient's skin is clean, dry, and intact. Will continue to monitor.

## 2022-02-16 NOTE — H&P
Hospital Medicine History & Physical      PCP: Cheryl Navarrete MD    Date of Admission: 2/16/2022    Date of Service: Pt seen/examined on 2/16/2022 and Admitted to Inpatient with expected LOS greater than two midnights due to medical therapy. Chief Complaint:  SOB      History Of Present Illness:  Soco Myles 64 y.o. female with history of depression came into ER with a complaint of back pain, shortness of breath. Patient reported last night she woke up with shortness of breath and upper back pain. For last 1 week she has been feeling exhausted lack of energy cannot recall if she was shortness of breath or something else going on. Never had the symptoms before denies any chest pain, lightheadedness, dizziness, change in weight. Patient report quit smoking 5 to 10 years back has 40-pack-year history of smoking. On arrival to ER hemodynamically stable. EKG normal sinus rhythm, T wave inversion in aVR, V1. Past Medical History:          Diagnosis Date    Age-related osteoporosis without current pathological fracture 11/8/2019    Anxiety     Colon polyp     Depression     Hypoglycemia     Vitamin B12 deficiency        Past Surgical History:          Procedure Laterality Date    APPENDECTOMY      CERVICAL FUSION N/A 11/7/2019    C1-2 FUSION POSTERIOR performed by Andres Silva MD at [facility] 5  9/2012    q 5    GASTRIC BYPASS SURGERY  2000    HYSTERECTOMY  1978    TONSILLECTOMY         Medications Prior to Admission:      Prior to Admission medications    Medication Sig Start Date End Date Taking? Authorizing Provider   fluticasone (FLONASE) 50 MCG/ACT nasal spray 1 spray by Each Nostril route 2 times daily 12/22/21   Guerita Martin MD   escitalopram (LEXAPRO) 10 MG tablet TAKE 1 TABLET BY MOUTH DAILY.  5/14/21   Cheryl Navarrete MD   amoxicillin (AMOXIL) 875 MG tablet TAKE 1 TABLET BY MOUTH TWICE DAILY FOR 10 DAYS Rales/Wheezes/Rhonchi. Cardiovascular:  Regular rate and rhythm with normal S1/S2 without murmurs, rubs or gallops. Abdomen: Soft, non-tender, non-distended with normal bowel sounds. Musculoskeletal:  No clubbing, cyanosis. 2+ bilateral lower extremity edema. Full range of motion without deformity. Skin: Skin color, texture, turgor normal.  No rashes or lesions. Neurologic:  Neurovascularly intact without any focal sensory/motor deficits. Cranial nerves: II-XII intact, grossly non-focal.  Psychiatric:  Alert and oriented, thought content appropriate, normal insight  Capillary Refill: Brisk,< 3 seconds   Peripheral Pulses: +2 palpable, equal bilaterally       Labs:     Recent Labs     02/16/22  0534   WBC 3.5*   HGB 12.5   HCT 36.5        Recent Labs     02/16/22  0534      K 4.3      CO2 27   BUN 15   CREATININE 0.5*   CALCIUM 9.2     No results for input(s): AST, ALT, BILIDIR, BILITOT, ALKPHOS in the last 72 hours. No results for input(s): INR in the last 72 hours. Recent Labs     02/16/22  0534 02/16/22  0733   TROPONINI <0.01 <0.01       Urinalysis:      Lab Results   Component Value Date    BLOODU Moderate 01/26/2015    SPECGRAV 1.030 01/26/2015    GLUCOSEU - 01/26/2015       Radiology:     CXR: I have reviewed the CXR with the following interpretation: No evidence of acute cardiopulmonary process. EKG:  I have reviewed the EKG with the following interpretation: Normal sinus rhythm, T wave inversion in aVR, V1.    XR CHEST PORTABLE   Final Result     No evidence of acute cardiopulmonary disease. NM MYOCARDIAL SPECT REST EXERCISE OR RX    (Results Pending)       ASSESSMENT:    There are no active hospital problems to display for this patient. #Shortness of breath  Concern for CHF diastolic, rule out ACS. We will obtain a stress test.  Echocardiogram.  Start on Lasix 40 twice daily  Strict ERENDIRA's, daily weight distention lisinopril 5 mg daily.     Bilateral lower extremity

## 2022-02-16 NOTE — PROGRESS NOTES
Patient admitted to room 4316 from ED. Oriented to room, call light, and floor policies. Plan of care reviewed with patient/family. Pt is resting in bed and 0/10 pain at this time; no s/s of distress noted. Assessment completed; VSS. Tele in place reading NSR rhythm with a rate of 71. Pt rates a medium fall risk; pt up ad lyn. Safety precautions in place; call light and bedside table within reach. Pt encouraged to call for needs or ambulation. Pt VU. Will continue to monitor.

## 2022-02-17 ENCOUNTER — APPOINTMENT (OUTPATIENT)
Dept: VASCULAR LAB | Age: 77
DRG: 293 | End: 2022-02-17
Payer: MEDICARE

## 2022-02-17 VITALS
HEIGHT: 69 IN | OXYGEN SATURATION: 93 % | DIASTOLIC BLOOD PRESSURE: 73 MMHG | RESPIRATION RATE: 18 BRPM | BODY MASS INDEX: 29.28 KG/M2 | WEIGHT: 197.7 LBS | HEART RATE: 95 BPM | SYSTOLIC BLOOD PRESSURE: 108 MMHG | TEMPERATURE: 96.8 F

## 2022-02-17 LAB
ANION GAP SERPL CALCULATED.3IONS-SCNC: 13 MMOL/L (ref 3–16)
BUN BLDV-MCNC: 20 MG/DL (ref 7–20)
CALCIUM SERPL-MCNC: 9.5 MG/DL (ref 8.3–10.6)
CHLORIDE BLD-SCNC: 100 MMOL/L (ref 99–110)
CO2: 26 MMOL/L (ref 21–32)
CREAT SERPL-MCNC: 0.8 MG/DL (ref 0.6–1.2)
GFR AFRICAN AMERICAN: >60
GFR NON-AFRICAN AMERICAN: >60
GLUCOSE BLD-MCNC: 103 MG/DL (ref 70–99)
LV EF: 45 %
LVEF MODALITY: NORMAL
MAGNESIUM: 1.8 MG/DL (ref 1.8–2.4)
POTASSIUM SERPL-SCNC: 3.9 MMOL/L (ref 3.5–5.1)
SODIUM BLD-SCNC: 139 MMOL/L (ref 136–145)

## 2022-02-17 PROCEDURE — 2580000003 HC RX 258: Performed by: INTERNAL MEDICINE

## 2022-02-17 PROCEDURE — 80061 LIPID PANEL: CPT

## 2022-02-17 PROCEDURE — 6360000002 HC RX W HCPCS: Performed by: INTERNAL MEDICINE

## 2022-02-17 PROCEDURE — 93970 EXTREMITY STUDY: CPT

## 2022-02-17 PROCEDURE — 93306 TTE W/DOPPLER COMPLETE: CPT

## 2022-02-17 PROCEDURE — 36415 COLL VENOUS BLD VENIPUNCTURE: CPT

## 2022-02-17 PROCEDURE — 80048 BASIC METABOLIC PNL TOTAL CA: CPT

## 2022-02-17 PROCEDURE — 6370000000 HC RX 637 (ALT 250 FOR IP): Performed by: INTERNAL MEDICINE

## 2022-02-17 PROCEDURE — 83735 ASSAY OF MAGNESIUM: CPT

## 2022-02-17 RX ORDER — FUROSEMIDE 40 MG/1
40 TABLET ORAL DAILY
Qty: 30 TABLET | Refills: 0 | Status: SHIPPED | OUTPATIENT
Start: 2022-02-17 | End: 2022-03-28 | Stop reason: SDUPTHER

## 2022-02-17 RX ADMIN — LISINOPRIL 5 MG: 5 TABLET ORAL at 11:07

## 2022-02-17 RX ADMIN — ESCITALOPRAM OXALATE 10 MG: 10 TABLET ORAL at 07:44

## 2022-02-17 RX ADMIN — FUROSEMIDE 40 MG: 10 INJECTION, SOLUTION INTRAVENOUS at 11:07

## 2022-02-17 RX ADMIN — SODIUM CHLORIDE, PRESERVATIVE FREE 10 ML: 5 INJECTION INTRAVENOUS at 10:51

## 2022-02-17 RX ADMIN — ENOXAPARIN SODIUM 30 MG: 30 INJECTION SUBCUTANEOUS at 11:07

## 2022-02-17 ASSESSMENT — PAIN SCALES - GENERAL
PAINLEVEL_OUTOF10: 0

## 2022-02-17 NOTE — PLAN OF CARE
Problem: OXYGENATION/RESPIRATORY FUNCTION  Goal: Patient will maintain patent airway  2/17/2022 0437 by Nichol Regalado RN  Outcome: Ongoing  Note: Patient satting above 93% on Ra. Denies any SOB. Problem: HEMODYNAMIC STATUS  Goal: Patient has stable vital signs and fluid balance  2/17/2022 0437 by Nichol Regalado RN  Outcome: Ongoing     Problem: Infection:  Goal: Will remain free from infection  Description: Will remain free from infection  Outcome: Ongoing  Note:   Patient remains afebrile this shift. Problem: Pain:  Goal: Patient's pain/discomfort is manageable  Description: Patient's pain/discomfort is manageable  2/17/2022 0437 by Nichol Regalado RN  Outcome: Ongoing  Note: Patient denies any pain, will continue to assess pain level.

## 2022-02-17 NOTE — PLAN OF CARE
Problem: OXYGENATION/RESPIRATORY FUNCTION  Goal: Patient will maintain patent airway  2/17/2022 0437 by Dalia Marques RN  Outcome: Ongoing  Note: Patient satting above 93% on Ra. Denies any SOB.

## 2022-02-17 NOTE — DISCHARGE INSTR - COC
Continuity of Care Form    Patient Name: Jimenez Blackwood   :  [id number]  MRN:  [id number]    Admit date:  2022  Discharge date:  ***    Code Status Order: Limited   Advance Directives:      Admitting Physician:  Albino Turcios MD  PCP: Qian Dennis MD    Discharging Nurse: [facility] Unit/Room#: 5251/0990-23  Discharging Unit Phone Number: ***    Emergency Contact:   Extended Emergency Contact Information  Primary Emergency Contact: [address] Phone: [phone]  Mobile Phone: [phone]  Relation: Child   needed? No  Secondary Emergency Contact: Jane Chan   [address] Phone: [phone]  Mobile Phone: [phone]  Relation: Child   needed? No    Past Surgical History:  Past Surgical History:   Procedure Laterality Date    APPENDECTOMY      CERVICAL FUSION N/A 2019    C1-2 FUSION POSTERIOR performed by Gerhardt Heading. [place], MD at [facility]  2012    q 5    GASTRIC BYPASS SURGERY      HYSTERECTOMY  1978    TONSILLECTOMY         Immunization History:   Immunization History   Administered Date(s) Administered    Influenza Virus Vaccine 2012, 10/15/2014    Influenza, Quadv, IM, PF (6 mo and older Fluzone, Flulaval, Fluarix, and 3 yrs and older Afluria) 2019    Influenza, Triv, inactivated, subunit, adjuvanted, IM (Fluad 65 yrs and older) 10/26/2020    Pneumococcal Conjugate 13-valent (Udorixe87) 04/10/2015    Pneumococcal Polysaccharide (Cmnxphreg43) 2012, 10/16/2012       Active Problems:  Patient Active Problem List   Diagnosis Code    Depression F32. A    Anxiety F41.9    S/P gastric bypass Z98.84    Iron deficiency anemia D50.9    GERD (gastroesophageal reflux disease) K21.9    Fatigue R53.83    Lower back pain M54.50    Colon polyp K63.5    Ex-smoker Z87.891    Acute cystitis with hematuria N30.01    Urinary frequency R35.0    Stenosis of left external Concerns:469023223}    Impairments/Disabilities:      [address] Impairments/Disabilities:374961609}    Nutrition Therapy:  Current Nutrition Therapy:   [address] Diet List:157971506}    Routes of Feeding: {CHP DME Other Feedings:023741020}  Liquids: {Slp liquid thickness:30622}  Daily Fluid Restriction: {CHP DME Yes amt example:504972227}  Last Modified Barium Swallow with Video (Video Swallowing Test): {Done Not Done FIOQ:300757787}    Treatments at the Time of Hospital Discharge:   Respiratory Treatments: ***  Oxygen Therapy:  {Therapy; copd oxygen:94185}  Ventilator:    { CC Vent VHFT:450375405}    Rehab Therapies: {THERAPEUTIC INTERVENTION:9208856050}  Weight Bearing Status/Restrictions: [address] Weight Bearin}  Other Medical Equipment (for information only, NOT a DME order):  {EQUIPMENT:570390867}  Other Treatments: ***    Patient's personal belongings (please select all that are sent with patient):  {CHP DME Belongings:426866105}    RN SIGNATURE:  {Esignature:926873523}    CASE MANAGEMENT/SOCIAL WORK SECTION    Inpatient Status Date: 22    Readmission Risk Assessment Score:  Readmission Risk              Risk of Unplanned Readmission:  10           Discharging to Facility/ Agency   Home no services    Dialysis Facility (if applicable)   N/a    / signature: Electronically signed by Herlinda Buerger, MSW, LSW on 22 at 3:48 PM EST    PHYSICIAN SECTION    Prognosis: {Prognosis:8540238822}    Condition at Discharge: [address] Patient Condition:709654282}    Rehab Potential (if transferring to Rehab): {Prognosis:5072932677}    Recommended Labs or Other Treatments After Discharge: ***    Physician Certification: I certify the above information and transfer of Avery Bloch  is necessary for the continuing treatment of the diagnosis listed and that she requires {Admit to Appropriate Level of Care:47162} for {GREATER/LESS:010164258} 30 days.      Update Admission H&P: {CHP DME Changes in SKJKV:221054398}    PHYSICIAN SIGNATURE:  {Esignature:421962292}

## 2022-02-17 NOTE — PROGRESS NOTES
Hospitalist Progress Note      PCP: Kaden Callejas MD    Date of Admission: 2/16/2022    Chief Complaint: Dyspnea    Hospital Course: Coy Gan 64 y.o. female with history of depression came into ER with a complaint of back pain and shortness of breath which woke her up from sleep.  has a 40-pack-year history of smoking. Stress test low risk scan    Subjective: Patient reports that she is feeling a lot better today. Denies any nausea, vomiting, fever, chills. No acute event reported overnight. Medications:  Reviewed    Infusion Medications    sodium chloride       Scheduled Medications    escitalopram  10 mg Oral Daily    sodium chloride flush  5-40 mL IntraVENous 2 times per day    enoxaparin  30 mg SubCUTAneous BID    lisinopril  5 mg Oral Daily    furosemide  40 mg IntraVENous BID     PRN Meds: sodium chloride flush, sodium chloride, ondansetron **OR** ondansetron, polyethylene glycol, acetaminophen **OR** acetaminophen, perflutren lipid microspheres, melatonin      Intake/Output Summary (Last 24 hours) at 2/17/2022 1144  Last data filed at 2/17/2022 1056  Gross per 24 hour   Intake 600 ml   Output 200 ml   Net 400 ml       Physical Exam Performed:    /73   Pulse 95   Temp 96.8 °F (36 °C) (Oral)   Resp 18   Ht 5' 9\" (1.753 m)   Wt 197 lb 11.2 oz (89.7 kg)   SpO2 93%   BMI 29.20 kg/m²     General appearance: No apparent distress, appears stated age and cooperative. HEENT: Pupils equal, round, and reactive to light. Conjunctivae/corneas clear. Neck: Supple, with full range of motion. No jugular venous distention. Trachea midline. Respiratory:  Normal respiratory effort. Clear to auscultation, bilaterally without Rales/Wheezes/Rhonchi. Cardiovascular: Regular rate and rhythm with normal S1/S2 without murmurs, rubs or gallops. Abdomen: Soft, non-tender, non-distended with normal bowel sounds. Musculoskeletal: No clubbing, cyanosis. 1+ edema bilaterally on LE.   Full range of motion without deformity. Skin: Skin color, texture, turgor normal.  No rashes or lesions. Neurologic:  Neurovascularly intact without any focal sensory/motor deficits. Cranial nerves: II-XII intact, grossly non-focal.  Psychiatric: Alert and oriented, thought content appropriate, normal insight  Capillary Refill: Brisk,< 3 seconds   Peripheral Pulses: +2 palpable, equal bilaterally       Labs:   Recent Labs     02/16/22  0534   WBC 3.5*   HGB 12.5   HCT 36.5        Recent Labs     02/16/22  0534 02/17/22  0445    139   K 4.3 3.9    100   CO2 27 26   BUN 15 20   CREATININE 0.5* 0.8   CALCIUM 9.2 9.5     No results for input(s): AST, ALT, BILIDIR, BILITOT, ALKPHOS in the last 72 hours. No results for input(s): INR in the last 72 hours. Recent Labs     02/16/22  0534 02/16/22  0733   TROPONINI <0.01 <0.01       Urinalysis:      Lab Results   Component Value Date    BLOODU Moderate 01/26/2015    SPECGRAV 1.030 01/26/2015    GLUCOSEU - 01/26/2015       Radiology:  VL Extremity Venous Bilateral         NM MYOCARDIAL SPECT REST EXERCISE OR RX   Final Result      XR CHEST PORTABLE   Final Result     No evidence of acute cardiopulmonary disease. Assessment/Plan:    There are no active hospital problems to display for this patient. #Shortness of breath suspected due to new onset diastolic heart failure  Nuclear stress test low risk scan. Echocardiogram pending. Continue Lasix 40 twice daily  Strict ERENDIRA's discussed with RN regarding monitoring urine output. Daily weight. #Bilateral lower extremity was suspected due to CHF  Duplex lower extremity negative for DVT. Swelling is getting better. #Depression  Continue citalopram      DVT Prophylaxis: Lovenox  Diet: ADULT DIET; Regular; Low Sodium (2 gm); 1800 ml  Code Status: Limited    PT/OT Eval Status: N/A    Dispo -inpatient.   Follow-up on echocardiogram. D/C in 24 hours    Heather Estrada MD     This chart was likely completed using voice recognition technology and may contain unintended grammatical , phraseology,and/or punctuation errors

## 2022-02-17 NOTE — CONSULTS
Nutrition Education    RD consulted for CHF diet edu. RD discussed low sodium foods, how to identify a low sodium food using the nutrition label, fluid restriction and heart healthy eating (limiting saturated fats, avoiding trans fats, consuming adequate amounts of fruits, vegetables, whole grains). All questions and concerns answered at this time. · Verbally reviewed information with Patient/Family  · Educated on Heart Failure Nutrition Therapy and heart healthy diet   · Written educational materials provided. · Contact name and number provided. · Refer to Patient Education activity for more details.     Electronically signed by Elliot Howell, MS, RD, LD on 2/17/22 at 4:11 PM EST    Contact: [phone]

## 2022-02-17 NOTE — CARE COORDINATION
bedside if staff is available. (payment due at time of pick-up or delivery - cash, check, or card accepted)     Able to afford home medications/ co-pay costs: Yes    ADLS:  Current PT AM-PAC Score:   /24  Current OT AM-PAC Score:   /24      DISCHARGE Disposition: Home- No Services Needed    LOC at discharge: Not Applicable  SHONNA Completed: Yes    Notification completed in HENS/PAS?:  Not Applicable    IMM Completed:   Not Indicated    Transportation:  Transportation PLAN for discharge: family   Mode of Transport: Private Car     Home Care:  [address] ordered at discharge: No    Durable Medical Equipment:  DME Provider: none  Equipment obtained during hospitalization: none    Home Oxygen and Respiratory Equipment:  Oxygen needed at discharge?: No    Dialysis:  Dialysis patient: No    Referrals made at [facility] for outpatient continued care:  Not Applicable    Additional CM Notes:   SW met w/Pt and her family at bedside. Pt is from home alone and independent at baseline. Pt doesn't have [facility] or DME and doesn't need any at [place]. Pt wanted to speak to the [facility] sent message via perfect serve. Pt is able to   Pt's family will transport her home. The Plan for Transition of Care is related to the following treatment goals of Dyspnea on exertion [R06.00]  Chest pain, unspecified type [R07.9]  New onset of congestive heart failure ([address] [I50.9]    The Patient and/or patient representative Miguel Jena and her family were provided with a choice of provider and agrees with the discharge plan Yes    Freedom of choice list was provided with basic dialogue that supports the patient's individualized plan of care/goals and shares the quality data associated with the providers.  Yes    Care Transitions patient: Yes    STACEY Henderson, Deonna Ji  [facility] [email or web address]  Case Management Department  Ph: [phone]  Fax: [phone]

## 2022-02-18 ENCOUNTER — TELEPHONE (OUTPATIENT)
Dept: FAMILY MEDICINE CLINIC | Age: 77
End: 2022-02-18

## 2022-02-18 LAB
EKG ATRIAL RATE: 87 BPM
EKG DIAGNOSIS: NORMAL
EKG P AXIS: 46 DEGREES
EKG P-R INTERVAL: 148 MS
EKG Q-T INTERVAL: 388 MS
EKG QRS DURATION: 88 MS
EKG QTC CALCULATION (BAZETT): 466 MS
EKG R AXIS: -6 DEGREES
EKG T AXIS: -20 DEGREES
EKG VENTRICULAR RATE: 87 BPM

## 2022-02-18 NOTE — TELEPHONE ENCOUNTER
Pt wants Dr Max Feng to know she is out of the hospital   She is seeing Dr Rene Ca, cardiologist at [facility]  Dr Rene Ca will send all notes to Dr Max Feng

## 2022-02-19 LAB
CHOLESTEROL, TOTAL: 188 MG/DL (ref 0–199)
HDLC SERPL-MCNC: 83 MG/DL (ref 40–60)
LDL CHOLESTEROL CALCULATED: 90 MG/DL
TRIGL SERPL-MCNC: 74 MG/DL (ref 0–150)
VLDLC SERPL CALC-MCNC: 15 MG/DL

## 2022-02-20 ENCOUNTER — FOLLOWUP TELEPHONE ENCOUNTER (OUTPATIENT)
Dept: INPATIENT UNIT | Age: 77
End: 2022-02-20

## 2022-02-20 NOTE — CONSULTS
HF RN consult received from Dr Ryan Weir as part of HF order set. Chart reviewed. Pt presented to ED with c/o chest pain and SOB onset one day prior. Pt has no prior history of HF and no established cardiologist. She takes no cardiac meds. She did have echo June 2020 showing EF 45% gr 2 DD but as noted, is on no cardiac meds. Pt works as a . ProBNP on admission was 244 with Cr 0.5. CXR showed no acute process. Pt continued with c/o dyspnea so was admitted to r/o new onset HF. Pt was treated with IV bolus Lasix for a total of 3 doses. I/O showed + balance and weights appear inaccurate. Awaited echo results since proBNP normal and no acute xray findings. Echo resulted 45% gr 1 DD (essentially unchanged) from 18 months prior. Cardiology was not consulted. HF measures were implemented: daily weights, I/O and sodium restricted diet. HF instructions were added to AVS.  HF education material was attached to AVS including AHA HF interactive workbook link. Follow up was arranged with pt's PCP. HF RN went to speak with pt in setting of new diagnosis but she had already left. HF RN will attempt to speak with pt and provide opportunity to answer any questions when 72 follow up call is made.

## 2022-02-21 NOTE — PROGRESS NOTES
Physician Progress Note      PATIENT:               Donnis Pallas  CSN #:                  [id number]  :                       1945  ADMIT DATE:       2022 5:07 AM  DISCH DATE:        2022 4:51 PM  RESPONDING  PROVIDER #:        Iwona Herrera MD          QUERY TEXT:    Patient admitted with chest pain and shortness of breath, new onset CHF. Documentation reflects patient with hypertension in ED note dated 22. If possible, please document in the progress notes and discharge summary if   hypertension was: The medical record reflects the following:  Risk Factors: 64year old female with CHF  Clinical Indicators: On arrival, /82. 's-170's/60-90's throughout   stay. Treatment: Started lisinopril 5 mg daily. Lasix 40 mg daily. Thank you,  Loan Gilmore RN CDS  Options provided:  -- Hypertension confirmed after study  -- Hypertension ruled out after study  -- Other - I will add my own diagnosis  -- Disagree - Not applicable / Not valid  -- Disagree - Clinically unable to determine / Unknown  -- Refer to Clinical Documentation Reviewer    PROVIDER RESPONSE TEXT:    Hypertension ruled out after study. Query created by:  Yessenia Martin on 2022 9:46 AM      Electronically signed by:  Iwona Herrera MD 2022 9:58 AM

## 2022-02-21 NOTE — PROGRESS NOTES
Attempted to reach patient for 72 hour  hospital follow up. Calls were made at [phone], [phone], and [phone]. Calls went to . HIPAA compliant message left requesting non-urgent return call. Follow up arranged for Th Feb 24 with PCP.

## 2022-03-28 RX ORDER — ESCITALOPRAM OXALATE 10 MG/1
10 TABLET ORAL DAILY
Qty: 90 TABLET | Refills: 3 | Status: SHIPPED | OUTPATIENT
Start: 2022-03-28

## 2022-03-28 RX ORDER — FUROSEMIDE 40 MG/1
40 TABLET ORAL DAILY
Qty: 90 TABLET | Refills: 1 | Status: SHIPPED | OUTPATIENT
Start: 2022-03-28 | End: 2022-10-07

## 2022-03-28 RX ORDER — FUROSEMIDE 40 MG/1
40 TABLET ORAL DAILY
Qty: 30 TABLET | Refills: 0 | Status: SHIPPED | OUTPATIENT
Start: 2022-03-28 | End: 2022-03-28

## 2022-03-28 NOTE — TELEPHONE ENCOUNTER
----- Message from Sekou Augustine sent at 3/28/2022 10:17 AM EDT -----  Subject: Message to Provider    QUESTIONS  Information for Provider? PT CALLED IN STATING THAT HER REFILLS OF HER   MEDS WERE REJECTED PER THE PHARMACY AND SHE ISNT SURE WHY. ALSO HAS A   QUESTION REGARDIG A WATER PILL PRESCRIBED BY DR. Odalis Nobles FROM [facility]  ---------------------------------------------------------------------------  --------------  CALL BACK INFO  What is the best way for the office to contact you? OK to leave message on   voicemail  Preferred Call Back Phone Number? [phone]  ---------------------------------------------------------------------------  --------------  SCRIPT ANSWERS  Relationship to Patient?  Self

## 2022-03-28 NOTE — TELEPHONE ENCOUNTER
Spoke with pt and she was just wanting a refill on the Lasix but did not know how to go about it.      LOV 6/9/20    FOV not scheduled

## 2022-05-10 ENCOUNTER — NURSE ONLY (OUTPATIENT)
Dept: FAMILY MEDICINE CLINIC | Age: 77
End: 2022-05-10
Payer: MEDICARE

## 2022-05-10 ENCOUNTER — TELEPHONE (OUTPATIENT)
Dept: FAMILY MEDICINE CLINIC | Age: 77
End: 2022-05-10

## 2022-05-10 DIAGNOSIS — E53.8 B12 DEFICIENCY: Primary | ICD-10-CM

## 2022-05-10 PROCEDURE — 96372 THER/PROPH/DIAG INJ SC/IM: CPT | Performed by: FAMILY MEDICINE

## 2022-05-10 RX ORDER — UBIDECARENONE 75 MG
100 CAPSULE ORAL DAILY
Qty: 30 TABLET | Refills: 3 | Status: CANCELLED | OUTPATIENT
Start: 2022-05-10 | End: 2023-05-10

## 2022-05-10 RX ORDER — CYANOCOBALAMIN 1000 UG/ML
1000 INJECTION INTRAMUSCULAR; SUBCUTANEOUS ONCE
Status: COMPLETED | OUTPATIENT
Start: 2022-05-10 | End: 2022-05-10

## 2022-05-10 RX ADMIN — CYANOCOBALAMIN 1000 MCG: 1000 INJECTION INTRAMUSCULAR; SUBCUTANEOUS at 13:10

## 2022-05-10 NOTE — TELEPHONE ENCOUNTER
Pt is wanting to go to the B12 pill instead of coming in monthly.  Pt is asking can this be sent to the [address]

## 2022-05-10 NOTE — TELEPHONE ENCOUNTER
Left detailed message for pt that per MD she would not be able to switch over to B12 pill due to her not being able to absorb this. Advised her to call ofc if she had any further questions or concerns.

## 2022-08-04 ENCOUNTER — OFFICE VISIT (OUTPATIENT)
Dept: ENT CLINIC | Age: 77
End: 2022-08-04
Payer: MEDICARE

## 2022-08-04 VITALS
SYSTOLIC BLOOD PRESSURE: 120 MMHG | BODY MASS INDEX: 28.29 KG/M2 | HEART RATE: 116 BPM | HEIGHT: 69 IN | DIASTOLIC BLOOD PRESSURE: 75 MMHG | WEIGHT: 191 LBS

## 2022-08-04 DIAGNOSIS — J30.0 VASOMOTOR RHINITIS: ICD-10-CM

## 2022-08-04 DIAGNOSIS — R42 DIZZINESS: ICD-10-CM

## 2022-08-04 DIAGNOSIS — J30.9 ALLERGIC RHINITIS, UNSPECIFIED SEASONALITY, UNSPECIFIED TRIGGER: Primary | ICD-10-CM

## 2022-08-04 PROCEDURE — 1124F ACP DISCUSS-NO DSCNMKR DOCD: CPT | Performed by: OTOLARYNGOLOGY

## 2022-08-04 PROCEDURE — 99214 OFFICE O/P EST MOD 30 MIN: CPT | Performed by: OTOLARYNGOLOGY

## 2022-08-04 RX ORDER — IPRATROPIUM BROMIDE 42 UG/1
2 SPRAY, METERED NASAL 4 TIMES DAILY
Qty: 15 ML | Refills: 3 | Status: SHIPPED | OUTPATIENT
Start: 2022-08-04 | End: 2022-09-28

## 2022-08-04 NOTE — PROGRESS NOTES
[address] [facility] N[facility]K SURGERY  Follow up      Patient Name: Cleveland Apodaca Helper Record Number:  [id number]  Primary Care Physician:  Ary Pineda MD  Date of Consultation: 8/4/2022    Chief Complaint: Nasal issues, dizziness        Interval History    Patient is following up for her nasal issues and dizziness. I saw her in December 2022 and started her on Flonase and irrigations. Overall she was doing well with this. She was supposed to follow-up sooner, but has had some other medical issues. She said that she is still using the Flonase but still has significant nasal drainage. She does notice it might be worse when she eats. In addition she describes as dizziness. It is a vague dizziness with a feeling of fullness in her head when she closes her eyes. Also having some vague discomfort of the left ear. REVIEW OF SYSTEMS  As above    PHYSICAL EXAM  GENERAL: No Acute Distress, Alert and Oriented, no Hoarseness, strong voice  EYES: EOMI, Anti-icteric  HENT:   Head: Normocephalic and atraumatic. Face:  Symmetric, facial nerve intact, no sinus tenderness  Right Ear: Normal external ear, normal external auditory canal, intact tympanic membrane with normal mobility and aerated middle ear  Left Ear: Normal external ear, stenotic lateral canal, intact tympanic membrane with normal mobility and aerated middle ear  Mouth/Oral Cavity:  normal lips, Uvula is midline, no mucosal lesions, no trismus  Oropharynx/Larynx:  normal oropharynx  Nose:Normal external nasal appearance. NECK: Normal range of motion, no thyromegaly, trachea is midline, no lymphadenopathy, no neck masses, no crepitus            ASSESSMENT/PLAN  1. Allergic rhinitis, unspecified seasonality, unspecified trigger  Continue Flonase    2. Vasomotor rhinitis  Sounds though she may be having more of a vasomotor rhinitis. I am going to add Atrovent nasal spray.   She is no follow-up with things get worse.    3. Dizziness  This is not consistent with the ear pathology. I have performed a head and neck physical exam personally or was physically present during the key or critical portions of the service. This note was generated completely or in part utilizing Dragon dictation speech recognition software. Occasionally, words are mistranscribed and despite editing, the text may contain inaccuracies due to incorrect word recognition. If further clarification is needed please contact the office at [phone].

## 2022-09-20 ENCOUNTER — TELEPHONE (OUTPATIENT)
Dept: FAMILY MEDICINE CLINIC | Age: 77
End: 2022-09-20

## 2022-09-20 ENCOUNTER — HOSPITAL ENCOUNTER (OUTPATIENT)
Dept: ONCOLOGY | Age: 77
Setting detail: INFUSION SERIES
Discharge: HOME | End: 2022-09-20
Payer: MEDICARE

## 2022-09-20 ENCOUNTER — OFFICE VISIT (OUTPATIENT)
Dept: FAMILY MEDICINE CLINIC | Age: 77
End: 2022-09-20
Payer: MEDICARE

## 2022-09-20 VITALS
SYSTOLIC BLOOD PRESSURE: 118 MMHG | OXYGEN SATURATION: 97 % | DIASTOLIC BLOOD PRESSURE: 62 MMHG | RESPIRATION RATE: 16 BRPM | HEART RATE: 73 BPM | TEMPERATURE: 98.5 F

## 2022-09-20 VITALS
DIASTOLIC BLOOD PRESSURE: 68 MMHG | OXYGEN SATURATION: 98 % | SYSTOLIC BLOOD PRESSURE: 100 MMHG | HEART RATE: 81 BPM | BODY MASS INDEX: 25.99 KG/M2 | TEMPERATURE: 97.3 F | WEIGHT: 176 LBS

## 2022-09-20 DIAGNOSIS — I50.22 CHRONIC SYSTOLIC (CONGESTIVE) HEART FAILURE (HCC): ICD-10-CM

## 2022-09-20 DIAGNOSIS — I75.89 ATHEROEMBOLISM OF OTHER SITE (HCC): ICD-10-CM

## 2022-09-20 DIAGNOSIS — D50.9 IRON DEFICIENCY ANEMIA, UNSPECIFIED IRON DEFICIENCY ANEMIA TYPE: ICD-10-CM

## 2022-09-20 DIAGNOSIS — K92.2 GASTROINTESTINAL HEMORRHAGE, UNSPECIFIED GASTROINTESTINAL HEMORRHAGE TYPE: Primary | ICD-10-CM

## 2022-09-20 DIAGNOSIS — D50.9 IRON DEFICIENCY ANEMIA, UNSPECIFIED IRON DEFICIENCY ANEMIA TYPE: Primary | ICD-10-CM

## 2022-09-20 PROBLEM — N30.01 ACUTE CYSTITIS WITH HEMATURIA: Status: RESOLVED | Noted: 2018-05-18 | Resolved: 2022-09-20

## 2022-09-20 LAB
ABO/RH: NORMAL
ANTIBODY SCREEN: NORMAL

## 2022-09-20 PROCEDURE — 36430 TRANSFUSION BLD/BLD COMPNT: CPT

## 2022-09-20 PROCEDURE — P9016 RBC LEUKOCYTES REDUCED: HCPCS

## 2022-09-20 PROCEDURE — 86923 COMPATIBILITY TEST ELECTRIC: CPT

## 2022-09-20 PROCEDURE — 86900 BLOOD TYPING SEROLOGIC ABO: CPT

## 2022-09-20 PROCEDURE — 86850 RBC ANTIBODY SCREEN: CPT

## 2022-09-20 PROCEDURE — 1124F ACP DISCUSS-NO DSCNMKR DOCD: CPT | Performed by: NURSE PRACTITIONER

## 2022-09-20 PROCEDURE — 99215 OFFICE O/P EST HI 40 MIN: CPT | Performed by: NURSE PRACTITIONER

## 2022-09-20 PROCEDURE — 86901 BLOOD TYPING SEROLOGIC RH(D): CPT

## 2022-09-20 RX ORDER — SODIUM CHLORIDE 0.9 % (FLUSH) 0.9 %
5-40 SYRINGE (ML) INJECTION PRN
Status: DISCONTINUED | OUTPATIENT
Start: 2022-09-20 | End: 2022-09-21 | Stop reason: HOSPADM

## 2022-09-20 RX ORDER — SODIUM CHLORIDE 9 MG/ML
20 INJECTION, SOLUTION INTRAVENOUS CONTINUOUS
Status: CANCELLED | OUTPATIENT
Start: 2022-09-20

## 2022-09-20 RX ORDER — ASPIRIN 81 MG/1
81 TABLET ORAL DAILY
COMMUNITY

## 2022-09-20 RX ORDER — DIGOXIN 125 MCG
125 TABLET ORAL DAILY
COMMUNITY

## 2022-09-20 RX ORDER — DIPHENHYDRAMINE HYDROCHLORIDE 50 MG/ML
50 INJECTION INTRAMUSCULAR; INTRAVENOUS
OUTPATIENT
Start: 2022-09-20

## 2022-09-20 RX ORDER — ONDANSETRON 2 MG/ML
8 INJECTION INTRAMUSCULAR; INTRAVENOUS
Status: CANCELLED | OUTPATIENT
Start: 2022-09-20

## 2022-09-20 RX ORDER — ONDANSETRON 2 MG/ML
8 INJECTION INTRAMUSCULAR; INTRAVENOUS
OUTPATIENT
Start: 2022-09-20

## 2022-09-20 RX ORDER — SODIUM CHLORIDE 9 MG/ML
INJECTION, SOLUTION INTRAVENOUS CONTINUOUS
Status: CANCELLED | OUTPATIENT
Start: 2022-09-20

## 2022-09-20 RX ORDER — DIPHENHYDRAMINE HYDROCHLORIDE 50 MG/ML
50 INJECTION INTRAMUSCULAR; INTRAVENOUS
Status: CANCELLED | OUTPATIENT
Start: 2022-09-20

## 2022-09-20 RX ORDER — SODIUM CHLORIDE 9 MG/ML
25 INJECTION, SOLUTION INTRAVENOUS PRN
Status: CANCELLED | OUTPATIENT
Start: 2022-09-20

## 2022-09-20 RX ORDER — SODIUM CHLORIDE 0.9 % (FLUSH) 0.9 %
5-40 SYRINGE (ML) INJECTION PRN
Status: CANCELLED | OUTPATIENT
Start: 2022-09-20

## 2022-09-20 RX ORDER — CYANOCOBALAMIN 1000 UG/ML
1000 INJECTION INTRAMUSCULAR; SUBCUTANEOUS ONCE
COMMUNITY
End: 2022-10-13

## 2022-09-20 RX ORDER — SODIUM CHLORIDE 9 MG/ML
25 INJECTION, SOLUTION INTRAVENOUS PRN
Status: DISCONTINUED | OUTPATIENT
Start: 2022-09-20 | End: 2022-09-21 | Stop reason: HOSPADM

## 2022-09-20 RX ORDER — ACETAMINOPHEN 325 MG/1
650 TABLET ORAL
OUTPATIENT
Start: 2022-09-20

## 2022-09-20 RX ORDER — ACETAMINOPHEN 325 MG/1
650 TABLET ORAL
Status: CANCELLED | OUTPATIENT
Start: 2022-09-20

## 2022-09-20 RX ORDER — ALBUTEROL SULFATE 90 UG/1
4 AEROSOL, METERED RESPIRATORY (INHALATION) PRN
Status: CANCELLED | OUTPATIENT
Start: 2022-09-20

## 2022-09-20 RX ORDER — SODIUM CHLORIDE 9 MG/ML
20 INJECTION, SOLUTION INTRAVENOUS CONTINUOUS
Status: DISCONTINUED | OUTPATIENT
Start: 2022-09-20 | End: 2022-09-21 | Stop reason: HOSPADM

## 2022-09-20 RX ORDER — MIDODRINE HYDROCHLORIDE 2.5 MG/1
5 TABLET ORAL 2 TIMES DAILY
COMMUNITY

## 2022-09-20 RX ORDER — SODIUM CHLORIDE 9 MG/ML
INJECTION, SOLUTION INTRAVENOUS CONTINUOUS
OUTPATIENT
Start: 2022-09-20

## 2022-09-20 RX ORDER — ALBUTEROL SULFATE 90 UG/1
4 AEROSOL, METERED RESPIRATORY (INHALATION) PRN
OUTPATIENT
Start: 2022-09-20

## 2022-09-20 ASSESSMENT — PATIENT HEALTH QUESTIONNAIRE - PHQ9
8. MOVING OR SPEAKING SO SLOWLY THAT OTHER PEOPLE COULD HAVE NOTICED. OR THE OPPOSITE, BEING SO FIGETY OR RESTLESS THAT YOU HAVE BEEN MOVING AROUND A LOT MORE THAN USUAL: 0
6. FEELING BAD ABOUT YOURSELF - OR THAT YOU ARE A FAILURE OR HAVE LET YOURSELF OR YOUR FAMILY DOWN: 0
4. FEELING TIRED OR HAVING LITTLE ENERGY: 2
7. TROUBLE CONCENTRATING ON THINGS, SUCH AS READING THE NEWSPAPER OR WATCHING TELEVISION: 0
2. FEELING DOWN, DEPRESSED OR HOPELESS: 0
9. THOUGHTS THAT YOU WOULD BE BETTER OFF DEAD, OR OF HURTING YOURSELF: 0
10. IF YOU CHECKED OFF ANY PROBLEMS, HOW DIFFICULT HAVE THESE PROBLEMS MADE IT FOR YOU TO DO YOUR WORK, TAKE CARE OF THINGS AT HOME, OR GET ALONG WITH OTHER PEOPLE: 1
SUM OF ALL RESPONSES TO PHQ QUESTIONS 1-9: 2
1. LITTLE INTEREST OR PLEASURE IN DOING THINGS: 0
3. TROUBLE FALLING OR STAYING ASLEEP: 0
SUM OF ALL RESPONSES TO PHQ9 QUESTIONS 1 & 2: 0
5. POOR APPETITE OR OVEREATING: 0

## 2022-09-20 ASSESSMENT — ENCOUNTER SYMPTOMS
CHEST TIGHTNESS: 0
SHORTNESS OF BREATH: 1
GASTROINTESTINAL NEGATIVE: 1
STRIDOR: 0
CHOKING: 0
COUGH: 0

## 2022-09-20 NOTE — ASSESSMENT & PLAN NOTE
Hgb-6.7 per [facility]  To go to infusion clinic for 2 units today  Feels well enough to drive over, declines ED  To see GI on Thursday.  Dr. Boateng Grounds office is setting up appt thursday

## 2022-09-20 NOTE — TELEPHONE ENCOUNTER
----- Message from Hi Davila sent at 9/20/2022 11:02 AM EDT -----  Subject: Message to Provider    QUESTIONS  Information for Provider? Patient had blood work done through the   cardiologist and it came back very anemic and was told to call pcp and see   what to do next? call her back   ---------------------------------------------------------------------------  --------------  [phone]  [phone]; OK to leave message on voicemail  ---------------------------------------------------------------------------  --------------  SCRIPT ANSWERS  Relationship to Patient?  Self

## 2022-09-20 NOTE — PROGRESS NOTES
Respiratory:  Positive for shortness of breath. Negative for cough, choking, chest tightness and stridor. Cardiovascular:  Negative for chest pain, palpitations and leg swelling. Gastrointestinal: Negative. Genitourinary: Negative. Skin:  Positive for pallor. Neurological:  Negative for dizziness and light-headedness. Vitals:    09/20/22 1209   BP: 100/68   Site: Left Upper Arm   Position: Sitting   Cuff Size: Medium Adult   Pulse: 81   Temp: 97.3 °F (36.3 °C)   SpO2: 98%   Weight: 176 lb (79.8 kg)       Physical Exam  Constitutional:       Appearance: Normal appearance. She is well-developed and normal weight. HENT:      Head: Normocephalic. Right Ear: Tympanic membrane normal.      Left Ear: Tympanic membrane normal.      Mouth/Throat:      Mouth: Mucous membranes are moist.   Eyes:      Pupils: Pupils are equal, round, and reactive to light. Cardiovascular:      Rate and Rhythm: Normal rate and regular rhythm. Heart sounds: Normal heart sounds. Pulmonary:      Effort: Pulmonary effort is normal.      Breath sounds: Normal breath sounds. Musculoskeletal:         General: Normal range of motion. Cervical back: Normal range of motion. Skin:     General: Skin is warm and dry. Coloration: Skin is pale. Neurological:      Mental Status: She is alert and oriented to person, place, and time. An electronic signature was used to authenticate this note.     --SAURAV Nelson - CNP

## 2022-09-20 NOTE — PROGRESS NOTES
Patient's hemoglobin this AM: 6.7. Pt seen at 0 [facility] today for  Packed red blood cell transfusion  for above lab values. Informed consent verified. No Pre-medications ordered with no previous transfusion reaction history. Transfused per policy. Pt tolerated transfusion well and without incident. Pt verbalizes understanding of discharge instructions. Discharged to home with daughter.    Gregory Richardson RN

## 2022-09-21 ENCOUNTER — HOSPITAL ENCOUNTER (OUTPATIENT)
Dept: ONCOLOGY | Age: 77
Setting detail: INFUSION SERIES
Discharge: HOME | End: 2022-09-21
Payer: MEDICARE

## 2022-09-21 VITALS
RESPIRATION RATE: 16 BRPM | OXYGEN SATURATION: 96 % | DIASTOLIC BLOOD PRESSURE: 68 MMHG | TEMPERATURE: 97.9 F | SYSTOLIC BLOOD PRESSURE: 123 MMHG | HEART RATE: 72 BPM

## 2022-09-21 LAB
BLOOD BANK DISPENSE STATUS: NORMAL
BLOOD BANK DISPENSE STATUS: NORMAL
BLOOD BANK PRODUCT CODE: NORMAL
BLOOD BANK PRODUCT CODE: NORMAL
BPU ID: NORMAL
BPU ID: NORMAL
DESCRIPTION BLOOD BANK: NORMAL
DESCRIPTION BLOOD BANK: NORMAL

## 2022-09-21 PROCEDURE — P9016 RBC LEUKOCYTES REDUCED: HCPCS

## 2022-09-21 PROCEDURE — 36430 TRANSFUSION BLD/BLD COMPNT: CPT

## 2022-09-21 RX ORDER — DIPHENHYDRAMINE HYDROCHLORIDE 50 MG/ML
50 INJECTION INTRAMUSCULAR; INTRAVENOUS
OUTPATIENT
Start: 2022-09-21

## 2022-09-21 RX ORDER — SODIUM CHLORIDE 9 MG/ML
INJECTION, SOLUTION INTRAVENOUS CONTINUOUS
OUTPATIENT
Start: 2022-09-21

## 2022-09-21 RX ORDER — ACETAMINOPHEN 325 MG/1
650 TABLET ORAL
OUTPATIENT
Start: 2022-09-21

## 2022-09-21 RX ORDER — ONDANSETRON 2 MG/ML
8 INJECTION INTRAMUSCULAR; INTRAVENOUS
OUTPATIENT
Start: 2022-09-21

## 2022-09-21 RX ORDER — ALBUTEROL SULFATE 90 UG/1
4 AEROSOL, METERED RESPIRATORY (INHALATION) PRN
OUTPATIENT
Start: 2022-09-21

## 2022-09-21 NOTE — PROGRESS NOTES
Patient's hemoglobin this AM: 6.7. Pt seen at 0 [facility] today for  Packed red blood cell transfusion  for above lab values. Informed consent verified. No Pre-medications ordered with no previous transfusion reaction history. Transfused per policy. Pt tolerated transfusion well and without incident. Pt verbalizes understanding of discharge instructions. Discharged to home with daughter.    Ryan Oshea RN

## 2022-09-26 ENCOUNTER — TELEPHONE (OUTPATIENT)
Dept: FAMILY MEDICINE CLINIC | Age: 77
End: 2022-09-26

## 2022-09-26 DIAGNOSIS — D50.9 IRON DEFICIENCY ANEMIA, UNSPECIFIED IRON DEFICIENCY ANEMIA TYPE: Primary | ICD-10-CM

## 2022-09-27 DIAGNOSIS — D50.9 IRON DEFICIENCY ANEMIA, UNSPECIFIED IRON DEFICIENCY ANEMIA TYPE: ICD-10-CM

## 2022-09-27 LAB
HCT VFR BLD CALC: 31 % (ref 36–48)
HEMOGLOBIN: 9.4 G/DL (ref 12–16)
MCH RBC QN AUTO: 21.4 PG (ref 26–34)
MCHC RBC AUTO-ENTMCNC: 30.3 G/DL (ref 31–36)
MCV RBC AUTO: 70.5 FL (ref 80–100)
PDW BLD-RTO: 25.2 % (ref 12.4–15.4)
PLATELET # BLD: 391 K/UL (ref 135–450)
PMV BLD AUTO: 6.9 FL (ref 5–10.5)
RBC # BLD: 4.4 M/UL (ref 4–5.2)
WBC # BLD: 4 K/UL (ref 4–11)

## 2022-09-28 ENCOUNTER — TELEPHONE (OUTPATIENT)
Dept: FAMILY MEDICINE CLINIC | Age: 77
End: 2022-09-28

## 2022-09-28 RX ORDER — IPRATROPIUM BROMIDE 42 UG/1
SPRAY, METERED NASAL
Qty: 15 ML | Refills: 3 | Status: SHIPPED | OUTPATIENT
Start: 2022-09-28

## 2022-10-03 DIAGNOSIS — D50.9 IRON DEFICIENCY ANEMIA, UNSPECIFIED IRON DEFICIENCY ANEMIA TYPE: ICD-10-CM

## 2022-10-03 LAB
HCT VFR BLD CALC: 31.1 % (ref 36–48)
HEMOGLOBIN: 9.7 G/DL (ref 12–16)
MCH RBC QN AUTO: 21.9 PG (ref 26–34)
MCHC RBC AUTO-ENTMCNC: 31 G/DL (ref 31–36)
MCV RBC AUTO: 70.7 FL (ref 80–100)
PDW BLD-RTO: 28.2 % (ref 12.4–15.4)
PLATELET # BLD: 329 K/UL (ref 135–450)
PMV BLD AUTO: 7 FL (ref 5–10.5)
RBC # BLD: 4.4 M/UL (ref 4–5.2)
WBC # BLD: 5.3 K/UL (ref 4–11)

## 2022-10-07 RX ORDER — FUROSEMIDE 40 MG/1
40 TABLET ORAL DAILY
Qty: 90 TABLET | Refills: 1 | Status: SHIPPED | OUTPATIENT
Start: 2022-10-07 | End: 2023-01-05

## 2022-10-10 ENCOUNTER — ANESTHESIA EVENT (OUTPATIENT)
Dept: ENDOSCOPY | Age: 77
End: 2022-10-10
Payer: MEDICARE

## 2022-10-11 ENCOUNTER — ANESTHESIA (OUTPATIENT)
Dept: ENDOSCOPY | Age: 77
End: 2022-10-11
Payer: MEDICARE

## 2022-10-11 ENCOUNTER — HOSPITAL ENCOUNTER (OUTPATIENT)
Age: 77
Setting detail: OUTPATIENT SURGERY
Discharge: HOME | End: 2022-10-11
Attending: INTERNAL MEDICINE | Admitting: INTERNAL MEDICINE
Payer: MEDICARE

## 2022-10-11 VITALS
TEMPERATURE: 96.9 F | OXYGEN SATURATION: 98 % | SYSTOLIC BLOOD PRESSURE: 133 MMHG | RESPIRATION RATE: 16 BRPM | BODY MASS INDEX: 28.61 KG/M2 | WEIGHT: 178 LBS | HEIGHT: 66 IN | DIASTOLIC BLOOD PRESSURE: 69 MMHG | HEART RATE: 65 BPM

## 2022-10-11 DIAGNOSIS — K56.699 COLON STRICTURE (HCC): Primary | ICD-10-CM

## 2022-10-11 DIAGNOSIS — D50.9 IRON DEFICIENCY ANEMIA, UNSPECIFIED IRON DEFICIENCY ANEMIA TYPE: ICD-10-CM

## 2022-10-11 PROCEDURE — 2709999900 HC NON-CHARGEABLE SUPPLY: Performed by: INTERNAL MEDICINE

## 2022-10-11 PROCEDURE — 7100000010 HC PHASE II RECOVERY - FIRST 15 MIN: Performed by: INTERNAL MEDICINE

## 2022-10-11 PROCEDURE — 3609019800 HC COLONOSCOPY WITH SUBMUCOSAL INJECTION: Performed by: INTERNAL MEDICINE

## 2022-10-11 PROCEDURE — 3700000001 HC ADD 15 MINUTES (ANESTHESIA): Performed by: INTERNAL MEDICINE

## 2022-10-11 PROCEDURE — 88305 TISSUE EXAM BY PATHOLOGIST: CPT

## 2022-10-11 PROCEDURE — 2580000003 HC RX 258: Performed by: NURSE ANESTHETIST, CERTIFIED REGISTERED

## 2022-10-11 PROCEDURE — 2580000003 HC RX 258: Performed by: ANESTHESIOLOGY

## 2022-10-11 PROCEDURE — 6360000002 HC RX W HCPCS: Performed by: NURSE ANESTHETIST, CERTIFIED REGISTERED

## 2022-10-11 PROCEDURE — 7100000011 HC PHASE II RECOVERY - ADDTL 15 MIN: Performed by: INTERNAL MEDICINE

## 2022-10-11 PROCEDURE — 3609012400 HC EGD TRANSORAL BIOPSY SINGLE/MULTIPLE: Performed by: INTERNAL MEDICINE

## 2022-10-11 PROCEDURE — 3609010300 HC COLONOSCOPY W/BIOPSY SINGLE/MULTIPLE: Performed by: INTERNAL MEDICINE

## 2022-10-11 PROCEDURE — 3700000000 HC ANESTHESIA ATTENDED CARE: Performed by: INTERNAL MEDICINE

## 2022-10-11 RX ORDER — PANTOPRAZOLE SODIUM 40 MG/1
40 TABLET, DELAYED RELEASE ORAL
Qty: 30 TABLET | Refills: 5 | Status: SHIPPED | OUTPATIENT
Start: 2022-10-11 | End: 2022-10-19 | Stop reason: ALTCHOICE

## 2022-10-11 RX ORDER — PROPOFOL 10 MG/ML
INJECTION, EMULSION INTRAVENOUS PRN
Status: DISCONTINUED | OUTPATIENT
Start: 2022-10-11 | End: 2022-10-11 | Stop reason: SDUPTHER

## 2022-10-11 RX ORDER — SODIUM CHLORIDE, SODIUM LACTATE, POTASSIUM CHLORIDE, CALCIUM CHLORIDE 600; 310; 30; 20 MG/100ML; MG/100ML; MG/100ML; MG/100ML
INJECTION, SOLUTION INTRAVENOUS CONTINUOUS
Status: DISCONTINUED | OUTPATIENT
Start: 2022-10-11 | End: 2022-10-11 | Stop reason: HOSPADM

## 2022-10-11 RX ORDER — SODIUM CHLORIDE, SODIUM LACTATE, POTASSIUM CHLORIDE, CALCIUM CHLORIDE 600; 310; 30; 20 MG/100ML; MG/100ML; MG/100ML; MG/100ML
INJECTION, SOLUTION INTRAVENOUS CONTINUOUS PRN
Status: DISCONTINUED | OUTPATIENT
Start: 2022-10-11 | End: 2022-10-11 | Stop reason: SDUPTHER

## 2022-10-11 RX ORDER — PROPOFOL 10 MG/ML
INJECTION, EMULSION INTRAVENOUS CONTINUOUS PRN
Status: DISCONTINUED | OUTPATIENT
Start: 2022-10-11 | End: 2022-10-11 | Stop reason: SDUPTHER

## 2022-10-11 RX ORDER — LIDOCAINE HYDROCHLORIDE 20 MG/ML
INJECTION, SOLUTION INTRAVENOUS PRN
Status: DISCONTINUED | OUTPATIENT
Start: 2022-10-11 | End: 2022-10-11 | Stop reason: SDUPTHER

## 2022-10-11 RX ADMIN — LIDOCAINE HYDROCHLORIDE 100 MG: 20 INJECTION, SOLUTION INTRAVENOUS at 08:30

## 2022-10-11 RX ADMIN — PROPOFOL 150 MCG/KG/MIN: 10 INJECTION, EMULSION INTRAVENOUS at 08:30

## 2022-10-11 RX ADMIN — PROPOFOL 50 MG: 10 INJECTION, EMULSION INTRAVENOUS at 08:30

## 2022-10-11 RX ADMIN — SODIUM CHLORIDE, SODIUM LACTATE, POTASSIUM CHLORIDE, AND CALCIUM CHLORIDE: .6; .31; .03; .02 INJECTION, SOLUTION INTRAVENOUS at 08:26

## 2022-10-11 RX ADMIN — SODIUM CHLORIDE, POTASSIUM CHLORIDE, SODIUM LACTATE AND CALCIUM CHLORIDE: 600; 310; 30; 20 INJECTION, SOLUTION INTRAVENOUS at 07:24

## 2022-10-11 RX ADMIN — PHENYLEPHRINE HYDROCHLORIDE 100 MCG: 10 INJECTION, SOLUTION INTRAMUSCULAR; INTRAVENOUS; SUBCUTANEOUS at 09:05

## 2022-10-11 ASSESSMENT — PAIN - FUNCTIONAL ASSESSMENT
PAIN_FUNCTIONAL_ASSESSMENT: 0-10
PAIN_FUNCTIONAL_ASSESSMENT: NONE - DENIES PAIN

## 2022-10-11 ASSESSMENT — PAIN SCALES - GENERAL
PAINLEVEL_OUTOF10: 0

## 2022-10-11 ASSESSMENT — ENCOUNTER SYMPTOMS: SHORTNESS OF BREATH: 1

## 2022-10-11 ASSESSMENT — LIFESTYLE VARIABLES: SMOKING_STATUS: 0

## 2022-10-11 NOTE — DISCHARGE INSTRUCTIONS
ENDOSCOPY DISCHARGE INSTRUCTIONS:    Call the physician that did your procedure for any questions or concern:    [facility]: [phone]  DR. PINZON [facility]        ACTIVITY:    There are potential side effects to the medications used for sedation and anesthesia during your procedure. These include:  Dizziness or light-headedness, confusion or memory loss, delayed reaction times, loss of coordination, nausea and vomiting. Because of your increased risk for injury, we ask that you observe the following precautions: For the next 24 hours,  DO NOT operate an automobile, bicycle, motorcycle, , power tools or large equipment of any kind. Do not drink alcohol, sign any legal documents or make any legal decisions for 24 hours. Do not bend your head over lower than your heart. DO sit on the side of bed/couch awhile before getting up. Plan on bedrest or quiet relaxation today. You may resume normal activities in 24 hours. DIET:    Your first meal today should be light, avoiding spicy and fatty foods. If you tolerate this first meal, then you may advance to your regular diet unless otherwise advised by your physician. NORMAL SYMPTOMS:  -Mild sore throat if youve had an EGD   -Gaseous discomfort    NOTIFY YOUR PHYSICIAN IF THESE SYMPTOMS OCCUR:  1. Fever (greater than 100)  5. Increased abdominal bloating  2. Severe pain    6. Excessive bleeding  3. Nausea and vomiting  7. Chest pain                                                                    4. Chills    8. Shortness of breath    ADDITIONAL INSTRUCTIONS:    Biopsy results: Call [address],[facility] for biopsy results in 1 week    Educational Information:             Celiac Disease: Care Instructions  Your Care Instructions  Celiac disease (or celiac sprue) is a problem with digesting gluten. Gluten is a type of protein found in wheat, rye, and other grains.  This problem starts when the body's immune system attacks the small intestine when gluten is eaten. The immune system is supposed to fight off viruses and other invaders, but sometimes it turns on the person's own body. (This is called an autoimmune disease.) Celiac disease seems to run in families. Celiac disease causes damage to the small intestine. This makes it hard for the body to absorb vitamins and other nutrients. You cannot prevent celiac disease. But you can stop and reverse the damage to the small intestine by eating a strict gluten-free diet. Follow-up care is a key part of your treatment and safety. Be sure to make and go to all appointments, and call your doctor if you are having problems. It's also a good idea to know your test results and keep a list of the medicines you take. How can you care for yourself at home? Eat a gluten-free diet to prevent symptoms and damage to the small intestine. Even a small amount of gluten may cause damage. Avoid all foods that contain wheat, rye, and barley. Foods that are often made with these grains include bread, bagels, pasta, pizza, malted breakfast cereals, and crackers. Avoid oats, at least at first. Oats may cause symptoms in some people. The oats may be contaminated with wheat, barley, or rye from processing. But many people who have celiac disease can eat moderate amounts of oats without having symptoms. Health professionals vary in their long-term recommendations regarding eating foods with oats. But most agree it is safe to eat oats labeled as gluten-free. You may need to avoid milk and milk products for a while. Once you stop eating any gluten, the intestine will begin to heal. Then it should be okay to drink milk and eat milk products. Read food labels carefully and look for hidden gluten, such as gluten in medicine and some food additives. If a label says \"modified food starch,\" the product may contain gluten. Plan your diet around:  Eggs. Dairy products, if you can eat them.  Cheese, yogurt, and other dairy products can be an important part of the diet. Flours and foods made with amaranth, arrowroot, beans, buckwheat, corn, cornmeal, flax, millet, potatoes, gluten-free oat bran, quinoa, rice, sorghum, soybeans, tapioca, or teff. Fresh, frozen, and canned meats, fruits, and vegetables. Watch for added gluten. Talk to your doctor or contact your local hospital or dietitian for information about support groups in your area. You may find a support group helpful for discovering ways to help you deal with celiac disease. Celiac disease support groups often share recipes and good food sources. Look for gluten-free foods. Many food stores, especially health food stores, offer specially marked gluten-free food. When should you call for help? Watch closely for changes in your health, and be sure to contact your doctor if:    Your bloating, gas, and diarrhea get worse. You have bloating, gas, and diarrhea after not having them for a while. Where can you learn more? Go to https://[employer] - how to [place]. org and sign in to your [employer] account. Enter [id number] in the [facility] box to learn more about \"Celiac Disease: Care Instructions. \"     If you do not have an account, please click on the \"Sign Up Now\" link. Current as of: June 6, 2022               Content Version: 13.4  © 8292-3034 [employer]wise, Inc[employer]. Care instructions adapted under license by [facility] ([facility]). If you have questions about a medical condition or this instruction, always ask your healthcare professional. [address] any warranty or liability for your use of this information. Please review these discharge instructions this evening or tomorrow for  information you may have forgotten. We want to thank you for choosing the [facility] as your health care provider. We always strive to provide you with excellent care while you are here. You may receive a survey in the mail regarding your care.  We would appreciate you taking a few minutes of your time to complete this survey.

## 2022-10-11 NOTE — PROGRESS NOTES
Pre-op and post-op explained to patient and expectations reviewed with patient. Patient verbalizes understanding. No additional questions at this time.

## 2022-10-11 NOTE — ANESTHESIA PRE PROCEDURE
Department of Anesthesiology  Preprocedure Note       Name:  Miranda Kaplan   Age:  76 y.o.  :  1945                                          MRN:  [id number]         Date:  10/11/2022      Surgeon: Edith Glaser):  Wendy Bobo MD    Procedure: ESOPHAGOGASTRODUODENOSCOPY  COLONOSCOPY    Medications prior to admission:   Prior to Admission medications    Medication Sig Start Date End Date Taking? Authorizing Provider   furosemide (LASIX) 40 MG tablet TAKE 1 TABLET BY MOUTH DAILY 10/7/22 1/5/23  Leida Acosta, APRN - CNP   ipratropium (ATROVENT) 0.06 % nasal spray USE 2 SPRAYS IN EACH NOSTRIL IN THE MORNING, 2 SPRAYS AT NOON, 2 SPRAYS IN THE EVENING, AND 2 SPRAYS BEFORE BEDTIME 22   Sampson Schultz MD   midodrine (PROAMATINE) 2.5 MG tablet Take 5 mg by mouth in the morning and at bedtime    Historical Provider, MD   aspirin 81 MG EC tablet Take 81 mg by mouth daily    Historical Provider, MD   digoxin (LANOXIN) 125 MCG tablet Take 125 mcg by mouth daily    Historical Provider, MD   cyanocobalamin 1000 MCG/ML injection Inject 1,000 mcg into the muscle once  Patient not taking: Reported on 10/11/2022    Historical Provider, MD   escitalopram (LEXAPRO) 10 MG tablet Take 1 tablet by mouth daily 3/28/22   Amada Gomez MD   fluticasone Phan Burk) 50 MCG/ACT nasal spray 1 spray by Each Nostril route 2 times daily 21   Sampson Schultz MD       Current medications:    No current facility-administered medications for this visit. No current outpatient medications on file. Facility-Administered Medications Ordered in Other Visits   Medication Dose Route Frequency Provider Last Rate Last Admin    lactated ringers infusion   IntraVENous Continuous Carley Gan,  mL/hr at 10/11/22 0724 New Bag at 10/11/22 0724       Allergies: Allergies   Allergen Reactions    Seasonal        Problem List:    Patient Active Problem List   Diagnosis Code    Depression F32. A    Anxiety F41.9    S/P gastric bypass Z98.84    Iron deficiency anemia D50.9    GERD (gastroesophageal reflux disease) K21.9    Fatigue R53.83    Lower back pain M54.50    Colon polyp K63.5    Ex-smoker Z87.891    Urinary frequency R35.0    Stenosis of left external auditory canal H61.302    Stress fracture of cervical vertebra with delayed healing M48. 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    Chronic systolic (congestive) heart failure I50.22    Gastrointestinal hemorrhage K92.2       Past Medical History:        Diagnosis Date    A-fib [facility])     Age-related osteoporosis without current pathological fracture 2019    Anxiety     Colon polyp     Depression     Hypoglycemia     Vitamin B12 deficiency        Past Surgical History:        Procedure Laterality Date    APPENDECTOMY      CERVICAL FUSION N/A 2019    C1-2 FUSION POSTERIOR performed by Rocio Rodriguez MD at [address]    COLONOSCOPY  2012    q 5    GASTRIC BYPASS SURGERY      HYSTERECTOMY (CERVIX STATUS UNKNOWN)      TONSILLECTOMY         Social History:    Social History     Tobacco Use    Smoking status: Former     Packs/day: 1.00     Years: 30.00     Pack years: 30.00     Types: Cigarettes     Start date: 10/10/1974     Quit date: 10/10/2004     Years since quittin.0    Smokeless tobacco: Never    Tobacco comments:     15 years ago   Substance Use Topics    Alcohol use: Yes     Alcohol/week: 0.0 standard drinks     Comment: occ                                Counseling given: Not Answered  Tobacco comments: 15 years ago      Vital Signs (Current): There were no vitals filed for this visit.                                            BP Readings from Last 3 limited  Mouth opening: > = 3 FB  C-spine cleared Dental:    (+) upper dentures, lower dentures and partials      Pulmonary: breath sounds clear to auscultation  (+) shortness of breath: no interval change,      (-) COPD, asthma, sleep apnea and not a current smoker                           Cardiovascular:  Exercise tolerance: good (>4 METS),   (+) CHF: systolic and diastolic, CRAVEN:,     (-) hypertension, past MI, CAD, CABG/stent, dysrhythmias,  angina, orthopnea, PND and no hyperlipidemia    ECG reviewed  Rhythm: regular  Rate: normal  Echocardiogram reviewed         Beta Blocker:  Not on Beta Blocker      ROS comment: 2016:  Baseline resting echocardiogram shows normal global LV systolic function   with an ejection fraction of 55% and uniform myocardial segmental wall   motion. Following stress there was uniform augmentation of all myocardial   segments with appropriate hyperdynamic LV systolic response to stress with   an ejection fraction of 60%. Only the apical four chamber view was acquired  [place] target. Neuro/Psych:   (+) psychiatric history: stable with treatmentdepression/anxiety    (-) seizures, TIA and CVA            ROS comment: Lumbago  Cervical vertebra fracture GI/Hepatic/Renal:   (+) GERD:, bowel prep,      (-) hepatitis and liver disease      ROS comment: S/p gastric bypass  Colon polyp. Endo/Other:    (+) blood dyscrasia::., no malignancy/cancer. (-) diabetes mellitus, no electrolyte abnormalities, no malignancy/cancer          C-spine cleared           Abdominal:   (+) obese,     Abdomen: soft. Vascular:     - DVT and PE. Other Findings:             Anesthesia Plan      MAC     ASA 3       Induction: intravenous. Anesthetic plan and risks discussed with patient. Plan discussed with CRNA.     Attending anesthesiologist reviewed and agrees with Preprocedure content                Kalpesh Yao DO   10/11/2022

## 2022-10-11 NOTE — ANESTHESIA POSTPROCEDURE EVALUATION
Department of Anesthesiology  Postprocedure Note    Patient: Paula Shaw  MRN: [id number]  YOB: 1945  Date of evaluation: 10/11/2022      Procedure Summary     Date: 10/11/22 Room / Location: [facility]    Anesthesia Start: 3271 Anesthesia Stop: 2682    Procedures:       EGD BIOPSY      COLONOSCOPY WITH BIOPSY      COLONOSCOPY SUBMUCOSAL/spot  INJECTION Diagnosis:       Iron deficiency anemia, unspecified iron deficiency anemia type      (Iron deficiency anemia, unspecified iron deficiency anemia type [D50.9])    Surgeons: Jennifer Wakefield MD Responsible Provider: Daphne Donnelly DO    Anesthesia Type: MAC ASA Status: 3          Anesthesia Type: No value filed.     Anthony Phase I: Anthony Score: 10    Anthony Phase II: Anthony Score: 10      Anesthesia Post Evaluation    Patient location during evaluation: PACU  Patient participation: complete - patient participated  Level of consciousness: awake  Pain score: 0  Airway patency: patent  Nausea & Vomiting: no nausea and no vomiting  Complications: no  Cardiovascular status: hemodynamically stable  Respiratory status: acceptable  Hydration status: stable

## 2022-10-11 NOTE — PROCEDURES
EGD/COLONOSCOPY PROCEDURE NOTE:        Patient: Princess Moreno  :   Acct#:     Procedure:   Esophagogastroduodenoscopy with biopsy  Colonoscopy with biopsy, injection/tattoo        Date:  10/11/2022     Surgeon:  Sarah Lee MD,     Referring Physician:  Meli Whitfield MD      Preoperative Diagnosis:    Iron deficiency anemia      Anesthesia:    MAC anesthesia      Consent:  The patient or their legal guardian has signed an informed consent, and is aware of the potential risks, benefits, alternatives, and potential complications of this procedure. These include, but are not limited to hemorrhage, bleeding, post procedural pain, perforation, phlebitis, aspiration, hypotension, hypoxia, cardiovascular events such as arryhthmia, and possibly death. EGD PROCEDURE NOTE      Description of Procedure: The patient was then taken to the endoscopy suite, placed in the left lateral decubitus position and the above IV sedation was administrered. The Olympus video endoscope was placed through the patient's oropharynx without difficulty to the extent of the 2nd portion of the duodenum. The patient tolerated the procedure well and was taken to the post anesthesia care unit in good condition. Complications: None  EBL: none      Findings:  Esophagus:  Visualization of the esophagus demonstrated normal mucosa. GE junction at 38 cm. Stomach: Findings consistent with Garrett-en-Y gastric bypass seen. Small erosions seen near the gastrojejunal anastomotic site, biopsies were obtained. Jejunum. The afferent and efferent jejunum appeared unremarkable, biopsies were obtained   the scope was then withdrawn back into the stomach, it was decompressed, and the scope was completely withdrawn. Impression:    Findings consistent with history of Garrett-en-Y gastric bypass surgery  Small erosions seen near gastrojejunal anastomotic site, biopsies were obtained.               COLONOSCOPY PROCEDURE NOTE      Procedure description:   An informed consent was obtained from the patient after explanation of indications, benefits, possible risks and complications of the procedure. The patient was then taken to the endoscopy suite, placed in the left lateral decubitus position, and the above IV anesthesia was administered. A digital rectal examination was performed and revealed negative without mass, lesions or tenderness. The Olympus video pediatric colonoscope was placed in the patient's rectum under digital direction and advanced to the distal transverse/proximal descending colon at 45 cm. The scope could not be advanced further because of stricture. Procedure also attempted with gastroscope and still could not be advanced beyond it. The scope was then withdrawn back through the  descending and sigmoid colons. Careful circumferential examination of the mucosa was performed. The scope was then withdrawn into the rectum and retroflexed. The scope was straightened, the colon was decompressed and the scope was withdrawn from the patient. The patient tolerated the procedure well and was taken to the recovery room in good condition. Complications: none  EBL: None    Findings:  The pediatric colonoscope could not be advanced beyond 45 cm, proximal descending/distal transverse colon due to tight stricture. Procedure was then attempted with gastroscope which also could not be advanced beyond 45 cm due to tight stricture. Multiple biopsies were obtained to rule out malignancy. Distal end of the stricture was tattooed with [place] ink. Mild left-sided diverticulosis seen. Retroflexion the rectum revealed small internal hemorrhoids. Impression:    Severe stricture seen at 45 cm, proximal descending colon/distal transverse colon, precluding advancement of pediatric colonoscope and gastroscope as described above. Biopsies obtained to rule out malignancy.   [place] ink tattoo performed

## 2022-10-11 NOTE — H&P
Pre-operative History and Physical    Patient: Loulou Farfan  :   Acct#:     History Obtained From:  patient    HISTORY OF PRESENT ILLNESS:    The patient is a 76 y.o. female  who presents with fe def anemia    Past Medical History:        Diagnosis Date    A-fib ([address]     Age-related osteoporosis without current pathological fracture 2019    Anxiety     Colon polyp     Depression     Hypoglycemia     Vitamin B12 deficiency      Past Surgical History:        Procedure Laterality Date    APPENDECTOMY      CERVICAL FUSION N/A 2019    C1-2 FUSION POSTERIOR performed by David Greenwood. Pam Torres MD at [facility]  2012    q 5    GASTRIC BYPASS SURGERY      HYSTERECTOMY (CERVIX STATUS UNKNOWN)      TONSILLECTOMY       Medications Prior to Admission:   No current facility-administered medications on file prior to encounter.      Current Outpatient Medications on File Prior to Encounter   Medication Sig Dispense Refill    ipratropium (ATROVENT) 0.06 % nasal spray USE 2 SPRAYS IN EACH NOSTRIL IN THE MORNING, 2 SPRAYS AT NOON, 2 SPRAYS IN THE EVENING, AND 2 SPRAYS BEFORE BEDTIME 15 mL 3    midodrine (PROAMATINE) 2.5 MG tablet Take 5 mg by mouth in the morning and at bedtime      aspirin 81 MG EC tablet Take 81 mg by mouth daily      digoxin (LANOXIN) 125 MCG tablet Take 125 mcg by mouth daily      cyanocobalamin 1000 MCG/ML injection Inject 1,000 mcg into the muscle once (Patient not taking: Reported on 10/11/2022)      escitalopram (LEXAPRO) 10 MG tablet Take 1 tablet by mouth daily 90 tablet 3    fluticasone (FLONASE) 50 MCG/ACT nasal spray 1 spray by Each Nostril route 2 times daily 32 g 3        Allergies:  Seasonal    History of allergic reaction to anesthesia:  No    PHYSICAL EXAM:      /67   Pulse 78   Temp 98.3 °F (36.8 °C) (Oral)   Resp 18   Ht 5' 6\" (1.676 m)   Wt 178 lb (80.7 kg)   SpO2 94%   BMI 28.73 kg/m²  I Heart:  Normal apical impulse, regular rate and rhythm, normal S1 and S2, no S3 or S4, and no murmur noted    Lungs:  No increased work of breathing, good air exchange, clear to auscultation bilaterally, no crackles or wheezing    Abdomen:  No scars, normal bowel sounds, soft, non-distended, non-tender, no masses palpated, no hepatosplenomegally      ASA Grade:  ASA 3 - Patient with moderate systemic disease with functional limitations      ASSESSMENT AND PLAN:    1. Patient is a 76 y.o. female here for egd/colonoscopy with deep sedation  2. Procedure options, risks and benefits reviewed with patient. Patient expresses understanding.             Sherwin Harrison MD  [address]  [phone]

## 2022-10-13 ENCOUNTER — OFFICE VISIT (OUTPATIENT)
Dept: SURGERY | Age: 77
End: 2022-10-13
Payer: MEDICARE

## 2022-10-13 VITALS
WEIGHT: 184 LBS | HEART RATE: 95 BPM | DIASTOLIC BLOOD PRESSURE: 70 MMHG | OXYGEN SATURATION: 93 % | HEIGHT: 66 IN | BODY MASS INDEX: 29.57 KG/M2 | SYSTOLIC BLOOD PRESSURE: 121 MMHG | TEMPERATURE: 97.4 F

## 2022-10-13 DIAGNOSIS — Z98.84 S/P GASTRIC BYPASS: ICD-10-CM

## 2022-10-13 DIAGNOSIS — K56.699 COLONIC STRICTURE (HCC): Primary | ICD-10-CM

## 2022-10-13 PROCEDURE — 1124F ACP DISCUSS-NO DSCNMKR DOCD: CPT | Performed by: SURGERY

## 2022-10-13 PROCEDURE — 99205 OFFICE O/P NEW HI 60 MIN: CPT | Performed by: SURGERY

## 2022-10-13 NOTE — PROGRESS NOTES
[facility] [place] [facility] PHYSICIANS [place] COLON AND RECTAL SURGERY  [address] [facility] [address]  [address] [address]  Dept: [phone]  Dept Fax: [phone]: [phone]    Visit Date: 10/13/2022    Sammie Medina is a 76 y.o. female who presents today for: New Patient (stricture)      HPI:       Sammie Medina is a 76 y.o. female referred to me for further evaluation regarding colonic stricture seen on recent colonoscopy    Josesito Sotelo is accompanied by her good friend today in the office. She has been having increasing issues with bloating and rectal bleeding. She recently underwent colonoscopy with Dr. Sohail Love of GI on 10/11/2022 and was found to have a severe stricture of the colon at 45 cm from the anal verge. Biopsies were obtained but pathology has not resulted yet. She has a CT scan tomorrow that was ordered by Dr. Sohail Love. She denies previous history of colitis or ischemic colitis. Denies history of diverticulitis. She does have a history of appendectomy, cholecystectomy and open gastric bypass surgery done 25 years ago. Patient's problem list, medications, past medical, surgical, family, and social histories were reviewed and updated in the chart as indicated today. Past Medical History:   Diagnosis Date    A-fib [facility])     Age-related osteoporosis without current pathological fracture 11/08/2019    Anxiety     Colon polyp     Depression     Hypoglycemia     Vitamin B12 deficiency        Past Surgical History:   Procedure Laterality Date    APPENDECTOMY      CERVICAL FUSION N/A 11/7/2019    C1-2 FUSION POSTERIOR performed by Cara Silva MD at [address] [place] [address] [address]  2001    COLONOSCOPY  9/2012    q 5    COLONOSCOPY N/A 10/11/2022    COLONOSCOPY WITH BIOPSY performed by Christy Wellington MD at [facility] N/A 10/11/2022    COLONOSCOPY SUBMUCOSAL/spot  INJECTION performed [facility] Christy [facility] MD [facility]    GASTRIC BYPASS SURGERY      HYSTERECTOMY (CERVIX STATUS UNKNOWN)      TONSILLECTOMY      UPPER GASTROINTESTINAL ENDOSCOPY N/A 10/11/2022    EGD BIOPSY performed by Bia Dash MD at [address] ENDOSCOPY       Cancer-related family history includes Cancer in her brother, father, and mother. Social History:   Social History     Tobacco Use    Smoking status: Former     Packs/day: 1.00     Years: 30.00     Pack years: 30.00     Types: Cigarettes     Start date: 10/10/1974     Quit date: 10/10/2004     Years since quittin.0    Smokeless tobacco: Never    Tobacco comments:     15 years ago   Substance Use Topics    Alcohol use: Yes     Alcohol/week: 0.0 standard drinks     Comment: occ      Tobacco cessation counseling provided as appropriate. REVIEW OF SYSTEMS:    Pertinent positives and negatives are mentioned in the HPI above. Otherwise, all other systems were reviewed and negative. Objective:     Physical Exam   /70   Pulse 95   Temp 97.4 °F (36.3 °C) (Oral)   Ht 5' 6\" (1.676 m)   Wt 184 lb (83.5 kg)   SpO2 93%   BMI 29.70 kg/m²   Constitutional: Appears well-developed and well-nourished. Grooming appropriate. No gross deformities. Body mass index is 29.7 kg/m². Eyes: No scleral icterus. Conjunctiva/lids normal. Vision intact grossly. Pupils equal/symmetric, reactive bilaterally. ENT: External ears/nose without defect, scars, or masses. Hearing grossly intact. No facial deformity. Lips normal, normal dentition. Neck: No masses. Trachea midline. No crepitus. Thyroid not enlarged. Cardiovascular: Normal rate. No peripheral edema. Abdominal aorta normal size to palpation. Pulmonary/Chest: Effort normal. No respiratory distress. No wheezes. No use of accessory muscles. Musculoskeletal: Normal range of motion x all 4 extremities and head/neck, without deformity, pain, or crepitus, with normal strength and tone. Normal gait. Nails without clubbing or cyanosis. Neurological: Alert and oriented to person, place, and time. No gross deficits. Sensation intact. Skin: Skin is dry. No rashes noted. No pallor. No induration of nodules. Psychiatric: Normal mood and affect. Behavior normal. Oriented to person, place, and time. Judgment and insight reasonable. Abdominal/wound: Soft, nontender, nondistended      Labs reviewed: None  Radiology reviewed: CT scan reordered with rectal contrast for tomorrow    Last colonoscopy: Dr. Hemant Walters, 2 days ago    Coordination of care with GI. Coordination of care with radiology. Assessment/Plan:     A/P:  New problem(s) with uncertain prognosis: Colonic stricture  Established problem(s): History of open gastric bypass surgery  Additional workup/treatment planned: CT scan tomorrow, possible surgical intervention  Risk of complications/morbidity: High    I discussed with Jason Valderrama and her friend today in the office the various etiology of colonic strictures. Discussed malignancy is high on the differential, but also discussed ischemic colitis, diverticular stricture, or adhesions. Discussed Crohn's and ulcerative colitis. At this point we do not have any pathology results back for definitive diagnosis. She does have a CT scan that was ordered for tomorrow. I arranged for them to also perform rectal contrast to evaluate the extent of the obstruction. Discussed with her that if she does have a complete obstruction, she may need urgent surgery and likely colostomy. Incomplete obstruction, depending on the etiology, she will likely need partial colectomy with anastomosis versus ostomy. Discussed that her situation is high risk given her previous open gastric bypass surgery. Continue with current medications    DISPOSITION: We will touch base after her CT scan    My findings will be relayed to consulting practitioner or PCP via Epic    Note completed using dictation software, please excuse any errors.     Electronically signed by Ciro Gomez MD on 10/13/2022 at 1:19 PM

## 2022-10-14 ENCOUNTER — HOSPITAL ENCOUNTER (OUTPATIENT)
Dept: CT IMAGING | Age: 77
Discharge: HOME | End: 2022-10-14
Payer: MEDICARE

## 2022-10-14 DIAGNOSIS — C18.6 MALIGNANT NEOPLASM OF DESCENDING COLON (HCC): Primary | ICD-10-CM

## 2022-10-14 DIAGNOSIS — D50.9 IRON DEFICIENCY ANEMIA, UNSPECIFIED IRON DEFICIENCY ANEMIA TYPE: ICD-10-CM

## 2022-10-14 DIAGNOSIS — K56.699 COLON STRICTURE (HCC): ICD-10-CM

## 2022-10-14 LAB
GFR AFRICAN AMERICAN: >60
GFR NON-AFRICAN AMERICAN: >60
PERFORMED ON: NORMAL
POC CREATININE: 0.6 MG/DL (ref 0.6–1.2)
POC SAMPLE TYPE: NORMAL

## 2022-10-14 PROCEDURE — 74177 CT ABD & PELVIS W/CONTRAST: CPT

## 2022-10-14 PROCEDURE — 6360000004 HC RX CONTRAST MEDICATION: Performed by: INTERNAL MEDICINE

## 2022-10-14 PROCEDURE — A4641 RADIOPHARM DX AGENT NOC: HCPCS | Performed by: INTERNAL MEDICINE

## 2022-10-14 PROCEDURE — 82565 ASSAY OF CREATININE: CPT

## 2022-10-14 RX ADMIN — IOPAMIDOL 75 ML: 755 INJECTION, SOLUTION INTRAVENOUS at 18:47

## 2022-10-14 RX ADMIN — BARIUM SULFATE 900 ML: 21 SUSPENSION ORAL at 18:47

## 2022-10-14 RX ADMIN — IOHEXOL 10 ML: 300 INJECTION, SOLUTION INTRAVENOUS at 18:47

## 2022-10-15 NOTE — RESULT ENCOUNTER NOTE
I spoke to Caleb Boateng this morning on the telephone. Discussed biopsy results showing adenocarcinoma. Discussed CT scan that does not show any evidence of metastatic disease. Small bowel abnormality that is mentioned on the scan I think is related to her previous gastric bypass (her jejunojejunostomy anastomosis ) and not of particular concern. I will ask Dr Enoc Perez of bariatric surgery to review the images for a second opinion. Regardless, there is no evidence of complete obstruction, but clinically she is having narrow stools, so we will try to expedite her surgery in the next few weeks.

## 2022-10-17 ENCOUNTER — TELEPHONE (OUTPATIENT)
Dept: SURGERY | Age: 77
End: 2022-10-17

## 2022-10-17 ENCOUNTER — PREP FOR PROCEDURE (OUTPATIENT)
Dept: SURGERY | Age: 77
End: 2022-10-17

## 2022-10-17 RX ORDER — SODIUM CHLORIDE 0.9 % (FLUSH) 0.9 %
5-40 SYRINGE (ML) INJECTION EVERY 12 HOURS SCHEDULED
Status: CANCELLED | OUTPATIENT
Start: 2022-10-17

## 2022-10-17 RX ORDER — SODIUM CHLORIDE 9 MG/ML
INJECTION, SOLUTION INTRAVENOUS PRN
Status: CANCELLED | OUTPATIENT
Start: 2022-10-17

## 2022-10-17 RX ORDER — ENOXAPARIN SODIUM 100 MG/ML
40 INJECTION SUBCUTANEOUS ONCE
Status: CANCELLED | OUTPATIENT
Start: 2022-10-17 | End: 2022-10-17

## 2022-10-17 RX ORDER — ACETAMINOPHEN 325 MG/1
1000 TABLET ORAL ONCE
Status: CANCELLED | OUTPATIENT
Start: 2022-10-17 | End: 2022-10-17

## 2022-10-17 RX ORDER — SODIUM CHLORIDE 0.9 % (FLUSH) 0.9 %
5-40 SYRINGE (ML) INJECTION PRN
Status: CANCELLED | OUTPATIENT
Start: 2022-10-17

## 2022-10-18 DIAGNOSIS — K56.699 COLONIC STRICTURE (HCC): Primary | ICD-10-CM

## 2022-10-18 RX ORDER — NEOMYCIN SULFATE 500 MG/1
TABLET ORAL
Qty: 6 TABLET | Refills: 0 | Status: ON HOLD | OUTPATIENT
Start: 2022-10-18 | End: 2022-10-25 | Stop reason: HOSPADM

## 2022-10-18 RX ORDER — METRONIDAZOLE 500 MG/1
TABLET ORAL
Qty: 3 TABLET | Refills: 0 | Status: ON HOLD | OUTPATIENT
Start: 2022-10-18 | End: 2022-10-25 | Stop reason: HOSPADM

## 2022-10-18 RX ORDER — ONDANSETRON 4 MG/1
4 TABLET, ORALLY DISINTEGRATING ORAL EVERY 8 HOURS PRN
Qty: 3 TABLET | Refills: 0 | Status: SHIPPED | OUTPATIENT
Start: 2022-10-18

## 2022-10-18 NOTE — TELEPHONE ENCOUNTER
Patient has been scheduled for:    Procedure: Robo partial colectomy  Date: 10/24/22  Time: 11:15am  Arrival: 9:15AM  [facility]  Prep? #2 discussed by phone, sent to my-chart    Pre-op? pcp    Post-op Appt? 11/8/22 at 11:00AM    Patient advised they will be admitted    Orders routed to surgery scheduling. Instructions have been mailed/emailed to:   My-chart

## 2022-10-19 ENCOUNTER — OFFICE VISIT (OUTPATIENT)
Dept: FAMILY MEDICINE CLINIC | Age: 77
End: 2022-10-19
Payer: MEDICARE

## 2022-10-19 VITALS
WEIGHT: 187 LBS | SYSTOLIC BLOOD PRESSURE: 120 MMHG | HEIGHT: 66 IN | HEART RATE: 89 BPM | OXYGEN SATURATION: 98 % | TEMPERATURE: 97.3 F | BODY MASS INDEX: 30.05 KG/M2 | DIASTOLIC BLOOD PRESSURE: 78 MMHG

## 2022-10-19 DIAGNOSIS — Z01.818 PREOP EXAM FOR INTERNAL MEDICINE: ICD-10-CM

## 2022-10-19 DIAGNOSIS — I50.22 CHRONIC SYSTOLIC (CONGESTIVE) HEART FAILURE (HCC): ICD-10-CM

## 2022-10-19 PROCEDURE — 99214 OFFICE O/P EST MOD 30 MIN: CPT | Performed by: NURSE PRACTITIONER

## 2022-10-19 NOTE — ASSESSMENT & PLAN NOTE
Perioperative risk related to the patient's upcoming surgery is considered low. she is cleared for surgery.   Pre-op exam was completed on 10/19/22 11:40 AM.

## 2022-10-19 NOTE — PROGRESS NOTES
Subjective:      Emelyn Dowell is a 76 y.o. female who presents to the office today for a preoperative consultation at the request of surgeon Dr. Merly England and Dr. Katt Kent who plans on performing ROBOTIC OR LAPAROSCOPIC PARTIAL COELCTOMY, DESCENDING VERSUS SIGMOID, POSSIBLE OPEN on October 24. This consultation is requested for the specific conditions prompting preoperative evaluation (i.e. because of potential affect on operative risk): Chronic systolic heart failure. Planned anesthesia is General.  The patient has the following known anesthesia issues:  none   Patient has a bleeding risk of : None patient does not have objection to receiving blood products if needed. Past Medical History:   Diagnosis Date    A-fib [facility])     Age-related osteoporosis without current pathological fracture 11/08/2019    Anxiety     Colon polyp     Depression     Hypoglycemia     Vitamin B12 deficiency      Patient Active Problem List    Diagnosis Date Noted    Atheroembolism of other site ([facility] [place] [facility] 09/20/2022    Chronic systolic (congestive) heart failure 09/20/2022    Gastrointestinal hemorrhage 09/20/2022    Age-related osteoporosis without current pathological fracture 11/08/2019    CRAVEN (dyspnea on exertion) 06/09/2020    Vision loss, left eye 03/02/2020    Odontoid fracture, closed, initial encounter ([facility] 11/07/2019    Stress fracture of cervical vertebra with delayed healing 10/28/2019    Stenosis of left external auditory canal 10/10/2019    Urinary frequency 05/18/2018    Ex-smoker 02/02/2018    Preop exam for internal medicine 04/10/2015    Colon polyp     Lower back pain 01/26/2015    Fatigue 01/30/2014    GERD (gastroesophageal reflux disease) 01/17/2014    Iron deficiency anemia 08/06/2013    S/P gastric bypass 02/13/2012    Depression     Anxiety      Past Surgical History:   Procedure Laterality Date    APPENDECTOMY      CERVICAL FUSION N/A 11/7/2019    C1-2 FUSION POSTERIOR performed by Tg Escobar MD at [address] CHOLECYSTECTOMY      COLONOSCOPY  2012    q 5    COLONOSCOPY N/A 10/11/2022    COLONOSCOPY WITH BIOPSY performed by Nhan Leroy MD at [facility] N/A 10/11/2022    COLONOSCOPY SUBMUCOSAL/spot  INJECTION performed by Nhan Leroy MD at [address] [place] [address] (CERVIX STATUS UNKNOWN)  1978    TONSILLECTOMY      UPPER GASTROINTESTINAL ENDOSCOPY N/A 10/11/2022    EGD BIOPSY performed by Nhan Leroy MD at [facility] ENDOSCOPY     Family History   Problem Relation Age of Onset    Cancer Mother         lung    Cancer Father         lung    Cancer Brother         prostate     Social History     Socioeconomic History    Marital status:     Number of children: 3   Occupational History    Occupation: [employer]    Tobacco Use    Smoking status: Former     Packs/day: 1.00     Years: 30.00     Pack years: 30.00     Types: Cigarettes     Start date: 10/10/1974     Quit date: 10/10/2004     Years since quittin.0    Smokeless tobacco: Never    Tobacco comments:     15 years ago   Vaping Use    Vaping Use: Never used   Substance and Sexual Activity    Alcohol use:  Yes     Alcohol/week: 0.0 standard drinks     Comment: occ    Drug use: No    Sexual activity: Not Currently     Partners: Male   Social History Narrative    Lives by self; work 35 h/e    Lots of friends    Goes to [employer]    No exercise     Current Outpatient Medications   Medication Sig Dispense Refill    furosemide (LASIX) 40 MG tablet TAKE 1 TABLET BY MOUTH DAILY 90 tablet 1    ipratropium (ATROVENT) 0.06 % nasal spray USE 2 SPRAYS IN EACH NOSTRIL IN THE MORNING, 2 SPRAYS AT NOON, 2 SPRAYS IN THE EVENING, AND 2 SPRAYS BEFORE BEDTIME 15 mL 3    midodrine (PROAMATINE) 2.5 MG tablet Take 5 mg by mouth in the morning and at bedtime      digoxin (LANOXIN) 125 MCG tablet Take 125 mcg by mouth daily      escitalopram (LEXAPRO) 10 MG tablet Take 1 tablet by mouth daily 90 tablet 3    fluticasone (FLONASE) 50 MCG/ACT nasal spray 1 spray by Each Nostril route 2 times daily 32 g 3    metroNIDAZOLE (FLAGYL) 500 MG tablet Take one tablet by mouth 3 times on the day prior to surgery. Take one tablet at 1pm, 2pm and 9pm. (Patient not taking: Reported on 10/19/2022) 3 tablet 0    neomycin (MYCIFRADIN) 500 MG tablet Take two tablets 3 times the day before surgery. Take two tablets at 1pm, two tablets at 2pm and two tablets at 9pm. (Patient not taking: Reported on 10/19/2022) 6 tablet 0    ondansetron (ZOFRAN ODT) 4 MG disintegrating tablet Place 1 tablet under the tongue every 8 hours as needed for Nausea or Vomiting (Patient not taking: Reported on 10/19/2022) 3 tablet 0    aspirin 81 MG EC tablet Take 81 mg by mouth daily (Patient not taking: Reported on 10/19/2022)       No current facility-administered medications for this visit. Allergies   Allergen Reactions    Seasonal      Review of Systems  A comprehensive review of systems was negative.       Objective:      /78 (Site: Left Upper Arm, Position: Sitting, Cuff Size: Medium Adult)   Pulse 89   Temp 97.3 °F (36.3 °C)   Ht 5' 6\" (1.676 m)   Wt 187 lb (84.8 kg)   SpO2 98%   BMI 30.18 kg/m²     General Appearance:  Alert, cooperative, no distress, appears stated age   Head:  Normocephalic, without obvious abnormality, atraumatic   Eyes:  PERRL, conjunctiva/corneas clear, EOM's intact, fundi benign, both eyes   Ears:  Normal TM's and external ear canals, both ears   Nose: Nares normal, septum midline, mucosa normal, no drainage or sinus tenderness   Throat: Lips, mucosa, and tongue normal; teeth and gums normal   Neck: Supple, symmetrical, trachea midline, no adenopathy, thyroid: not enlarged, symmetric, no tenderness/mass/nodules, no carotid bruit or JVD   Back:   Symmetric, no curvature, ROM normal, no CVA tenderness   Lungs:   Clear to auscultation bilaterally, respirations unlabored   Chest Wall:  No tenderness or deformity   Heart:  Regular rate and rhythm, S1, S2 normal, no murmur, rub or gallop   Abdomen:   Soft, non-tender, bowel sounds active all four quadrants,  no masses, no organomegaly   Genitalia:  Normal male   Rectal:  Normal tone, normal prostate, no masses or tenderness;  guaiac negative stool   Extremities: Extremities normal, atraumatic, no cyanosis or edema   Pulses: 2+ and symmetric   Skin: Skin color, texture, turgor normal, no rashes or lesions   Lymph nodes: Cervical, supraclavicular, and axillary nodes normal   Neurologic: Normal           Cardiographics  ECG:  Reviewed in Care Everywhere follows by cardiology  Echocardiogram:  Reviewed from most recent echocardiogram ejection fraction of 45%    Imaging  Chest X-Ray:  Not indicated      Lab Review   Hospital Outpatient Visit on 10/14/2022   Component Date Value    POC Creatinine 10/14/2022 0.6     GFR Non- 10/14/2022 >60     GFR  10/14/2022 >60     Sample Type 10/14/2022 IVANA     Performed on 10/14/2022 SEE BELOW    Orders Only on 10/03/2022   Component Date Value    WBC 10/03/2022 5.3     RBC 10/03/2022 4.40     Hemoglobin 10/03/2022 9.7 (A)     Hematocrit 10/03/2022 31.1 (A)     MCV 10/03/2022 70.7 (A)     MCH 10/03/2022 21.9 (A)     MCHC 10/03/2022 31.0     RDW 10/03/2022 28.2 (A)     Platelets 65/43/9104 329     MPV 10/03/2022 7.0    Orders Only on 09/27/2022   Component Date Value    WBC 09/27/2022 4.0     RBC 09/27/2022 4.40     Hemoglobin 09/27/2022 9.4 (A)     Hematocrit 09/27/2022 31.0 (A)     MCV 09/27/2022 70.5 (A)     MCH 09/27/2022 21.4 (A)     MCHC 09/27/2022 30.3 (A)     RDW 09/27/2022 25.2 (A)     Platelets 29/62/9020 391     MPV 09/27/2022 6.9    Hospital Outpatient Visit on 09/20/2022   Component Date Value    ABO/Rh 09/20/2022 A POS     Antibody Screen 09/20/2022 NEG     Product Code Blood Bank 09/20/2022 [id number]     Description Blood Bank 09/20/2022 Red Blood Cells, Apheresis, Leuko-reduced     Unit Number 09/20/2022 [id number]     Dispense Status Blood Ba* 09/20/2022 transfused     Product Code Blood Bank 09/20/2022 [id number]     Description Blood Bank 09/20/2022 Red Blood Cells, Apheresis, Leuko-reduced     Unit Number 09/20/2022 [id number]     Dispense Status Blood Ba* 09/20/2022 transfused          Assessment:        76 y.o. female with planned surgery as above. Known risk factors for perioperative complications: Congestive heart failure    Difficulty with intubation is not anticipated. Current medications which may produce withdrawal symptoms if withheld perioperatively: none        Plan:      1. Preoperative workup as follows none  2. Change in medication regimen before surgery: Per surgery recommendations  3. Prophylaxis for cardiac events with perioperative beta-blockers: should be considered, specific regimen per anesthesia  4. Invasive hemodynamic monitoring perioperatively: at the discretion of anesthesiologist  5. Deep vein thrombosis prophylaxis postoperatively:regimen to be chosen by surgical team  6. Surveillance for postoperative MI with ECG immediately postoperatively and on postoperative days 1 and 2 AND troponin levels 24 hours postoperatively and on day 4 or hospital discharge (whichever comes first): should be considered  7. Other measures: none     Preop exam for internal medicine   Perioperative risk related to the patient's upcoming surgery is considered low. she is cleared for surgery.   Pre-op exam was completed on 10/19/22 11:40 AM.        Chronic systolic (congestive) heart failure   Monitored by specialist- no acute findings meriting change in the plan

## 2022-10-20 NOTE — PROGRESS NOTES
Place patient label inside box (if no patient label, complete below)  Name:  :  MR#:     Roshan Parker / PROCEDURE  I (we)Sheryl Asp (Patient Name) authorize DR. Jessika Gu (Provider / Juan Snare) and/or such assistants as may be selected by him/her, to perform the following operation/procedure(s): ROBOTIC REDUCTION OF INTUSSUSCEPTION, POSSIBLE REVISION OF PREVIOUS GASTRIC BYPASS       Note: If unable to obtain consent prior to an emergent procedure, document the emergent reason in the medical record. This procedure has been explained to my (our) satisfaction and included in the explanation was: The intended benefit, nature, and extent of the procedure to be performed; The significant risks involved and the probability of success; Alternative procedures and methods of treatment; The dangers and probable consequences of such alternatives (including no procedure or treatment); The expected consequences of the procedure on my future health; Whether other qualified individuals would be performing important surgical tasks and/or whether  would be present to advise or support the procedure. I (we) understand that there are other risks of infection and other serious complications in the pre-operative/procedural and postoperative/procedural stages of my (our) care. I (we) have asked all of the questions which I (we) thought were important in deciding whether or not to undergo treatment or diagnosis. These questions have been answered to my (our) satisfaction. I (we) understand that no assurance can be given that the procedure will be a success, and no guarantee or warranty of success has been given to me (us).     It has been explained to me (us) that during the course of the operation/procedure, unforeseen conditions may be revealed that necessitate extension of the original procedure(s) or different procedure(s) than those set forth in Paragraph 1. I (we) authorize and request that the above-named physician, his/her assistants or his/her designees, perform procedures as necessary and desirable if deemed to be in my (our) best interest.     Revised 8/2/2021                                                                          Page 1 of 2       I acknowledge that health care personnel may be observing this procedure for the purpose of medical education or other specified purposes as may be necessary as requested and/or approved by my (our) physician. I (we) consent to the disposal by the hospital Pathologist of the removed tissue, parts or organs in accordance with hospital policy. I do ____ do not ____ consent to the use of a local infiltration pain blocking agent that will be used by my provider/surgical provider to help alleviate pain during my procedure. I do ____ do not ____ consent to an emergent blood transfusion in the case of a life-threatening situation that requires blood components to be administered. This consent is valid for 24 hours from the beginning of the procedure. This patient does ____ or does not ____ currently have a DNR status/order. If DNR order is in place, obtain Addendum to the Surgical Consent for ALL Patients with a DNR Order to address kayley-operative status for limited intervention or DNR suspension.      I have read and fully understand the above Consent for Operation/Procedure and that all blanks were completed before I signed the consent.   _____________________________       _____________________      ____/____am/pm  Signature of Patient or legal representative      Printed Name / Relationship            Date / Time   ____________________________       _____________________      ____/____am/pm  Witness to Signature                                    Printed Name                    Date / Time    If patient is unable to sign or is a minor, complete the following)  Patient is a minor, ____ years of age, or unable to sign because:   ______________________________________________________________________________________________    If a phone consent is obtained, consent will be documented by using two health care professionals, each affirming that the consenting party has no questions and gives consent for the procedure discussed with the physician/provider.   _____________________          ____________________       _____/_____am/pm   2nd witness to phone consent        Printed name           Date / Time    Informed Consent:  I have provided the explanation described above in section 1 to the patient and/or legal representative.  I have provided the patient and/or legal representative with an opportunity to ask any questions about the proposed operation/procedure.   ___________________________          ____________________         ____/____am/pm  Provider / Proceduralist                            Printed name            Date / Time  Revised 8/2/2021                                                                      Page 2 of 2

## 2022-10-20 NOTE — PROGRESS NOTES
Place patient label inside box (if no patient label, complete below)  Name:  :  MR#:     Ruth Calender / PROCEDURE  I (we), David Urban (Patient Name) authorize DR. Nilsa Oshea (Provider / Shanthi Orlando) and/or such assistants as may be selected by him/her, to perform the following operation/procedure(s): ROBOTIC OR LAPAROSCOPIC PARTIAL COLECTOMY, DESCENDING VERSUS SIGMOID, POSSIBLE OPEN       Note: If unable to obtain consent prior to an emergent procedure, document the emergent reason in the medical record. This procedure has been explained to my (our) satisfaction and included in the explanation was: The intended benefit, nature, and extent of the procedure to be performed; The significant risks involved and the probability of success; Alternative procedures and methods of treatment; The dangers and probable consequences of such alternatives (including no procedure or treatment); The expected consequences of the procedure on my future health; Whether other qualified individuals would be performing important surgical tasks and/or whether  would be present to advise or support the procedure. I (we) understand that there are other risks of infection and other serious complications in the pre-operative/procedural and postoperative/procedural stages of my (our) care. I (we) have asked all of the questions which I (we) thought were important in deciding whether or not to undergo treatment or diagnosis. These questions have been answered to my (our) satisfaction. I (we) understand that no assurance can be given that the procedure will be a success, and no guarantee or warranty of success has been given to me (us).     It has been explained to me (us) that during the course of the operation/procedure, unforeseen conditions may be revealed that necessitate extension of the original procedure(s) or different procedure(s) than those set forth in Paragraph 1. I (we) authorize and request that the above-named physician, his/her assistants or his/her designees, perform procedures as necessary and desirable if deemed to be in my (our) best interest.     Revised 8/2/2021                                                                          Page 1 of 2       I acknowledge that health care personnel may be observing this procedure for the purpose of medical education or other specified purposes as may be necessary as requested and/or approved by my (our) physician. I (we) consent to the disposal by the hospital Pathologist of the removed tissue, parts or organs in accordance with hospital policy. I do ____ do not ____ consent to the use of a local infiltration pain blocking agent that will be used by my provider/surgical provider to help alleviate pain during my procedure. I do ____ do not ____ consent to an emergent blood transfusion in the case of a life-threatening situation that requires blood components to be administered. This consent is valid for 24 hours from the beginning of the procedure. This patient does ____ or does not ____ currently have a DNR status/order. If DNR order is in place, obtain Addendum to the Surgical Consent for ALL Patients with a DNR Order to address kayley-operative status for limited intervention or DNR suspension.      I have read and fully understand the above Consent for Operation/Procedure and that all blanks were completed before I signed the consent.   _____________________________       _____________________      ____/____am/pm  Signature of Patient or legal representative      Printed Name / Relationship            Date / Time   ____________________________       _____________________      ____/____am/pm  Witness to Signature                                    Printed Name                    Date / Time    If patient is unable to sign or is a minor, complete the following)  Patient is a minor, ____ years of age, or unable to sign because:   ______________________________________________________________________________________________    If a phone consent is obtained, consent will be documented by using two health care professionals, each affirming that the consenting party has no questions and gives consent for the procedure discussed with the physician/provider.   _____________________          ____________________       _____/_____am/pm   2nd witness to phone consent        Printed name           Date / Time    Informed Consent:  I have provided the explanation described above in section 1 to the patient and/or legal representative.  I have provided the patient and/or legal representative with an opportunity to ask any questions about the proposed operation/procedure.   ___________________________          ____________________         ____/____am/pm  Provider / Proceduralist                            Printed name            Date / Time  Revised 8/2/2021                                                                      Page 2 of 2

## 2022-10-20 NOTE — PROGRESS NOTES
[facility] PRE-SURGICAL TESTING INSTRUCTIONS                      PRIOR TO PROCEDURE DATE:    1. PLEASE FOLLOW ANY INSTRUCTIONS GIVEN TO YOU PER YOUR SURGEON. 2. Arrange for someone to drive you home and be with you for the first 24 hours after discharge for your safety after your procedure for which you received sedation. Ensure it is someone we can share information with regarding your discharge. NOTE: At this time ONLY 2 ADULTS may accompany you & everyone must be MASKED. 3. You must contact your surgeon for instructions IF:  You are taking any blood thinners, aspirin, anti-inflammatory or vitamins. There is a change in your physical condition such as a cold, fever, rash, cuts, sores, or any other infection, especially near your surgical site. 4. Do not drink alcohol the day before or day of your procedure. Do not use any recreational marijuana at least 24 hours or street drugs (heroin, cocaine) at minimum 5 days prior to your procedure. 5. A Pre-Surgical History and Physical MUST be completed WITHIN 30 DAYS OR LESS prior to your procedure. by your Physician or an Urgent Care        THE DAY OF YOUR PROCEDURE:  1. Follow instructions for ARRIVAL TIME as DIRECTED BY YOUR SURGEON. 2. Enter the MAIN entrance from [address] and follow the signs to the free Parking Garage or [employer] (offered free of charge 7 am-5pm). 3. Enter the Main Entrance of the hospital (do not enter from the lower level of the parking garage). Upon entrance, check in with the  at the surgical information desk on your LEFT. Bring your insurance card and photo ID to register      4. DO NOT EAT ANYTHING 8 hours prior to arrival for surgery. You may have up to 8 ounces of water 4 hours prior to your arrival for surgery.    NOTE: ALL Gastric, Bariatric & Bowel surgery patients - you MUST follow your surgeon's instructions regarding eating/ drinking as you will have very specific instructions to follow. If you did not receive these, call your surgeon's office immediately. 5. MEDICATIONS:  Take the following medications with a SMALL sip of water: digoxin, lexapro, midodrine & nasal sprays. Do NOT take water pill (lasix)  Use your usual dose of inhalers the morning of surgery. BRING your rescue inhaler with you to hospital.   Anesthesia does NOT want you to take insulin the morning of surgery. They will control your blood sugar while you are at the hospital. Please contact your ordering physician for instructions regarding your insulin the night before your procedure. If you have an insulin pump, please keep it set on basal rate. Bariatric patient's call your surgeon if on diabetic medications as some may need to be stopped 1 week prior to surgery    6. Do not swallow additional water when brushing teeth. No gum, candy, mints, or ice chips. Refrain from smoking or at least decrease the amount on day of surgery. 7. Morning of surgery:   Take a shower with an antibacterial soap (i.e., Safeguard or Dial) OR your physician may have instructed you to use Hibiclens. Dress in loose, comfortable clothing appropriate for redressing after your procedure. Do not wear jewelry (including body piercings), make-up (especially NO eye make-up), fingernail polish (NO toenail polish if foot/leg surgery), lotion, powders, or metal hairclips. Do not shave or wax for 72 hours prior to procedure near your operative site. Shaving with a razor can irritate your skin and make it easier to develop an infection. On the day of your procedure, any hair that needs to be removed near the surgical site will be 'clipped' by a healthcare worker using a special clipper designed to avoid skin irritation. 8. Dentures, glasses, or contacts will need to be removed before your procedure. Bring cases for your glasses, contacts, dentures, or hearing aids to protect them while you are in surgery.       9. If you use a CPAP, please bring it with you on the day of your procedure. 10. We recommend that valuable personal belongings such as cash, cell phones, e-tablets, or jewelry, be left at home during your stay. The hospital will not be responsible for valuables that are not secured in the hospital safe. However, if your insurance requires a co-pay, you may want to bring a method of payment, i.e., Check or credit card, if you wish to pay your co-pay the day of surgery. 11. If you are to stay overnight, you may bring a bag with personal items. Please have any large items you may need brought in by your family after your arrival to your hospital room. 12. If you have a Living Will or Durable Power of , please bring a copy on the day of your procedure. How we keep you safe and work to prevent surgical site infections:   1. Health care workers should always check your ID bracelet to verify your name and birth date. You will be asked many times to state your name, date of birth, and allergies. 2. Health care workers should always clean their hands with soap or alcohol gel before providing care to you. It is okay to ask anyone if they cleaned their hands before they touch you. 3. You will be actively involved in verifying the type of procedure you are having and ensuring the correct surgical site. This will be confirmed multiple times prior to your procedure. Do NOT santos your surgery site UNLESS instructed to by your surgeon. 4. When you are in the operating room, your surgical site will be cleansed with a special soap, and in most cases, you will be given an antibiotic before the surgery begins. What to expect AFTER your procedure? 1. Immediately following your procedure, your will be taken to the PACU for the first phase of your recovery. Your nurse will help you recover from any potential side effects of anesthesia, such as extreme drowsiness, changes in your vital signs or breathing patterns. Nausea, headache, muscle aches, or sore throat may also occur after anesthesia. Your nurse will help you manage these potential side effects. 2. For comfort and safety, arrange to have someone at home with you for the first 24 hours after discharge. 3. You and your family will be given written instructions about your diet, activity, dressing care, medications, and return visits. 4. Once at home, should issues with nausea, pain, or bleeding occur, or should you notice any signs of infection, you should call your surgeon. 5. Always clean your hands before and after caring for your wound. Do not let your family touch your surgery site without cleaning their hands. 6. Narcotic pain medications can cause significant constipation. You may want to add a stool softener to your postoperative medication schedule or speak to your surgeon on how best to manage this SIDE EFFECT. SPECIAL INSTRUCTIONS reviewed bowel prep instructions from surgeon office. Should be able to see in [address] if any questions call surgeon office    Thank you for allowing us to care for you. We strive to exceed your expectations in the delivery of care and service provided to you and your family. If you need to contact the [place] [address] staff for any reason, please call us at [phone]    Instructions reviewed with patient during preadmission testing phone interview.   Kenia Shah RN.10/20/2022 .3:07 PM      ADDITIONAL EDUCATIONAL INFORMATION REVIEWED PER PHONE WITH YOU AND/OR YOUR FAMILY:  No Hibiclens® Bathing Instructions   Yes Antibacterial Soap- night before and day of surgery

## 2022-10-21 ENCOUNTER — TELEPHONE (OUTPATIENT)
Dept: SURGERY | Age: 77
End: 2022-10-21

## 2022-10-21 ENCOUNTER — ANESTHESIA EVENT (OUTPATIENT)
Dept: OPERATING ROOM | Age: 77
DRG: 330 | End: 2022-10-21
Payer: MEDICARE

## 2022-10-21 NOTE — TELEPHONE ENCOUNTER
I have placed a reminder call to patient for upcoming procedure. Did you speak directly to patient or leave a voicemail? Spoke to patient     Prep?      NPO 12AM  Prep #2    Patient will be admitted    Arrive at the main entrance [facility] at 9:15am

## 2022-10-22 ENCOUNTER — HOSPITAL ENCOUNTER (OUTPATIENT)
Dept: CT IMAGING | Age: 77
Discharge: HOME | DRG: 330 | End: 2022-10-22
Payer: MEDICARE

## 2022-10-22 DIAGNOSIS — C18.6 MALIGNANT NEOPLASM OF DESCENDING COLON (HCC): ICD-10-CM

## 2022-10-22 PROCEDURE — 6360000004 HC RX CONTRAST MEDICATION: Performed by: INTERNAL MEDICINE

## 2022-10-22 PROCEDURE — 71260 CT THORAX DX C+: CPT

## 2022-10-22 RX ADMIN — IOPAMIDOL 75 ML: 755 INJECTION, SOLUTION INTRAVENOUS at 08:42

## 2022-10-24 ENCOUNTER — ANESTHESIA (OUTPATIENT)
Dept: OPERATING ROOM | Age: 77
DRG: 330 | End: 2022-10-24
Payer: MEDICARE

## 2022-10-24 ENCOUNTER — HOSPITAL ENCOUNTER (INPATIENT)
Age: 77
LOS: 2 days | Discharge: HOME | DRG: 330 | End: 2022-10-26
Attending: SURGERY | Admitting: SURGERY
Payer: MEDICARE

## 2022-10-24 DIAGNOSIS — C18.6 CANCER OF DESCENDING COLON (HCC): ICD-10-CM

## 2022-10-24 DIAGNOSIS — R10.9 ACUTE POSTOPERATIVE PAIN OF ABDOMEN: Primary | ICD-10-CM

## 2022-10-24 DIAGNOSIS — G89.18 ACUTE POSTOPERATIVE PAIN OF ABDOMEN: Primary | ICD-10-CM

## 2022-10-24 PROBLEM — K56.1 INTUSSUSCEPTION OF JEJUNUM (HCC): Status: ACTIVE | Noted: 2022-10-24

## 2022-10-24 LAB
ABO/RH: NORMAL
ANION GAP SERPL CALCULATED.3IONS-SCNC: 9 MMOL/L (ref 3–16)
ANTIBODY SCREEN: NORMAL
BUN BLDV-MCNC: 10 MG/DL (ref 7–20)
CALCIUM SERPL-MCNC: 9.2 MG/DL (ref 8.3–10.6)
CHLORIDE BLD-SCNC: 106 MMOL/L (ref 99–110)
CO2: 25 MMOL/L (ref 21–32)
CREAT SERPL-MCNC: 0.7 MG/DL (ref 0.6–1.2)
GFR SERPL CREATININE-BSD FRML MDRD: >60 ML/MIN/{1.73_M2}
GLUCOSE BLD-MCNC: 106 MG/DL (ref 70–99)
GLUCOSE BLD-MCNC: 112 MG/DL (ref 70–99)
HCT VFR BLD CALC: 31.4 % (ref 36–48)
HEMOGLOBIN: 9.9 G/DL (ref 12–16)
MCH RBC QN AUTO: 22.6 PG (ref 26–34)
MCHC RBC AUTO-ENTMCNC: 31.6 G/DL (ref 31–36)
MCV RBC AUTO: 71.5 FL (ref 80–100)
PDW BLD-RTO: 27.6 % (ref 12.4–15.4)
PERFORMED ON: ABNORMAL
PLATELET # BLD: 319 K/UL (ref 135–450)
PMV BLD AUTO: 6.9 FL (ref 5–10.5)
POTASSIUM REFLEX MAGNESIUM: 4.2 MMOL/L (ref 3.5–5.1)
RBC # BLD: 4.39 M/UL (ref 4–5.2)
SODIUM BLD-SCNC: 140 MMOL/L (ref 136–145)
WBC # BLD: 4.1 K/UL (ref 4–11)

## 2022-10-24 PROCEDURE — 3700000001 HC ADD 15 MINUTES (ANESTHESIA): Performed by: SURGERY

## 2022-10-24 PROCEDURE — 2709999900 HC NON-CHARGEABLE SUPPLY: Performed by: SURGERY

## 2022-10-24 PROCEDURE — 86900 BLOOD TYPING SEROLOGIC ABO: CPT

## 2022-10-24 PROCEDURE — 2580000003 HC RX 258: Performed by: ANESTHESIOLOGY

## 2022-10-24 PROCEDURE — 2580000003 HC RX 258: Performed by: STUDENT IN AN ORGANIZED HEALTH CARE EDUCATION/TRAINING PROGRAM

## 2022-10-24 PROCEDURE — 2500000003 HC RX 250 WO HCPCS: Performed by: NURSE ANESTHETIST, CERTIFIED REGISTERED

## 2022-10-24 PROCEDURE — 0DTG4ZZ RESECTION OF LEFT LARGE INTESTINE, PERCUTANEOUS ENDOSCOPIC APPROACH: ICD-10-PCS | Performed by: SURGERY

## 2022-10-24 PROCEDURE — S2900 ROBOTIC SURGICAL SYSTEM: HCPCS | Performed by: SURGERY

## 2022-10-24 PROCEDURE — 85027 COMPLETE CBC AUTOMATED: CPT

## 2022-10-24 PROCEDURE — 6370000000 HC RX 637 (ALT 250 FOR IP): Performed by: ANESTHESIOLOGY

## 2022-10-24 PROCEDURE — 44204 LAPARO PARTIAL COLECTOMY: CPT | Performed by: SURGERY

## 2022-10-24 PROCEDURE — 44213 LAP MOBIL SPLENIC FL ADD-ON: CPT | Performed by: SURGERY

## 2022-10-24 PROCEDURE — 88341 IMHCHEM/IMCYTCHM EA ADD ANTB: CPT

## 2022-10-24 PROCEDURE — 0DNL4ZZ RELEASE TRANSVERSE COLON, PERCUTANEOUS ENDOSCOPIC APPROACH: ICD-10-PCS | Performed by: SURGERY

## 2022-10-24 PROCEDURE — 2580000003 HC RX 258: Performed by: NURSE ANESTHETIST, CERTIFIED REGISTERED

## 2022-10-24 PROCEDURE — 6360000002 HC RX W HCPCS: Performed by: SURGERY

## 2022-10-24 PROCEDURE — 88342 IMHCHEM/IMCYTCHM 1ST ANTB: CPT

## 2022-10-24 PROCEDURE — 0DSA4ZZ REPOSITION JEJUNUM, PERCUTANEOUS ENDOSCOPIC APPROACH: ICD-10-PCS | Performed by: SURGERY

## 2022-10-24 PROCEDURE — 6360000002 HC RX W HCPCS: Performed by: ANESTHESIOLOGY

## 2022-10-24 PROCEDURE — 44050 REDUCE BOWEL OBSTRUCTION: CPT | Performed by: SURGERY

## 2022-10-24 PROCEDURE — 88309 TISSUE EXAM BY PATHOLOGIST: CPT

## 2022-10-24 PROCEDURE — 64488 TAP BLOCK BI INJECTION: CPT | Performed by: ANESTHESIOLOGY

## 2022-10-24 PROCEDURE — 3600000019 HC SURGERY ROBOT ADDTL 15MIN: Performed by: SURGERY

## 2022-10-24 PROCEDURE — 6360000002 HC RX W HCPCS: Performed by: NURSE ANESTHETIST, CERTIFIED REGISTERED

## 2022-10-24 PROCEDURE — 2500000003 HC RX 250 WO HCPCS: Performed by: SURGERY

## 2022-10-24 PROCEDURE — 2720000010 HC SURG SUPPLY STERILE: Performed by: SURGERY

## 2022-10-24 PROCEDURE — 3600000009 HC SURGERY ROBOT BASE: Performed by: SURGERY

## 2022-10-24 PROCEDURE — 2580000003 HC RX 258: Performed by: SURGERY

## 2022-10-24 PROCEDURE — 80048 BASIC METABOLIC PNL TOTAL CA: CPT

## 2022-10-24 PROCEDURE — 86850 RBC ANTIBODY SCREEN: CPT

## 2022-10-24 PROCEDURE — 7100000000 HC PACU RECOVERY - FIRST 15 MIN: Performed by: SURGERY

## 2022-10-24 PROCEDURE — 86901 BLOOD TYPING SEROLOGIC RH(D): CPT

## 2022-10-24 PROCEDURE — 6370000000 HC RX 637 (ALT 250 FOR IP): Performed by: STUDENT IN AN ORGANIZED HEALTH CARE EDUCATION/TRAINING PROGRAM

## 2022-10-24 PROCEDURE — 6370000000 HC RX 637 (ALT 250 FOR IP): Performed by: SURGERY

## 2022-10-24 PROCEDURE — 3700000000 HC ANESTHESIA ATTENDED CARE: Performed by: SURGERY

## 2022-10-24 PROCEDURE — 7100000001 HC PACU RECOVERY - ADDTL 15 MIN: Performed by: SURGERY

## 2022-10-24 PROCEDURE — 1200000000 HC SEMI PRIVATE

## 2022-10-24 RX ORDER — DIGOXIN 125 MCG
125 TABLET ORAL DAILY
Status: DISCONTINUED | OUTPATIENT
Start: 2022-10-25 | End: 2022-10-26 | Stop reason: HOSPADM

## 2022-10-24 RX ORDER — SODIUM CHLORIDE, SODIUM LACTATE, POTASSIUM CHLORIDE, CALCIUM CHLORIDE 600; 310; 30; 20 MG/100ML; MG/100ML; MG/100ML; MG/100ML
INJECTION, SOLUTION INTRAVENOUS CONTINUOUS
Status: DISCONTINUED | OUTPATIENT
Start: 2022-10-24 | End: 2022-10-24 | Stop reason: HOSPADM

## 2022-10-24 RX ORDER — ESCITALOPRAM OXALATE 10 MG/1
10 TABLET ORAL DAILY
Status: DISCONTINUED | OUTPATIENT
Start: 2022-10-25 | End: 2022-10-26 | Stop reason: HOSPADM

## 2022-10-24 RX ORDER — SODIUM CHLORIDE 0.9 % (FLUSH) 0.9 %
5-40 SYRINGE (ML) INJECTION EVERY 12 HOURS SCHEDULED
Status: DISCONTINUED | OUTPATIENT
Start: 2022-10-24 | End: 2022-10-24 | Stop reason: HOSPADM

## 2022-10-24 RX ORDER — SODIUM CHLORIDE 0.9 % (FLUSH) 0.9 %
10 SYRINGE (ML) INJECTION PRN
Status: DISCONTINUED | OUTPATIENT
Start: 2022-10-24 | End: 2022-10-26 | Stop reason: HOSPADM

## 2022-10-24 RX ORDER — OXYCODONE HYDROCHLORIDE 5 MG/1
5 TABLET ORAL EVERY 4 HOURS PRN
Status: DISCONTINUED | OUTPATIENT
Start: 2022-10-24 | End: 2022-10-26 | Stop reason: HOSPADM

## 2022-10-24 RX ORDER — FENTANYL CITRATE 50 UG/ML
INJECTION, SOLUTION INTRAMUSCULAR; INTRAVENOUS PRN
Status: DISCONTINUED | OUTPATIENT
Start: 2022-10-24 | End: 2022-10-24 | Stop reason: SDUPTHER

## 2022-10-24 RX ORDER — MEPERIDINE HYDROCHLORIDE 25 MG/ML
12.5 INJECTION INTRAMUSCULAR; INTRAVENOUS; SUBCUTANEOUS EVERY 5 MIN PRN
Status: DISCONTINUED | OUTPATIENT
Start: 2022-10-24 | End: 2022-10-24 | Stop reason: HOSPADM

## 2022-10-24 RX ORDER — HYDROMORPHONE HCL 110MG/55ML
PATIENT CONTROLLED ANALGESIA SYRINGE INTRAVENOUS PRN
Status: DISCONTINUED | OUTPATIENT
Start: 2022-10-24 | End: 2022-10-24 | Stop reason: SDUPTHER

## 2022-10-24 RX ORDER — LIDOCAINE HYDROCHLORIDE 20 MG/ML
INJECTION, SOLUTION INTRAVENOUS PRN
Status: DISCONTINUED | OUTPATIENT
Start: 2022-10-24 | End: 2022-10-24 | Stop reason: SDUPTHER

## 2022-10-24 RX ORDER — GLYCOPYRROLATE 0.2 MG/ML
INJECTION INTRAMUSCULAR; INTRAVENOUS PRN
Status: DISCONTINUED | OUTPATIENT
Start: 2022-10-24 | End: 2022-10-24 | Stop reason: SDUPTHER

## 2022-10-24 RX ORDER — ACETAMINOPHEN 500 MG
1000 TABLET ORAL ONCE
Status: COMPLETED | OUTPATIENT
Start: 2022-10-24 | End: 2022-10-24

## 2022-10-24 RX ORDER — FUROSEMIDE 40 MG/1
40 TABLET ORAL DAILY
Status: DISCONTINUED | OUTPATIENT
Start: 2022-10-25 | End: 2022-10-26 | Stop reason: HOSPADM

## 2022-10-24 RX ORDER — ONDANSETRON 2 MG/ML
INJECTION INTRAMUSCULAR; INTRAVENOUS PRN
Status: DISCONTINUED | OUTPATIENT
Start: 2022-10-24 | End: 2022-10-24 | Stop reason: SDUPTHER

## 2022-10-24 RX ORDER — IPRATROPIUM BROMIDE 42 UG/1
2 SPRAY, METERED NASAL 3 TIMES DAILY
Status: DISCONTINUED | OUTPATIENT
Start: 2022-10-24 | End: 2022-10-26 | Stop reason: HOSPADM

## 2022-10-24 RX ORDER — SODIUM CHLORIDE 0.9 % (FLUSH) 0.9 %
5-40 SYRINGE (ML) INJECTION PRN
Status: DISCONTINUED | OUTPATIENT
Start: 2022-10-24 | End: 2022-10-24 | Stop reason: HOSPADM

## 2022-10-24 RX ORDER — MIDODRINE HYDROCHLORIDE 5 MG/1
5 TABLET ORAL 2 TIMES DAILY
Status: DISCONTINUED | OUTPATIENT
Start: 2022-10-24 | End: 2022-10-26 | Stop reason: HOSPADM

## 2022-10-24 RX ORDER — ENOXAPARIN SODIUM 100 MG/ML
40 INJECTION SUBCUTANEOUS ONCE
Status: COMPLETED | OUTPATIENT
Start: 2022-10-24 | End: 2022-10-24

## 2022-10-24 RX ORDER — ONDANSETRON 2 MG/ML
INJECTION INTRAMUSCULAR; INTRAVENOUS
Status: DISPENSED
Start: 2022-10-24 | End: 2022-10-25

## 2022-10-24 RX ORDER — METRONIDAZOLE 500 MG/100ML
500 INJECTION, SOLUTION INTRAVENOUS
Status: COMPLETED | OUTPATIENT
Start: 2022-10-24 | End: 2022-10-24

## 2022-10-24 RX ORDER — FLUTICASONE PROPIONATE 50 MCG
1 SPRAY, SUSPENSION (ML) NASAL 2 TIMES DAILY
Status: DISCONTINUED | OUTPATIENT
Start: 2022-10-24 | End: 2022-10-26 | Stop reason: HOSPADM

## 2022-10-24 RX ORDER — LEVOFLOXACIN 5 MG/ML
500 INJECTION, SOLUTION INTRAVENOUS
Status: COMPLETED | OUTPATIENT
Start: 2022-10-24 | End: 2022-10-24

## 2022-10-24 RX ORDER — SODIUM CHLORIDE 9 MG/ML
25 INJECTION, SOLUTION INTRAVENOUS PRN
Status: DISCONTINUED | OUTPATIENT
Start: 2022-10-24 | End: 2022-10-24 | Stop reason: HOSPADM

## 2022-10-24 RX ORDER — SODIUM CHLORIDE, SODIUM LACTATE, POTASSIUM CHLORIDE, AND CALCIUM CHLORIDE .6; .31; .03; .02 G/100ML; G/100ML; G/100ML; G/100ML
IRRIGANT IRRIGATION PRN
Status: DISCONTINUED | OUTPATIENT
Start: 2022-10-24 | End: 2022-10-24 | Stop reason: HOSPADM

## 2022-10-24 RX ORDER — SODIUM CHLORIDE 9 MG/ML
INJECTION, SOLUTION INTRAVENOUS PRN
Status: DISCONTINUED | OUTPATIENT
Start: 2022-10-24 | End: 2022-10-26 | Stop reason: HOSPADM

## 2022-10-24 RX ORDER — ONDANSETRON 2 MG/ML
4 INJECTION INTRAMUSCULAR; INTRAVENOUS EVERY 6 HOURS PRN
Status: DISCONTINUED | OUTPATIENT
Start: 2022-10-24 | End: 2022-10-26 | Stop reason: HOSPADM

## 2022-10-24 RX ORDER — SODIUM CHLORIDE, SODIUM LACTATE, POTASSIUM CHLORIDE, CALCIUM CHLORIDE 600; 310; 30; 20 MG/100ML; MG/100ML; MG/100ML; MG/100ML
INJECTION, SOLUTION INTRAVENOUS CONTINUOUS
Status: DISCONTINUED | OUTPATIENT
Start: 2022-10-24 | End: 2022-10-25

## 2022-10-24 RX ORDER — SODIUM CHLORIDE 0.9 % (FLUSH) 0.9 %
10 SYRINGE (ML) INJECTION EVERY 12 HOURS SCHEDULED
Status: DISCONTINUED | OUTPATIENT
Start: 2022-10-24 | End: 2022-10-26 | Stop reason: HOSPADM

## 2022-10-24 RX ORDER — ROCURONIUM BROMIDE 10 MG/ML
INJECTION, SOLUTION INTRAVENOUS PRN
Status: DISCONTINUED | OUTPATIENT
Start: 2022-10-24 | End: 2022-10-24 | Stop reason: SDUPTHER

## 2022-10-24 RX ORDER — SUCCINYLCHOLINE/SOD CL,ISO/PF 200MG/10ML
SYRINGE (ML) INTRAVENOUS PRN
Status: DISCONTINUED | OUTPATIENT
Start: 2022-10-24 | End: 2022-10-24 | Stop reason: SDUPTHER

## 2022-10-24 RX ORDER — PROPOFOL 10 MG/ML
INJECTION, EMULSION INTRAVENOUS PRN
Status: DISCONTINUED | OUTPATIENT
Start: 2022-10-24 | End: 2022-10-24 | Stop reason: SDUPTHER

## 2022-10-24 RX ORDER — ENOXAPARIN SODIUM 100 MG/ML
40 INJECTION SUBCUTANEOUS DAILY
Status: DISCONTINUED | OUTPATIENT
Start: 2022-10-25 | End: 2022-10-26 | Stop reason: HOSPADM

## 2022-10-24 RX ORDER — ONDANSETRON 4 MG/1
4 TABLET, ORALLY DISINTEGRATING ORAL EVERY 8 HOURS PRN
Status: DISCONTINUED | OUTPATIENT
Start: 2022-10-24 | End: 2022-10-26 | Stop reason: HOSPADM

## 2022-10-24 RX ORDER — SODIUM CHLORIDE 9 MG/ML
INJECTION, SOLUTION INTRAVENOUS PRN
Status: DISCONTINUED | OUTPATIENT
Start: 2022-10-24 | End: 2022-10-24 | Stop reason: HOSPADM

## 2022-10-24 RX ORDER — HYDRALAZINE HYDROCHLORIDE 20 MG/ML
10 INJECTION INTRAMUSCULAR; INTRAVENOUS
Status: DISCONTINUED | OUTPATIENT
Start: 2022-10-24 | End: 2022-10-24 | Stop reason: HOSPADM

## 2022-10-24 RX ORDER — PROCHLORPERAZINE EDISYLATE 5 MG/ML
5 INJECTION INTRAMUSCULAR; INTRAVENOUS
Status: COMPLETED | OUTPATIENT
Start: 2022-10-24 | End: 2022-10-24

## 2022-10-24 RX ORDER — ACETAMINOPHEN 500 MG
1000 TABLET ORAL EVERY 6 HOURS
Status: DISCONTINUED | OUTPATIENT
Start: 2022-10-24 | End: 2022-10-26 | Stop reason: HOSPADM

## 2022-10-24 RX ORDER — OXYCODONE HYDROCHLORIDE 5 MG/1
10 TABLET ORAL EVERY 4 HOURS PRN
Status: DISCONTINUED | OUTPATIENT
Start: 2022-10-24 | End: 2022-10-26 | Stop reason: HOSPADM

## 2022-10-24 RX ORDER — MIDODRINE HYDROCHLORIDE 5 MG/1
5 TABLET ORAL
Status: COMPLETED | OUTPATIENT
Start: 2022-10-24 | End: 2022-10-24

## 2022-10-24 RX ORDER — DIGOXIN 125 MCG
125 TABLET ORAL
Status: COMPLETED | OUTPATIENT
Start: 2022-10-24 | End: 2022-10-24

## 2022-10-24 RX ADMIN — PHENYLEPHRINE HYDROCHLORIDE 100 MCG: 10 INJECTION, SOLUTION INTRAMUSCULAR; INTRAVENOUS; SUBCUTANEOUS at 12:38

## 2022-10-24 RX ADMIN — ENOXAPARIN SODIUM 40 MG: 100 INJECTION SUBCUTANEOUS at 09:56

## 2022-10-24 RX ADMIN — MIDODRINE HYDROCHLORIDE 5 MG: 5 TABLET ORAL at 10:27

## 2022-10-24 RX ADMIN — GLYCOPYRROLATE 0.4 MG: 0.2 INJECTION INTRAMUSCULAR; INTRAVENOUS at 12:04

## 2022-10-24 RX ADMIN — FENTANYL CITRATE 50 MCG: 50 INJECTION, SOLUTION INTRAMUSCULAR; INTRAVENOUS at 11:53

## 2022-10-24 RX ADMIN — SODIUM CHLORIDE, PRESERVATIVE FREE 10 ML: 5 INJECTION INTRAVENOUS at 23:28

## 2022-10-24 RX ADMIN — ACETAMINOPHEN 1000 MG: 500 TABLET ORAL at 23:32

## 2022-10-24 RX ADMIN — ONDANSETRON 4 MG: 2 INJECTION INTRAMUSCULAR; INTRAVENOUS at 13:10

## 2022-10-24 RX ADMIN — DEXMEDETOMIDINE HYDROCHLORIDE 4 MCG: 100 INJECTION, SOLUTION INTRAVENOUS at 12:58

## 2022-10-24 RX ADMIN — PROPOFOL 150 MG: 10 INJECTION, EMULSION INTRAVENOUS at 11:17

## 2022-10-24 RX ADMIN — ROCURONIUM BROMIDE 45 MG: 10 INJECTION INTRAVENOUS at 11:30

## 2022-10-24 RX ADMIN — SUGAMMADEX 200 MG: 100 INJECTION, SOLUTION INTRAVENOUS at 13:33

## 2022-10-24 RX ADMIN — HYDROMORPHONE HYDROCHLORIDE 0.25 MG: 1 INJECTION, SOLUTION INTRAMUSCULAR; INTRAVENOUS; SUBCUTANEOUS at 14:47

## 2022-10-24 RX ADMIN — SODIUM CHLORIDE, POTASSIUM CHLORIDE, SODIUM LACTATE AND CALCIUM CHLORIDE: 600; 310; 30; 20 INJECTION, SOLUTION INTRAVENOUS at 12:30

## 2022-10-24 RX ADMIN — SODIUM CHLORIDE, POTASSIUM CHLORIDE, SODIUM LACTATE AND CALCIUM CHLORIDE: 600; 310; 30; 20 INJECTION, SOLUTION INTRAVENOUS at 10:10

## 2022-10-24 RX ADMIN — SODIUM CHLORIDE, POTASSIUM CHLORIDE, SODIUM LACTATE AND CALCIUM CHLORIDE: 600; 310; 30; 20 INJECTION, SOLUTION INTRAVENOUS at 10:01

## 2022-10-24 RX ADMIN — DIGOXIN 125 MCG: 125 TABLET ORAL at 10:28

## 2022-10-24 RX ADMIN — PROCHLORPERAZINE EDISYLATE 5 MG: 5 INJECTION, SOLUTION INTRAMUSCULAR; INTRAVENOUS at 14:37

## 2022-10-24 RX ADMIN — LIDOCAINE HYDROCHLORIDE 100 MG: 20 INJECTION, SOLUTION INTRAVENOUS at 11:17

## 2022-10-24 RX ADMIN — ROCURONIUM BROMIDE 50 MG: 10 INJECTION INTRAVENOUS at 12:08

## 2022-10-24 RX ADMIN — PHENYLEPHRINE HYDROCHLORIDE 100 MCG: 10 INJECTION, SOLUTION INTRAMUSCULAR; INTRAVENOUS; SUBCUTANEOUS at 12:34

## 2022-10-24 RX ADMIN — HYDROMORPHONE HYDROCHLORIDE 0.25 MG: 1 INJECTION, SOLUTION INTRAMUSCULAR; INTRAVENOUS; SUBCUTANEOUS at 14:31

## 2022-10-24 RX ADMIN — SODIUM CHLORIDE, PRESERVATIVE FREE 10 ML: 5 INJECTION INTRAVENOUS at 14:34

## 2022-10-24 RX ADMIN — SODIUM CHLORIDE, SODIUM LACTATE, POTASSIUM CHLORIDE, AND CALCIUM CHLORIDE: .6; .31; .03; .02 INJECTION, SOLUTION INTRAVENOUS at 23:36

## 2022-10-24 RX ADMIN — LEVOFLOXACIN 500 MG: 5 INJECTION, SOLUTION INTRAVENOUS at 11:08

## 2022-10-24 RX ADMIN — ACETAMINOPHEN 1000 MG: 500 TABLET ORAL at 09:15

## 2022-10-24 RX ADMIN — METRONIDAZOLE 500 MG: 500 INJECTION, SOLUTION INTRAVENOUS at 11:27

## 2022-10-24 RX ADMIN — HYDROMORPHONE HYDROCHLORIDE 0.25 MG: 1 INJECTION, SOLUTION INTRAMUSCULAR; INTRAVENOUS; SUBCUTANEOUS at 14:40

## 2022-10-24 RX ADMIN — DEXMEDETOMIDINE HYDROCHLORIDE 4 MCG: 100 INJECTION, SOLUTION INTRAVENOUS at 11:54

## 2022-10-24 RX ADMIN — HYDROMORPHONE HYDROCHLORIDE 0.25 MG: 1 INJECTION, SOLUTION INTRAMUSCULAR; INTRAVENOUS; SUBCUTANEOUS at 17:05

## 2022-10-24 RX ADMIN — HYDROMORPHONE HYDROCHLORIDE 2 MG: 2 INJECTION, SOLUTION INTRAMUSCULAR; INTRAVENOUS; SUBCUTANEOUS at 13:10

## 2022-10-24 RX ADMIN — ROCURONIUM BROMIDE 5 MG: 10 INJECTION INTRAVENOUS at 11:17

## 2022-10-24 RX ADMIN — DEXMEDETOMIDINE HYDROCHLORIDE 4 MCG: 100 INJECTION, SOLUTION INTRAVENOUS at 11:17

## 2022-10-24 RX ADMIN — SODIUM CHLORIDE, POTASSIUM CHLORIDE, SODIUM LACTATE AND CALCIUM CHLORIDE: 600; 310; 30; 20 INJECTION, SOLUTION INTRAVENOUS at 12:38

## 2022-10-24 RX ADMIN — OXYCODONE 5 MG: 5 TABLET ORAL at 23:32

## 2022-10-24 RX ADMIN — Medication 140 MG: at 11:19

## 2022-10-24 ASSESSMENT — PAIN SCALES - GENERAL
PAINLEVEL_OUTOF10: 2
PAINLEVEL_OUTOF10: 0
PAINLEVEL_OUTOF10: 3
PAINLEVEL_OUTOF10: 0
PAINLEVEL_OUTOF10: 2
PAINLEVEL_OUTOF10: 4
PAINLEVEL_OUTOF10: 2
PAINLEVEL_OUTOF10: 5
PAINLEVEL_OUTOF10: 5
PAINLEVEL_OUTOF10: 0
PAINLEVEL_OUTOF10: 5
PAINLEVEL_OUTOF10: 5
PAINLEVEL_OUTOF10: 6

## 2022-10-24 ASSESSMENT — PAIN DESCRIPTION - ORIENTATION
ORIENTATION: RIGHT
ORIENTATION: LEFT
ORIENTATION: RIGHT

## 2022-10-24 ASSESSMENT — PAIN DESCRIPTION - LOCATION
LOCATION: SHOULDER
LOCATION: ABDOMEN

## 2022-10-24 ASSESSMENT — PAIN DESCRIPTION - DESCRIPTORS
DESCRIPTORS: DISCOMFORT
DESCRIPTORS: ACHING
DESCRIPTORS: DISCOMFORT
DESCRIPTORS: ACHING
DESCRIPTORS: ACHING
DESCRIPTORS: DISCOMFORT

## 2022-10-24 ASSESSMENT — ENCOUNTER SYMPTOMS: SHORTNESS OF BREATH: 1

## 2022-10-24 ASSESSMENT — PAIN - FUNCTIONAL ASSESSMENT: PAIN_FUNCTIONAL_ASSESSMENT: 0-10

## 2022-10-24 NOTE — BRIEF OP NOTE
Brief Postoperative Note      Patient: Colonel Root  YOB: 1945  MRN: [id number]    Date of Procedure: 10/24/2022    Pre-Op Diagnosis: Cancer of descending colon ([address] [C18.6]    Post-Op Diagnosis: Same       Procedure(s):  LAPAROSCOPIC LEFT COLECTOMY  LAPAROSCOPIC REDUCTION OF INTUSSUSCEPTION WITH FIXATION    Surgeon(s):  MD Tomás Felipe DO    Assistant:  Surgical Assistant: Familia Lux    Anesthesia: General    Estimated Blood Loss (mL): 30    Complications: None    Specimens:   ID Type Source Tests Collected by Time Destination   A : A. LEFT COLECTOMY Tissue Tissue SURGICAL PATHOLOGY Troy Ravi MD 10/24/2022 1309        Implants: None      Drains:   Urinary Catheter 10/24/22 Villegas (Active)       Findings: Omental adhesions to anterior abdominal wall requiring adhesiolysis. J-J anastomosis patent, stay sutures x2 placed. Descending colon mass visible with tattoo. Mass confirmed present in specimen. Stapled side-to-side functional end-to-ed colo-colo anastomosis widely patent and hemostatic, without twisting of mesentery.       Electronically signed by Toma Samano DO on 10/24/2022 at 1:53 PM

## 2022-10-24 NOTE — ANESTHESIA PROCEDURE NOTES
Peripheral Block    Patient location during procedure: OR  Reason for block: procedure for pain, post-op pain management and at surgeon's request  Start time: 10/24/2022 1:45 PM  End time: 10/24/2022 1:54 PM  Staffing  Anesthesiologist: Rocio Lowe DO  Preanesthetic Checklist  Completed: patient identified, IV checked, site marked, risks and benefits discussed, surgical/procedural consents, equipment checked, pre-op evaluation, timeout performed, anesthesia consent given, oxygen available, monitors applied/VS acknowledged, fire risk safety assessment completed and verbalized and blood product R/B/A discussed and consented  Peripheral Block   Patient position: supine  Prep: ChloraPrep  Provider prep: mask  Patient monitoring: cardiac monitor, continuous pulse ox, continuous capnometry, frequent blood pressure checks, IV access and oxygen  Block type: TAP  Laterality: bilateral  Injection technique: single-shot  Guidance: ultrasound guided    Needle   Needle type: insulated echogenic nerve stimulator needle   Needle gauge: 20 G  Needle localization: ultrasound guidance  Needle length: 8 cm  Assessment   Injection assessment: negative aspiration for heme, no paresthesia on injection, local visualized surrounding nerve on ultrasound and no intravascular symptoms  Paresthesia pain: none  Slow fractionated injection: yes  Hemodynamics: stable  Real-time US image taken/store: yes  Outcomes: uncomplicated    Additional Notes  0.25%-30mL  10mL-exparel

## 2022-10-24 NOTE — PROGRESS NOTES
Pt meets criteria for transfer to floor, room not available. Pain \"it's much better now. \", resp reg and easy, states her nausea is much better now. Vital signs stable    Will place in Clinical discharge at this time and change VS to every 1 hour and assessment every 2 hours.

## 2022-10-24 NOTE — PROGRESS NOTES
General Surgery  Post-operative Note      Procedure(s) Performed: laparoscopic L colectomy, laparoscopic reduction of intussusception with fixation    Subjective:   Patient's pain is controlled. Tolerating water without nausea or vomiting. Villegas in place. Denies flatus or BM at this time. Objective:  Anesthesia type: General      I/O    Intra op    Post op     Fluids  3397 mL 0 mL     EBL 10 mL 0 mL     Urine 250 mL 140 mL     Vitals:   Vitals:    10/24/22 1715 10/24/22 1730 10/24/22 1745 10/24/22 1800   BP: 124/71 118/60 (!) 112/57 124/69   Pulse: 58 62 60 65   Resp: 14 16 16 20   Temp:       TempSrc:       SpO2: 95% 94% 95% 98%   Weight:       Height:           Physical Exam:  Post-op vital signs: Stable   General appearance: Alert, no acute distress, grooming appropriate  Eyes: No scleral icterus, EOM grossly intact  Neck: Trachea midline, no JVD, no lymphadenopathy, neck supple  Chest/Lungs: Normal effort with no accessory muscle use on RA  Cardiovascular: RRR, well-perfused  Abdomen: Soft, appropriately-tender, incision c/d/i and well approximated with Dermabond  Skin: Warm and dry, no rashes  Extremities: No edema, no cyanosis  Genitourinary: Villegas in place with clear yellow urine  Neuro: A&Ox3, no focal deficits, sensation intact    Assessment and Plan  This is a 76y.o. year old female status post laparoscopic L colectomy, laparoscopic reduction of intussusception with fixation secondary to cancer of descending colon. (10/24) POD0.     Pain management: Roxicodone pain panel, Dilaudid pain panel, and PRN tylenol PRN  Cardiovasc: Hemodynamically stable, will continue to monitor  Respiratory: IS ordered to bedside, encourage hourly IS and deep breathing, wean oxygen as tolerated  Fluids:  NS at 100 cc/hr, Diet: General diet  : Villegas to stay overnight  Ambulation: OOB to chair, encourage ambulation  Prophylaxis: SCDs, Lovenox  Antibiotics: None  Wound: Local wound care      Epimenio Fresh, DO, [address]  PGY1, General Surgery  10/24/22  6:52 PM  [employer]  Pager: [phone]

## 2022-10-24 NOTE — PROGRESS NOTES
Pt received from OR to PACU # 11 via bed. Post:  Partial colectomy with laprascopic reduction of intussusception with fixation. Report received from Dr. Tavo Gutierres and [address] Per report pt did well, and received only a small amount of Marcin. Respirations reg and easy. Pt is alert. Attached to PACU monitoring system. Alarms and parameters set    Pain none noted and no complaints of nausea.

## 2022-10-24 NOTE — H&P
Start Date End Date Taking? Authorizing Provider   cyanocobalamin 1000 MCG tablet Take 1,000 mcg by mouth daily    Historical Provider, MD   metroNIDAZOLE (FLAGYL) 500 MG tablet Take one tablet by mouth 3 times on the day prior to surgery. Take one tablet at 1pm, 2pm and 9pm.  Patient not taking: No sig reported 10/18/22   Gale Ramos MD   neomycin (MYCIFRADIN) 500 MG tablet Take two tablets 3 times the day before surgery.  Take two tablets at 1pm, two tablets at 2pm and two tablets at 9pm.  Patient not taking: No sig reported 10/18/22   Gale Ramos MD   ondansetron (ZOFRAN ODT) 4 MG disintegrating tablet Place 1 tablet under the tongue every 8 hours as needed for Nausea or Vomiting  Patient not taking: No sig reported 10/18/22   Gale Ramos MD   furosemide (LASIX) 40 MG tablet TAKE 1 TABLET BY MOUTH DAILY 10/7/22 1/5/23  Leida Acosta APRN - CNP   ipratropium (ATROVENT) 0.06 % nasal spray USE 2 SPRAYS IN EACH NOSTRIL IN THE MORNING, 2 SPRAYS AT NOON, 2 SPRAYS IN THE EVENING, AND 2 SPRAYS BEFORE BEDTIME 9/28/22   Marquis Yulissa MD   midodrine (PROAMATINE) 2.5 MG tablet Take 5 mg by mouth in the morning and at bedtime    Historical Provider, MD   aspirin 81 MG EC tablet Take 81 mg by mouth daily    Historical Provider, MD   digoxin (LANOXIN) 125 MCG tablet Take 125 mcg by mouth daily    Historical Provider, MD   escitalopram (LEXAPRO) 10 MG tablet Take 1 tablet by mouth daily 3/28/22   Salazar Ivy MD   fluticasone [facility]) 50 MCG/ACT nasal spray 1 spray by Each Nostril route 2 times daily 12/22/21   Marquis Yulissa MD         Allergies:  Seasonal    PHYSICAL EXAM:      /76   Pulse 87   Temp 96.8 °F (36 °C) (Temporal)   Resp 14   Ht 5' 6\" (1.676 m)   Wt 180 lb 9.6 oz (81.9 kg)   SpO2 96%   BMI 29.15 kg/m²      Airway:  Airway patent with no audible stridor    Heart:  Regular rate and rhythm, No murmur noted    Lungs:  No increased work of breathing, good air exchange, clear to auscultation bilaterally, no crackles or wheezing    Abdomen:  Soft, non-distended, non-tender, no rebound tenderness or guarding, and no masses palpated    ASSESSMENT AND PLAN     Patient is a 76 y.o. female with above specified procedure planned. 1.  The patients history and physical was obtained and signed off by the pre-admission testing department. Patient seen and focused exam done today- no new changes since last physical exam on 10/19/22    2. Access to ancillary services are available per request of the provider.     SAURAV Husain - VICENTE     10/24/2022

## 2022-10-24 NOTE — ANESTHESIA POSTPROCEDURE EVALUATION
Department of Anesthesiology  Postprocedure Note    Patient: Subhash Becerra  MRN: [id number]  YOB: 1945  Date of evaluation: 10/24/2022      Procedure Summary     Date: 10/24/22 Room / Location: [address] [place] [address]    Anesthesia Start: 1112 Anesthesia Stop:     Procedures:       LAPAROSCOPIC LEFT COLECTOMY      LAPAROSCOPIC REDUCTION OF INTUSSUSCEPTION WITH FIXATION Diagnosis:       Cancer of descending colon ([address]      (Cancer of descending colon ([address] [C18.6])    Surgeons: Susan Russ MD; Angella Khoury DO Responsible Provider: Chantel Carlson DO    Anesthesia Type: general ASA Status: 3          Anesthesia Type: No value filed.     Anthony Phase I: Anthony Score: 10    Anthony Phase II:        Anesthesia Post Evaluation    Patient location during evaluation: PACU  Patient participation: complete - patient participated  Level of consciousness: awake  Pain score: 0  Airway patency: patent  Nausea & Vomiting: no nausea and no vomiting  Complications: no  Cardiovascular status: blood pressure returned to baseline  Respiratory status: acceptable  Hydration status: euvolemic  Multimodal analgesia pain management approach

## 2022-10-24 NOTE — ANESTHESIA PRE PROCEDURE
Department of Anesthesiology  Preprocedure Note       Name:  Connor Valentin   Age:  76 y.o.  :  1945                                          MRN:  [id number]         Date:  10/24/2022      Surgeon: Konrad Sarah):  MD Moira Feliz Pieter Saenredamsjorg[address], DO    Procedure: Procedure(s):  ROBOTIC OR LAPAROSCOPIC PARTIAL COLECTOMY, DESCENDING VERSUS SIGMOID, POSSIBLE OPEN  ROBOTIC REDUCTION OF INTUSSUSCEPTION, POSSIBLE REVISION OF PREVIOUS GASTRIC BYPASS    Medications prior to admission:   Prior to Admission medications    Medication Sig Start Date End Date Taking? Authorizing Provider   cyanocobalamin 1000 MCG tablet Take 1,000 mcg by mouth daily    Historical Provider, MD   metroNIDAZOLE (FLAGYL) 500 MG tablet Take one tablet by mouth 3 times on the day prior to surgery. Take one tablet at 1pm, 2pm and 9pm.  Patient not taking: Reported on 10/19/2022 10/18/22   Bard Bri MD   neomycin (MYCIFRADIN) 500 MG tablet Take two tablets 3 times the day before surgery.  Take two tablets at 1pm, two tablets at 2pm and two tablets at 9pm.  Patient not taking: Reported on 10/19/2022 10/18/22   Bard Bri MD   ondansetron (ZOFRAN ODT) 4 MG disintegrating tablet Place 1 tablet under the tongue every 8 hours as needed for Nausea or Vomiting  Patient not taking: Reported on 10/19/2022 10/18/22   Bard Bri MD   furosemide (LASIX) 40 MG tablet TAKE 1 TABLET BY MOUTH DAILY 10/7/22 1/5/23  Leida Acosta, APRN - CNP   ipratropium (ATROVENT) 0.06 % nasal spray USE 2 SPRAYS IN EACH NOSTRIL IN THE MORNING, 2 SPRAYS AT NOON, 2 SPRAYS IN THE EVENING, AND 2 SPRAYS BEFORE BEDTIME 22   Mami Ly MD   midodrine (PROAMATINE) 2.5 MG tablet Take 5 mg by mouth in the morning and at bedtime    Historical Provider, MD   aspirin 81 MG EC tablet Take 81 mg by mouth daily  Patient not taking: Reported on 10/19/2022    Historical Provider, MD   digoxin (LANOXIN) 125 MCG tablet Take 125 mcg by mouth daily Historical Provider, MD   escitalopram (LEXAPRO) 10 MG tablet Take 1 tablet by mouth daily 3/28/22   Annita Peralta MD   fluticasone [facility]) 50 MCG/ACT nasal spray 1 spray by Each Nostril route 2 times daily 12/22/21   Mayra Martínez MD       Current medications:    Current Facility-Administered Medications   Medication Dose Route Frequency Provider Last Rate Last Admin    lactated ringers infusion   IntraVENous Continuous Elyn DO Mark        sodium chloride flush 0.9 % injection 5-40 mL  5-40 mL IntraVENous 2 times per day Kaitlyn Bass MD        sodium chloride flush 0.9 % injection 5-40 mL  5-40 mL IntraVENous PRN Kaitlyn Bass MD        0.9 % sodium chloride infusion   IntraVENous PRN Kaitlyn Bass MD        enoxaparin (LOVENOX) injection 40 mg  40 mg SubCUTAneous Once Kaitlyn Bass MD        metronidazole (FLAGYL) 500 mg in 0.9% NaCl 100 mL IVPB premix  500 mg IntraVENous On Call to Via Daria Cao MD        And    levoFLOXacin (LEVAQUIN) 500 MG/100ML infusion 500 mg  500 mg IntraVENous On Call to Via Daria Cao MD           Allergies: Allergies   Allergen Reactions    Seasonal        Problem List:    Patient Active Problem List   Diagnosis Code    Depression F32. A    Anxiety F41.9    S/P gastric bypass Z98.84    Iron deficiency anemia D50.9    GERD (gastroesophageal reflux disease) K21.9    Fatigue R53.83    Lower back pain M54.50    Colon polyp K63.5    Preop exam for internal medicine Z01.818    Ex-smoker Z87.891    Urinary frequency R35.0    Stenosis of left external auditory canal H61.302    Stress fracture of cervical vertebra with delayed healing M48. 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    Chronic systolic (congestive) heart failure I50.22    Gastrointestinal hemorrhage K92.2       Past Medical History:        Diagnosis Date    A-fib [facility])     Age-related osteoporosis without current pathological fracture 2019    Anxiety     Cancer ([address]     colon    CHF (congestive heart failure) (HCC)     Colon polyp     Depression     History of blood transfusion     Hypoglycemia     Low blood pressure     Vitamin B12 deficiency     Wears dentures     full set    Wears glasses        Past Surgical History:        Procedure Laterality Date    APPENDECTOMY      CERVICAL FUSION N/A 2019    C1-2 FUSION POSTERIOR performed by Koby Silva MD at [facility] 5  2012    q 5    COLONOSCOPY N/A 10/11/2022    COLONOSCOPY WITH BIOPSY performed by Tino Redd MD at [facility] [place] N/A 10/11/2022    COLONOSCOPY SUBMUCOSAL/spot  INJECTION performed by Tino Redd MD at [address] [facility] [address] (CERVIX STATUS UNKNOWN)      TONSILLECTOMY      UPPER GASTROINTESTINAL ENDOSCOPY N/A 10/11/2022    EGD BIOPSY performed by Tino Redd MD at [facility] ENDOSCOPY       Social History:    Social History     Tobacco Use    Smoking status: Former     Packs/day: 1.00     Years: 30.00     Pack years: 30.00     Types: Cigarettes     Start date: 10/10/1974     Quit date: 10/10/2004     Years since quittin.0    Smokeless tobacco: Never    Tobacco comments:     15 years ago   Substance Use Topics    Alcohol use: Yes     Comment: occas                                Counseling given: Not Answered  Tobacco comments: 15 years ago      Vital Signs (Current):   Vitals:    10/20/22 1449 10/24/22 0857   BP:  123/76   Pulse:  87   Resp:  14   Temp:  96.8 °F (36 °C)   TempSrc:  Temporal   SpO2:  96%   Weight: 184 lb (83.5 kg) 180 lb 9.6 oz (81.9 kg)   Height: 5' 6\" (1.676 m) 5' 6\" (1.676 m) BP Readings from Last 3 Encounters:   10/24/22 123/76   10/19/22 120/78   10/13/22 121/70       NPO Status:                                                                                 BMI:   Wt Readings from Last 3 Encounters:   10/24/22 180 lb 9.6 oz (81.9 kg)   10/19/22 187 lb (84.8 kg)   10/13/22 184 lb (83.5 kg)     Body mass index is 29.15 kg/m². CBC:   Lab Results   Component Value Date/Time    WBC 5.3 10/03/2022 02:14 PM    RBC 4.40 10/03/2022 02:14 PM    HGB 9.7 10/03/2022 02:14 PM    HCT 31.1 10/03/2022 02:14 PM    MCV 70.7 10/03/2022 02:14 PM    RDW 28.2 10/03/2022 02:14 PM     10/03/2022 02:14 PM       CMP:   Lab Results   Component Value Date/Time     02/17/2022 04:45 AM    K 3.9 02/17/2022 04:45 AM     02/17/2022 04:45 AM    CO2 26 02/17/2022 04:45 AM    BUN 20 02/17/2022 04:45 AM    CREATININE 0.6 10/14/2022 06:40 PM    CREATININE 0.8 02/17/2022 04:45 AM    GFRAA >60 10/14/2022 06:40 PM    GFRAA >60 08/09/2012 11:15 AM    AGRATIO 1.7 03/02/2020 12:04 PM    LABGLOM >60 10/14/2022 06:40 PM    GLUCOSE 103 02/17/2022 04:45 AM    PROT 7.1 03/02/2020 12:04 PM    PROT 6.5 08/09/2012 11:15 AM    CALCIUM 9.5 02/17/2022 04:45 AM    BILITOT <0.2 03/02/2020 12:04 PM    ALKPHOS 85 03/02/2020 12:04 PM    AST 19 03/02/2020 12:04 PM    ALT 10 03/02/2020 12:04 PM       POC Tests: No results for input(s): POCGLU, POCNA, POCK, POCCL, POCBUN, POCHEMO, POCHCT in the last 72 hours.     Coags:   Lab Results   Component Value Date/Time    PROTIME 11.5 11/07/2019 10:15 AM    INR 1.01 11/07/2019 10:15 AM    APTT 30.9 11/07/2019 10:15 AM       HCG (If Applicable): No results found for: PREGTESTUR, PREGSERUM, HCG, HCGQUANT     ABGs: No results found for: PHART, PO2ART, MLF9ASY, ATC6CLB, BEART, X8LGPIUD     Type & Screen (If Applicable):  No results found for: LABABO, LABRH    Drug/Infectious Status (If Applicable):  No results found for: HIV, HEPCAB    COVID-19 Screening (If Applicable):   Lab Results Component Value Date/Time    COVID19 Not Detected 12/01/2021 12:48 PM           Anesthesia Evaluation  Patient summary reviewed and Nursing notes reviewed no history of anesthetic complications:   Airway: Mallampati: II  TM distance: >3 FB   Neck ROM: full  Mouth opening: > = 3 FB   Dental: normal exam         Pulmonary:normal exam    (+) shortness of breath:                             Cardiovascular:  Exercise tolerance: good (>4 METS),   (+) dysrhythmias: atrial fibrillation, CHF:, CRAVEN:,       NYHA Classification: II    Rhythm: regular  Rate: normal                 ROS comment:  Summary   Normal left ventricular size. Septal hypertrophy. Mild posterior wall hypertrophy. Mildly decreased left ventricular systolic function with an estimated   ejection fraction of 45%. Diffuse hypokinesis. Grade I diastolic dysfunction with normal LV filling pressures. Mitral annular calcification is present. Mildly calcified aortic valve. Trivial tricuspid regurgitation. The pulmonic valve is not well visualized. Trivial pulmonic regurgitation present. Estimated pulmonary artery systolic pressure is at 20 mmHg assuming a right   atrial pressure of 3 mmHg. Neuro/Psych:   (+) psychiatric history:            GI/Hepatic/Renal:   (+) GERD:,           Endo/Other:                     Abdominal:         (-) obese Abdomen: soft. Vascular: Other Findings:           Anesthesia Plan      general     ASA 3       Induction: intravenous. MIPS: Postoperative opioids intended and Prophylactic antiemetics administered. Anesthetic plan and risks discussed with patient. Plan discussed with CRNA and attending.                     Susy Jose DO   10/24/2022

## 2022-10-25 LAB
ALBUMIN SERPL-MCNC: 3.3 G/DL (ref 3.4–5)
ANION GAP SERPL CALCULATED.3IONS-SCNC: 7 MMOL/L (ref 3–16)
BASOPHILS ABSOLUTE: 0 K/UL (ref 0–0.2)
BASOPHILS RELATIVE PERCENT: 0.4 %
BUN BLDV-MCNC: 6 MG/DL (ref 7–20)
CALCIUM SERPL-MCNC: 8.7 MG/DL (ref 8.3–10.6)
CHLORIDE BLD-SCNC: 101 MMOL/L (ref 99–110)
CO2: 25 MMOL/L (ref 21–32)
CREAT SERPL-MCNC: 0.6 MG/DL (ref 0.6–1.2)
EOSINOPHILS ABSOLUTE: 0 K/UL (ref 0–0.6)
EOSINOPHILS RELATIVE PERCENT: 0.1 %
GFR SERPL CREATININE-BSD FRML MDRD: >60 ML/MIN/{1.73_M2}
GLUCOSE BLD-MCNC: 111 MG/DL (ref 70–99)
HCT VFR BLD CALC: 28.8 % (ref 36–48)
HEMOGLOBIN: 9 G/DL (ref 12–16)
LYMPHOCYTES ABSOLUTE: 1.2 K/UL (ref 1–5.1)
LYMPHOCYTES RELATIVE PERCENT: 16.6 %
MAGNESIUM: 1.8 MG/DL (ref 1.8–2.4)
MCH RBC QN AUTO: 22.8 PG (ref 26–34)
MCHC RBC AUTO-ENTMCNC: 31.2 G/DL (ref 31–36)
MCV RBC AUTO: 73.2 FL (ref 80–100)
MONOCYTES ABSOLUTE: 0.5 K/UL (ref 0–1.3)
MONOCYTES RELATIVE PERCENT: 7.2 %
NEUTROPHILS ABSOLUTE: 5.3 K/UL (ref 1.7–7.7)
NEUTROPHILS RELATIVE PERCENT: 75.7 %
PDW BLD-RTO: 26.8 % (ref 12.4–15.4)
PHOSPHORUS: 3.3 MG/DL (ref 2.5–4.9)
PLATELET # BLD: 282 K/UL (ref 135–450)
PMV BLD AUTO: 7.1 FL (ref 5–10.5)
POTASSIUM SERPL-SCNC: 4 MMOL/L (ref 3.5–5.1)
RBC # BLD: 3.94 M/UL (ref 4–5.2)
SODIUM BLD-SCNC: 133 MMOL/L (ref 136–145)
WBC # BLD: 7.1 K/UL (ref 4–11)

## 2022-10-25 PROCEDURE — 83735 ASSAY OF MAGNESIUM: CPT

## 2022-10-25 PROCEDURE — 36415 COLL VENOUS BLD VENIPUNCTURE: CPT

## 2022-10-25 PROCEDURE — 85025 COMPLETE CBC W/AUTO DIFF WBC: CPT

## 2022-10-25 PROCEDURE — 80069 RENAL FUNCTION PANEL: CPT

## 2022-10-25 PROCEDURE — 6360000002 HC RX W HCPCS: Performed by: STUDENT IN AN ORGANIZED HEALTH CARE EDUCATION/TRAINING PROGRAM

## 2022-10-25 PROCEDURE — 6360000002 HC RX W HCPCS: Performed by: NURSE PRACTITIONER

## 2022-10-25 PROCEDURE — 99024 POSTOP FOLLOW-UP VISIT: CPT | Performed by: SURGERY

## 2022-10-25 PROCEDURE — 1200000000 HC SEMI PRIVATE

## 2022-10-25 PROCEDURE — 2580000003 HC RX 258: Performed by: STUDENT IN AN ORGANIZED HEALTH CARE EDUCATION/TRAINING PROGRAM

## 2022-10-25 PROCEDURE — 6370000000 HC RX 637 (ALT 250 FOR IP): Performed by: STUDENT IN AN ORGANIZED HEALTH CARE EDUCATION/TRAINING PROGRAM

## 2022-10-25 RX ORDER — OXYCODONE HYDROCHLORIDE 5 MG/1
5 TABLET ORAL EVERY 6 HOURS PRN
Qty: 20 TABLET | Refills: 0 | Status: SHIPPED | OUTPATIENT
Start: 2022-10-25 | End: 2022-11-01

## 2022-10-25 RX ORDER — MAGNESIUM SULFATE IN WATER 40 MG/ML
2000 INJECTION, SOLUTION INTRAVENOUS ONCE
Status: COMPLETED | OUTPATIENT
Start: 2022-10-25 | End: 2022-10-25

## 2022-10-25 RX ORDER — DOCUSATE SODIUM 100 MG/1
100 CAPSULE, LIQUID FILLED ORAL 2 TIMES DAILY
Qty: 60 CAPSULE | Refills: 0 | Status: SHIPPED | OUTPATIENT
Start: 2022-10-25 | End: 2022-11-24

## 2022-10-25 RX ADMIN — IPRATROPIUM BROMIDE 2 SPRAY: 42 SPRAY, METERED NASAL at 09:47

## 2022-10-25 RX ADMIN — ENOXAPARIN SODIUM 40 MG: 100 INJECTION SUBCUTANEOUS at 09:42

## 2022-10-25 RX ADMIN — ACETAMINOPHEN 1000 MG: 500 TABLET ORAL at 09:42

## 2022-10-25 RX ADMIN — OXYCODONE 5 MG: 5 TABLET ORAL at 21:00

## 2022-10-25 RX ADMIN — MIDODRINE HYDROCHLORIDE 5 MG: 5 TABLET ORAL at 09:42

## 2022-10-25 RX ADMIN — ACETAMINOPHEN 1000 MG: 500 TABLET ORAL at 23:49

## 2022-10-25 RX ADMIN — MAGNESIUM SULFATE HEPTAHYDRATE 2000 MG: 40 INJECTION, SOLUTION INTRAVENOUS at 09:43

## 2022-10-25 RX ADMIN — SODIUM CHLORIDE, PRESERVATIVE FREE 10 ML: 5 INJECTION INTRAVENOUS at 21:01

## 2022-10-25 RX ADMIN — ESCITALOPRAM OXALATE 10 MG: 10 TABLET ORAL at 09:42

## 2022-10-25 RX ADMIN — DIGOXIN 125 MCG: 125 TABLET ORAL at 09:42

## 2022-10-25 RX ADMIN — FLUTICASONE PROPIONATE 1 SPRAY: 50 SPRAY, METERED NASAL at 09:47

## 2022-10-25 RX ADMIN — ACETAMINOPHEN 1000 MG: 500 TABLET ORAL at 05:44

## 2022-10-25 RX ADMIN — IPRATROPIUM BROMIDE 2 SPRAY: 42 SPRAY, METERED NASAL at 14:50

## 2022-10-25 ASSESSMENT — PAIN SCALES - GENERAL
PAINLEVEL_OUTOF10: 0
PAINLEVEL_OUTOF10: 4
PAINLEVEL_OUTOF10: 0
PAINLEVEL_OUTOF10: 5

## 2022-10-25 ASSESSMENT — PAIN DESCRIPTION - LOCATION
LOCATION: ABDOMEN
LOCATION: SHOULDER

## 2022-10-25 ASSESSMENT — PAIN DESCRIPTION - ORIENTATION: ORIENTATION: LEFT

## 2022-10-25 NOTE — PROGRESS NOTES
Villegas removed. Tolerated well. [facility] in [facility].

## 2022-10-25 NOTE — PROGRESS NOTES
PACU Transfer to [address]:    10/24/22 1956   BP: 128/75   Pulse: 92   Resp: 19   Temp: 97.9 °F (36.6 °C)   SpO2: 100%         Intake/Output Summary (Last 24 hours) at 10/24/2022 2004  Last data filed at 10/24/2022 1956  Gross per 24 hour   Intake 3802 ml   Output 590 ml   Net 3212 ml       Pain assessment:  none  Pain Level: 2    Patient transferred to care of Ryan Barcenas RN.

## 2022-10-25 NOTE — PROGRESS NOTES
Surgery Daily Progress Note  Gwen Boseleans  DX:  cancer of descending colon  Subjective : Went to OR overnight. Pain is tolerable. Using heating pads. No nausea. Has not been OOB. Objective    Infusions:   sodium chloride          I/O:I/O last 3 completed shifts: In: 3457 [P.O.:60; I.V.:3197; IV Piggyback:200]  Out: 400 [Urine:390; Blood:10]           Wt Readings from Last 1 Encounters:   10/24/22 180 lb 9.6 oz (81.9 kg)                 LABS:    Recent Labs     10/24/22  1000   WBC 4.1   HGB 9.9*   HCT 31.4*   MCV 71.5*           Recent Labs     10/24/22  1000      K 4.2      CO2 25   BUN 10   CREATININE 0.7             Exam:/73   Pulse 85   Temp (!) 96.7 °F (35.9 °C) (Oral)   Resp 16   Ht 5' 6\" (1.676 m)   Wt 180 lb 9.6 oz (81.9 kg)   SpO2 91%   BMI 29.15 kg/m²   General appearance: alert, appears stated age and cooperative  Lungs: clear to auscultation bilaterally  Heart: regular rate and rhythm, S1, S2   Abdomen: soft, appropriately-tender; non-distended  Incisions clean dry and intact  :  donovan with clear yellow urine      ASSESSMENT/PLAN: Pt. is a 76 y.o. female s/p Left colectomy, laparoscopic reduction of intussusception with fixation  Mobility:  OOB  Diet  Regular diet as tolerated  DVT prophylaxis: athrombic boots and lovenox  GI Prophylaxis:  none  D/c donovan today  Chronic diastolic CHF:  will monitor intake and output. Consider restarting lasix today. Transition to oral pain meds  Possible home today versus tomorrow        SAURAV Moran CNP 10/25/2022 6:32 AM  [phone]     Attending Supervising Physician's [address] Statement  I performed a history and physical examination on the patient and discussed the management with the nurse practitioner. I reviewed and agree with the findings and plan as documented in her note . Overall looks and feels good. Subjectively states she still feels dry.   Try regular diet today and keep fluids on for this

## 2022-10-25 NOTE — OP NOTE
[address], [address]                                OPERATIVE REPORT    PATIENT NAME: Emili Reed                   :        1945  MED REC NO:   [id number]                          ROOM:       9809  ACCOUNT NO:   [de-identified]                           ADMIT DATE: 10/24/2022  PROVIDER:     Benji Zhou. Darcy Yun MD    DATE OF PROCEDURE:  10/24/2022    SURGEON:  Benji Zhou. Darcy Yun MD    ASSISTANT:  Robert Islas, PGY-4 resident    PREOPERATIVE DIAGNOSIS:  Descending colon cancer. POSTOPERATIVE DIAGNOSIS:  Descending colon cancer. PROCEDURES:  1. Laparoscopic partial colectomy with colocolonic anastomosis. 2.  Laparoscopic mobilization of splenic flexure. ANESTHESIA:  General endotracheal.    COMPLICATIONS:  None. SPECIMEN:  Left colectomy. ESTIMATED BLOOD LOSS:  50 mL. INDICATIONS:  The patient is a 76-year-old female. She recently was  having bowel symptoms including abdominal pain and left sided back pain. She underwent colonoscopy and was found to have an endoscopically tight  stricture of the descending colon. Biopsies revealed adenocarcinoma. She underwent staging workup which showed no evidence of metastatic  disease. However, she was also incidentally noted to have from her  previous bypass surgery a very enlarged jejunostomy. I have Dr. Zcak Gonzalez of  Bariatric Surgery evaluate this CT scan, and he also decided to consent  her for possible revision of her anastomoses. I coordinated with him. As far as her colon, we discussed the plan for robotic laparoscopic and  open techniques. I discussed the risk of surgery including but not  limited to bleeding, infection, anastomotic leak, hernia, need for  additional operations, damage to intraabdominal structures including,  but not limited to, bowel, bladder, ureters as well as neurovascular  structures.   She understood the potential need for an open operation. She understood that given her previous open gastric bypass, she was at  higher risk for requiring open operation and potential damage at  the bypass that could require revision. She understood the risk and  agreed to proceed. PROCEDURE DETAILS:  The patient was brought to the operating theater,  placed supine on the operating table. General endotracheal intubation  was performed by Anesthesiology. The patient was placed in the supine  position. Both arms were padded and tucked. Villegas catheter was placed  revealing clear yellow urine. Her abdomen was prepped and draped in the  usual sterile fashion using chlorhexidine prep solution. Timeout was  performed confirming the patient's identity as well as the operative  site. Antibiotics were confirmed to be perfusing. All safety points  were followed. SCDs were on and functioning. Lovenox was confirmed  given. I began by making an incision in the left upper quadrant midclavicular  line at Bull's point. A 0-degree 5-mm laparoscope was used to enter  the abdominal cavity in a direct Optiview fashion. The abdomen was  entered without complication, insufflated to 15 mmHg. We were able to enter the abdomen successfully but I did note extensive  adhesions of the omentum to the upper midline consistent with a previous  open bypass. Additional port was placed in the left lower quadrant. Using these two ports in the LigaSure device, I was able to take down  the omentum from the midline incision. I took this all the way up and  noted adhesions to the liver, these were left alone. I was able to  inspect part of the liver and noted no obvious metastatic disease. I  also inspected and noted the tumor was at the descending colon, probably  about 45 to 50 cm from the anal verge.   At this point, Dr. Ronak Banks stepped  in and we decided to place additional ports including a right lower  quadrant 5-mm port, a right upper quadrant 5-mm port, and a midline  supraumbilical 5-mm port. He then used these ports to do his portion of  the procedure which was suturing around the jejunojejunal anastomosis,  this will be dictated separately by him. When Dr. Ronak Banks stepped out of the room, I then proceeded with the  colectomy. I first placed the patient in head down positioning, and  mobilized the proximal sigmoid colon off the lateral abdomen attachments  and continued in the lateral to medial fashion. I continued at the area  of the tattoo which was again in the descending colon just at the  inferior pole of the kidney. I continued dissecting all the way off the  Gerota's fascia and noted the tumor just proximal to the tattoo. I  continued along and noted we are now at the splenic flexure. I used the  LigaSure device to take splenocolic and omentocolic ligament. Then I  continue to the distal transverse colon. Additional postoperative  adhesions were also taken down with LigaSure in order to make sure I had  full mobilization. In this way, the splenic flexure was completely  mobilized. I have also mobilized enough proximal distal tumor that I  thought it would easily come up to the abdominal wall through the left  upper quadrant transverse incision. I then used the tumor scalpel to open the previous left upper quadrant  port site to proximally 2 inches in length. I dissected some tissue  from the Gerota's fascia in a transverse fashion. The abdominal muscle  was then spread and the posterior sheath/peritoneum was also incised in  a transverse fashion. A medium sized wound protector was then placed,  and the specimen was extracorporealized through this incision. I was  able to easily palpate the tumor, and chose at least 10 cm on either  side to circumferentially dissect around the colon. MAYRA-75 blue stapler  x2 was used in the proximal distal aspect as mentioned. I then noted  the distal aspect of the left colic artery.   The mesentery was taken  down until we reach this artery on both sides. I then transected with  the LigaSure device, high ligation of the left colic artery. Left  colectomy was performed. This was passed off to the back table. I  opened the specimen and noted very good gross margins on both sides and  good mesenteric resection as well. I then lined up for anastomosis. Fortunately, both sides appeared to be well perfused and hemostatic, and  there was no twisting, whatsoever. I was able to bring up both of them  fairly easily through the extraction incision. The corner of each  staple line was excised. MAYRA-75 blue load stapler was passed on either  end. Antimesenteric borders of the colon were then brought together,  and the staple was then fired creating a stapled side-to-side functional  end-to-end colocolonic anastomoses. Stapler was then removed noting  good intact hemostatic anastomosis. The common colotomy was then closed  using additional MAYRA-75 blue load stapler load. I then used several 3-0  Vicryl sutures to reinforce the anastomosis including at the apex/crotch  x2 as well as a couple of areas of bleeding from staple line. I  inspected the anastomosis, it was well perfused hemostatic, widely  patent and not twisted whatsoever. It was placed back in abdominal  cavity. I then closed the extraction site using 0 PDS for the posterior  sheath/peritoneum as well as the second layer of the anterior fascia. I  then went back laparoscopically to ensure there is very good fascial  closure and inspected anastomosis one more time. It laid nicely without  bleeding without twisting whatsoever. I was very happy how it looked,  appeared to be tension free. The main laparoscopic portals were then  removed under direct visualization. No bleeding was noted on the  abdominal wall. The abdomen was  desufflated. All incisions were then  thoroughly irrigated using saline.   The incision was then closed using  combination of 3-0 Vicryl, 4-0 Monocryl, and skin glue. The patient tolerated the procedure well. She was then extubated and  brought to the PACU after undergoing a TAP block. She tolerated well. All counts were correct. No complications. The patient's family was  updated on the operative findings.         Jose Ny MD    D: 10/24/2022 14:04:08       T: 10/25/2022 0:48:54     [email or web address]  Job#: [id number]     Doc#: [id number]    CC:

## 2022-10-25 NOTE — CARE COORDINATION
CM Note:  CM met with pt at bedside. Pt is independent in an apartment pta. Pt has a number of family members who all live within 3 miles of the pt's apartment and pt reports her upstairs neighbor is extremely helpful and supportive. Pt plans to return home at discharge and reports that she will have no CM needs at discharge. Pt's daughter and son-in-law will provide transportation home. Pt is not at high risk for readmission, has not recently been admitted, and there is no active consult for Case Management services.  will sign off on this pt at this time. If needs arise, please consult Case Management services.     Risk of Readmission Score 14%

## 2022-10-25 NOTE — PLAN OF CARE
Problem: Chronic Conditions and Co-morbidities  Goal: Patient's chronic conditions and co-morbidity symptoms are monitored and maintained or improved  Outcome: Progressing  Flowsheets (Taken 10/24/2022 2035)  Care Plan - Patient's Chronic Conditions and Co-Morbidity Symptoms are Monitored and Maintained or Improved: Monitor and assess patient's chronic conditions and comorbid symptoms for stability, deterioration, or improvement     Problem: Discharge Planning  Goal: Discharge to home or other facility with appropriate resources  Outcome: Progressing  Flowsheets (Taken 10/24/2022 2035)  Discharge to home or other facility with appropriate resources: Identify barriers to discharge with patient and caregiver     Problem: Pain  Goal: Verbalizes/displays adequate comfort level or baseline comfort level  Outcome: Progressing     Problem: Safety - Adult  Goal: Free from fall injury  Outcome: Progressing     Problem: ABCDS Injury Assessment  Goal: Absence of physical injury  Outcome: Progressing

## 2022-10-25 NOTE — PROGRESS NOTES
Bariatric Surgery   Daily Progress Note  Patient: Ruth Zpaien      CC: S/p laparoscopic L colectomy and reduction of intussusception with fixation    SUBJECTIVE:   Patient rested well overnight. Pain is well-controlled. Tolerating CLD overnight. Villegas still in place. Not passing gas or having bowel movements yet. Has not been OOB yet. IS up to 1500. Has appetite this AM.    ROS:   A 14 point review of systems was conducted, significant findings as noted above. All other systems negative. OBJECTIVE:    PHYSICAL EXAM:    Vitals:    10/25/22 0002 10/25/22 0037 10/25/22 0254 10/25/22 0315   BP:  128/73  123/73   Pulse:  87 83 85   Resp: 15 18  16   Temp:  97.4 °F (36.3 °C)  (!) 96.7 °F (35.9 °C)   TempSrc:  Oral  Oral   SpO2:  91%  91%   Weight:       Height:         General appearance: Alert, no acute distress, grooming appropriate  Eyes: No scleral icterus, EOM grossly intact  Neck: Trachea midline, no JVD, no lymphadenopathy, neck supple  Chest/Lungs: Normal effort with no accessory muscle use on RA  Cardiovascular: RRR, well-perfused  Abdomen: Soft, appropriately-tender, incision c/d/i and well approximated with Dermabond  Skin: Warm and dry, no rashes  Extremities: No edema, no cyanosis  Genitourinary: Villegas in place with clear yellow urine  Neuro: A&Ox3, no focal deficits, sensation intact    LABS:   Recent Labs     10/24/22  1000   WBC 4.1   HGB 9.9*   HCT 31.4*   MCV 71.5*           Recent Labs     10/24/22  1000      K 4.2      CO2 25   BUN 10   CREATININE 0.7      No results for input(s): AST, ALT, ALB, BILIDIR, BILITOT, ALKPHOS in the last 72 hours. No results for input(s): LIPASE, AMYLASE in the last 72 hours. No results for input(s): PROT, INR, APTT in the last 72 hours. No results for input(s): CKTOTAL, CKMB, CKMBINDEX, TROPONINI in the last 72 hours.       ASSESSMENT & PLAN:   This is a 76y.o. year old female status post laparoscopic L colectomy, and reduction of intussusception with fixation secondary to cancer of descending colon.  (10/24) POD1.    - Continue multimodal pain control  - Colorectal surgery following; holding Lasix this AM and SLIV later today  - Encourage OOB, ambulation  - Aggressive pulmonary toilet, incentive spirometry  - Continue DVT prophylaxis      Ashwin Bravo DO, [place]  PGY1, General Surgery  10/25/22  6:27 AM  [facility]  Pager: [phone]

## 2022-10-26 VITALS
DIASTOLIC BLOOD PRESSURE: 67 MMHG | RESPIRATION RATE: 16 BRPM | HEIGHT: 66 IN | OXYGEN SATURATION: 93 % | WEIGHT: 180.6 LBS | SYSTOLIC BLOOD PRESSURE: 116 MMHG | HEART RATE: 89 BPM | TEMPERATURE: 98.5 F | BODY MASS INDEX: 29.02 KG/M2

## 2022-10-26 LAB
ALBUMIN SERPL-MCNC: 3.3 G/DL (ref 3.4–5)
ANION GAP SERPL CALCULATED.3IONS-SCNC: 7 MMOL/L (ref 3–16)
BUN BLDV-MCNC: 7 MG/DL (ref 7–20)
CALCIUM SERPL-MCNC: 8.7 MG/DL (ref 8.3–10.6)
CHLORIDE BLD-SCNC: 108 MMOL/L (ref 99–110)
CO2: 28 MMOL/L (ref 21–32)
CREAT SERPL-MCNC: 0.7 MG/DL (ref 0.6–1.2)
GFR SERPL CREATININE-BSD FRML MDRD: >60 ML/MIN/{1.73_M2}
GLUCOSE BLD-MCNC: 92 MG/DL (ref 70–99)
HCT VFR BLD CALC: 26.7 % (ref 36–48)
HEMOGLOBIN: 8.3 G/DL (ref 12–16)
MAGNESIUM: 2 MG/DL (ref 1.8–2.4)
MCH RBC QN AUTO: 22.4 PG (ref 26–34)
MCHC RBC AUTO-ENTMCNC: 31.2 G/DL (ref 31–36)
MCV RBC AUTO: 72 FL (ref 80–100)
PDW BLD-RTO: 27.5 % (ref 12.4–15.4)
PHOSPHORUS: 3.2 MG/DL (ref 2.5–4.9)
PLATELET # BLD: 277 K/UL (ref 135–450)
PMV BLD AUTO: 6.6 FL (ref 5–10.5)
POTASSIUM SERPL-SCNC: 3.7 MMOL/L (ref 3.5–5.1)
RBC # BLD: 3.71 M/UL (ref 4–5.2)
SODIUM BLD-SCNC: 143 MMOL/L (ref 136–145)
WBC # BLD: 4.3 K/UL (ref 4–11)

## 2022-10-26 PROCEDURE — 83735 ASSAY OF MAGNESIUM: CPT

## 2022-10-26 PROCEDURE — 6370000000 HC RX 637 (ALT 250 FOR IP): Performed by: STUDENT IN AN ORGANIZED HEALTH CARE EDUCATION/TRAINING PROGRAM

## 2022-10-26 PROCEDURE — 99024 POSTOP FOLLOW-UP VISIT: CPT | Performed by: SURGERY

## 2022-10-26 PROCEDURE — 85027 COMPLETE CBC AUTOMATED: CPT

## 2022-10-26 PROCEDURE — 2580000003 HC RX 258: Performed by: STUDENT IN AN ORGANIZED HEALTH CARE EDUCATION/TRAINING PROGRAM

## 2022-10-26 PROCEDURE — 80069 RENAL FUNCTION PANEL: CPT

## 2022-10-26 PROCEDURE — 36415 COLL VENOUS BLD VENIPUNCTURE: CPT

## 2022-10-26 PROCEDURE — 6360000002 HC RX W HCPCS: Performed by: STUDENT IN AN ORGANIZED HEALTH CARE EDUCATION/TRAINING PROGRAM

## 2022-10-26 PROCEDURE — 6370000000 HC RX 637 (ALT 250 FOR IP): Performed by: NURSE PRACTITIONER

## 2022-10-26 RX ADMIN — FUROSEMIDE 40 MG: 40 TABLET ORAL at 09:50

## 2022-10-26 RX ADMIN — MIDODRINE HYDROCHLORIDE 5 MG: 5 TABLET ORAL at 09:50

## 2022-10-26 RX ADMIN — SODIUM CHLORIDE, PRESERVATIVE FREE 10 ML: 5 INJECTION INTRAVENOUS at 09:51

## 2022-10-26 RX ADMIN — ACETAMINOPHEN 1000 MG: 500 TABLET ORAL at 05:09

## 2022-10-26 RX ADMIN — DIGOXIN 125 MCG: 125 TABLET ORAL at 09:50

## 2022-10-26 RX ADMIN — ESCITALOPRAM OXALATE 10 MG: 10 TABLET ORAL at 09:51

## 2022-10-26 RX ADMIN — ENOXAPARIN SODIUM 40 MG: 100 INJECTION SUBCUTANEOUS at 09:50

## 2022-10-26 RX ADMIN — POTASSIUM BICARBONATE 20 MEQ: 782 TABLET, EFFERVESCENT ORAL at 09:50

## 2022-10-26 ASSESSMENT — PAIN SCALES - GENERAL: PAINLEVEL_OUTOF10: 0

## 2022-10-26 NOTE — OP NOTE
[facility] [address], [address]                                OPERATIVE REPORT    PATIENT NAME: Randy Elliott                   :        1945  MED REC NO:   [id number]                          ROOM:       5315  ACCOUNT NO:   [de-identified]                           ADMIT DATE: 10/24/2022  PROVIDER:     Lexy Moncada DO    DATE OF PROCEDURE:  10/24/2022    PREOPERATIVE DIAGNOSIS:  Jejunal anastomosis intussusception. POSTOPERATIVE DIAGNOSIS:  Jejunal anastomosis intussusception. PROCEDURE PERFORMED:  Reduction of intussusception with fixation. SURGEON:  Lexy Moncada DO    ASSISTANT:  Ines Giraldo DO, PGY-1.    ANESTHESIA:  General endotracheal.    COMPLICATIONS:  None. CONDITION:  Stable. INDICATIONS FOR PROCEDURE:  The patient is a 38-year-old female who was  scheduled for a partial colectomy with Dr. Sandra Miguel for a malignancy. On  CT scan, an incidental finding of a large intussusception of the  patient's previous jejunojejunostomy anastomosis from previous gastric  bypass was noted and concerning for partial bowel obstruction. After  discussion with the patient and understanding all risks, benefits, and  alternatives, the patient consented for reduction of this  intussusception with fixation to prevent obstruction. DESCRIPTION OF PROCEDURE:  The patient was already intubated. Ports  were already in place for Dr. Moctezuma Plscarlett procedure. Please see his note  for beginning of the operation. The small bowel was inspected and the jejunojejunostomy anastomosis  from the previous gastric bypass was identified. This was palpated  proximal and distal and no mass was identified. The intussusception was  then reduced along the length of its anastomosis in entirety. At that  point, 3-0 silk sutures were used to fixate the anastomosis at multiple  positions to prevent reintussusception.   A picture was taken before and  after the reduction and fixation of the intussusception. The patient  tolerated the procedure well and was brought to the postanesthesia care  unit in stable condition. Please see Dr. Maurisio leiva for the rest of the operation findings.         Carmelo Gil DO    D: 10/26/2022 6:04:03       T: 10/26/2022 6:06:27     [email or web address]  Job#: [id number]     Doc#: [id number]    CC:

## 2022-10-26 NOTE — PROGRESS NOTES
Surgery Daily Progress Note  Connor Valentin  DX:  cancer of descending colon    Subjective : Afebrile, HDS. No complaints, feeling good this morning. Tolerating diet. No nausea or vomiting. On 2L NC. Voiding, no flatus or bowel movement. Objective    Infusions:   sodium chloride          I/O:I/O last 3 completed shifts: In: 56 [P. O.:780; I.V.:3302; IV Piggyback:200]  Out: 1890 [Urine:1880; Blood:10]           Wt Readings from Last 1 Encounters:   10/24/22 180 lb 9.6 oz (81.9 kg)                 LABS:    Recent Labs     10/24/22  1000 10/25/22  0611   WBC 4.1 7.1   HGB 9.9* 9.0*   HCT 31.4* 28.8*   MCV 71.5* 73.2*    282          Recent Labs     10/24/22  1000 10/25/22  0611    133*   K 4.2 4.0    101   CO2 25 25   PHOS  --  3.3   BUN 10 6*   CREATININE 0.7 0.6               Exam:/62   Pulse 84   Temp 98.9 °F (37.2 °C) (Oral)   Resp 16   Ht 5' 6\" (1.676 m)   Wt 180 lb 9.6 oz (81.9 kg)   SpO2 93%   BMI 29.15 kg/m²   General appearance: alert, appears stated age and cooperative  Lungs: regular work of breathing, on 2L NC  Heart: regular rate and rhythm, S1, S2   Abdomen: soft, appropriately-tender; non-distended  Incisions clean dry and intact      ASSESSMENT/PLAN: Pt. is a 76 y.o. female s/p Left colectomy, laparoscopic reduction of intussusception with fixation 10/24.  POD#2    - Continue regular diet   - Encourage OOB ambulation   - Restarted home meds   - Dispo home today       Sana Chi MD  PGY1, General Surgery  10/26/22  6:47 AM  MCKAY#[phone]

## 2022-10-26 NOTE — PROGRESS NOTES
Pt a&o x4 for shift. Ambulating by foot w/steady gait. V/s stable and pt denies pain or nausea, tolerating oral diet. D/c instructions and prescriptions reviewed with pt. PIV removed intact w/o injury. Pt left unit in w/c, transportation home provided by grand daughter.

## 2022-10-26 NOTE — DISCHARGE SUMMARY
Physician Discharge Summary     Patient ID:  Corie Em  [id number]  75 y.o.  1945    Admit date: 10/24/2022    Discharge date and time: 10/26/2022 @  12    Admitting Physician: Netta Ely MD     Discharge Physician: Netta Ely MD     Admission Diagnoses: Cancer of descending colon [facility]) [C18.6]    Discharge Diagnoses: same as above     Admission Condition: good    Discharged Condition: good    Indication for Admission: Surgery:    PROCEDURES: 10/24/2022  1. Laparoscopic partial colectomy with colocolonic anastomosis. 2.  Laparoscopic mobilization of splenic flexure. Hospital Course: 76 y.o. with history of atrial fibrillation, CHF, cholecystectomy, gastric bypass surgery who was referred to Dr Michael Spangler for further evaluation of descending colon cancer. She presented with abdominal pain and left sided back pain and subsequent biopsy proven adenocarcinoma per colonoscopy 10/11/2022 with Dr Daniel Enciso. Staging work up revealed no evidence of metastatic disease. She was admitted 10/24/2022 for elective surgery and underwent Laparoscopic partial colectomy with colocolonic anastomosis, Laparoscopic mobilization of splenic flexure with Dr Michael Spangler. Surgery was uneventful and the patient was admitted to post-operative surgical floor in stable condition for post-operative care including IV hydration, monitoring and pain and nausea management. The following morning the pain was tolerable on po pain medication, was taking adequate po, voiding freely, and ambulatory. Lasix was resumed POD 2. The patient was discharged POD2 in stable condition. Treatments: surgery     Disposition:  home    Patient Instructions:    Activity: activity as tolerated and no driving while on analgesics  Diet: regular  Wound Care: as directed  Follow-up with Dr. Michael Spangler in 1-2 weeks for post-operative follow up and wound check     Signed:  SAURAV Boston CNP

## 2022-10-26 NOTE — PROGRESS NOTES
Pt oa4, surg pain is controlled w tylenol. Pt denied n/v,sob. Pt is placed to 2L O2 due to SaO2 dropped to 85% in RA. Educated pt to continue to use spirometer. Pt reported still no flatus nor bm since surgery. Surg sites CDI.  Will monitor

## 2022-10-26 NOTE — DISCHARGE INSTRUCTIONS
Discharge Instructions:    Diet:   You may resume a regular diet. Wound Care:   Skin glue was used to cover your incision(s). Please note that this glue is tinted purple; therefore, a slight purple hue around your incisions is normal. The skin glue will fall off on its own in about 10 days. You may shower, but do not scrub the incision sites directly or soak (tub, pool, etc.). Activity:   No heavy lifting greater than a milk jug (~8lbs) for 4-6 weeks. This recommendation is made to reduce your risk of developing an incisional hernia. You will receive further instruction at your follow-up appointment. Pain management:   Unless informed of any restrictions by your primary care physician, please use your preferred over-the-counter pain reliever as your primary pain medication. If you have pain that persists despite over-the-counter pain medications, you have been provided with a prescription for an opioid/narcotic pain reliever (Percocet). Be aware that this medication is a combination of opioid/narcotic and acetaminophen (the main ingredient in Tylenol); therefore, it will contribute to your daily limit of 4,000mg acetaminophen. No driving or operating machinery while taking opioid/narcotic medications. If you are not taking the opioid pain medication, then you can drive when you feel as though you can sit comfortably behind the steering wheel and can slam on the brake or turn the wheel sharply without it hurting. We recommend that you practice this while sitting the car with it parked in your driveway before trying to drive on the road. Bowel Regimen:   Opioid/Narcotic pain relievers have a common side effect of constipation; therefore, you have been provided with a prescription for a stool softener, Docusate (Colace).  Please note that this medication is available over-the-counter at the same dose as that available with a prescription; therefore, we recommend you making an informed decision regarding purchasing the medication by filling the prescription or simply purchasing over-the-counter instead. This medication is  intended to help prevent you from experiencing this very common side effect and also help to regulate your bowels after surgery. Accordingly, if you do not experience constipation, then you do not need to take this medication. Bowel Regimen Instructions: Take one Colace pill two times each day while you are taking the narcotic pain reliever. If your stools become too loose and/or frequent, decrease the Colace to one pill one time each day. If your stools are still loose after this modification, stop taking this medication all together. This medicine was prescribed with the intention of being an as-needed medicine; accordingly, you do not have to finish the prescription or use at all if you find that you do not need it. Reasons to Return:   Some soreness and redness is common after surgery, especially for the first 24-48 hours. If, however, you experience for increasing redness, worsening pain, new and/or increasing drainage from wound, fever above 101.5 degrees Farenheit, bleeding that does not stop soon after discovery, or any other concerns about your incision or post op course, please either call the office or call/return to the Emergency Department for further evaluation. Follow up with  in 1-2 weeks. Please call [phone] to schedule your appointment. We recommend that you call your primary care physician within the first 3-5 days following discharge to inform them that you were recently hospitalized and potentially schedule a visit at their discretion.

## 2022-10-27 ENCOUNTER — CARE COORDINATION (OUTPATIENT)
Dept: CASE MANAGEMENT | Age: 77
End: 2022-10-27

## 2022-10-27 DIAGNOSIS — C18.6 CANCER OF DESCENDING COLON (HCC): Primary | ICD-10-CM

## 2022-10-27 NOTE — RESULT ENCOUNTER NOTE
Spoke to her over the phone. She is recovering great from her surgery. Discussed pathology showing stage IIIa disease with 1 lymph node involved out of 19. She prefers [facility] for her oncology referral.  I will refer her to Dr. Georges Cordova. Briefly discussed Port-A-Cath placement in the next few weeks after she meets with Dr. Georges Cordova.

## 2022-10-27 NOTE — CARE COORDINATION
[facility] Transitions Initial Follow Up Call    Call within 2 business days of discharge: Yes    Patient:  Chelsea Funez   Patient :  1945  MRN:  [id number]     Reason for Admission: S/P Laparoscopic Colectomy  Discharge Date:  10/26/22    RARS:       Transitions of Care Initial Call    Was this an external facility discharge? Discharge Facility: [address] [place] [address] to be reviewed by the provider   Additional needs identified to be addressed with provider:    no    AMB CC Provider Discharge Needs: none            Non-face-to-face services provided:    1ST CTC attempt to reach Pt regarding recent hospital discharge. CTC left voice recording with call back number requesting a call back.     Follow up appointments:    Future Appointments   Date Time Provider Amor Freedman   2022 11:00 AM MD Renaldo [facility]     Thank Padmini Phan, RN  Care Transition Coordinator  Contact LFEQAS:[phone]

## 2022-10-28 ENCOUNTER — CARE COORDINATION (OUTPATIENT)
Dept: CASE MANAGEMENT | Age: 77
End: 2022-10-28

## 2022-10-28 NOTE — CARE COORDINATION
[facility] Care Transitions Initial Follow Up Call    Call within 2 business days of discharge: Yes    Care Transition Nurse contacted the patient by telephone to perform post hospital discharge assessment. Verified name and  with patient as identifiers. Provided introduction to self, and explanation of the Care Transition Nurse role. Patient: Michael Fess Patient :    MRN: [id number]   Reason for Admission: Laparoscopic Colectomy  Discharge Date: 10/26/22 RARS: Readmission Risk Score: 14.6      Last Discharge  Street       Date Complaint Diagnosis Description Type Department Provider    10/24/22  Acute postoperative pain of abdomen . .. Admission (Discharged) Cesar Leigh 5 Mary Conrad MD            Was this an external facility discharge? No     Challenges to be reviewed by the provider   Additional needs identified to be addressed with provider: Yes  Hospital Follow up appointment. Method of communication with provider: chart routing. CTN spoke with patient this afternoon for initial 24 hour discharge follow up CTN call. Patient states she is doing well, states she is not having any pain, if and when pain presents, she is only taking Tylenol and it is effective. Patient denies having any fever, chills, nausea, vomiting, chest pain, SOB or cough. Has not had a BM as of yet, but states she did pass a small mucous like BM on first day of returning home. CTN and Pt reviewed meds and CTN completed the 1111f. Care Transition Nurse reviewed discharge instructions, medical action plan, and red flags with patient who verbalized understanding. The patient was given an opportunity to ask questions and does not have any further questions or concerns at this time. Were discharge instructions available to patient? Yes.  Reviewed appropriate site of care based on symptoms and resources available to patient including: PCP  Specialist  Urgent care clinics  [facility]   When to call 911. The patient agrees to contact the PCP office for questions related to their healthcare. Advance Care Planning:   Does patient have an Advance Directive: not on file. Medication reconciliation was performed with patient, who verbalizes understanding of administration of home medications. Medications reviewed, 1111F entered: yes    Was patient discharged with a pulse oximeter? no    Non-face-to-face services provided:  Obtained and reviewed discharge summary and/or continuity of care documents  Education of patient/family/caregiver/guardian to support self-management-CTN encouraged patient to continue to monitor for any of the above noted s/s, also instructed to rest as needed and take all medications as prescribed. Patient instructed to also contact PCP office to see if she needs to schedule follow up with there office. Assessment and support for treatment adherence and medication management-Medication Review Completed with patient. Offered patient enrollment in the Remote Patient Monitoring (RPM) program for in-home monitoring:  CTN will discuss possible RPM program with patient on next call . Care Transitions 24 Hour Call    Schedule Follow Up Appointment with PCP: Declined  Do you have a copy of your discharge instructions?: Yes  Do you have all of your prescriptions and are they filled?: Yes  Have you been contacted by a [address]?: No  Have you scheduled your follow up appointment?: Yes  How are you going to get to your appointment?: Car - family or friend to transport  Do you have support at home?: Alone  Do you feel like you have everything you need to keep you well at home?: Yes  Are you an active caregiver in your home?: No  Care Transitions Interventions  No Identified Needs         Follow Up  Future Appointments   Date Time Provider Amor Freedman   11/8/2022 11:00 AM MD Rohini Ponce [facility]       Care Transition Nurse provided contact information.   Plan for follow-up call in 3-5 days based on severity of symptoms and risk factors. Plan for next call: Any issues or concerns that may arise, as well as scheduling follow up with PCP has been done.   Thank Brisa Diehl RN  Care Transition Coordinator  Contact AUGUST:[phone]

## 2022-11-02 ENCOUNTER — CARE COORDINATION (OUTPATIENT)
Dept: CASE MANAGEMENT | Age: 77
End: 2022-11-02

## 2022-11-02 ENCOUNTER — TELEPHONE (OUTPATIENT)
Dept: SURGERY | Age: 77
End: 2022-11-02

## 2022-11-02 NOTE — CARE COORDINATION
[facility] Care Transitions Follow Up Call    Care Transition Nurse contacted the patient by telephone to follow up after admission on 10/26/2022. Verified name and  with patient as identifiers. Patient: Luan Marshall  Patient :    MRN: [id number]   Reason for Admission: Laparoscopic Colectomy  Discharge Date: 10/26/22 RARS: Readmission Risk Score: 14.6      Needs to be reviewed by the provider   Additional needs identified to be addressed with provider: No  none             Method of communication with provider: none. CTN spoke with patient this afternoon for follow up CTN call. Patient states she is doing well, no reports of any fever, chills, nausea, vomiting, chest pain, SOB or cough. Incisions healing well, no congestion, pain, difficulty emptying bladder, LE edema, feeling lightheaded, dizziness, and heart palpitations. Patient has follow up with surgeon is scheduled for next week, PCP appointment scheduled for 2022. Addressed changes since last contact:  none  Discussed follow-up appointments. If no appointment was previously scheduled, appointment scheduling offered: Yes. Is follow up appointment scheduled within 7 days of discharge? Yes. Follow Up  Future Appointments   Date Time Provider Amor Freedman   2022 11:15 AM Rea Nguyễn MD [facility] [address]  [place] [facility]   2022  5:00 PM [facility] RM 2 [facility] [employer]   2022 11:00 AM Yvonne Harley MD [facility]       Care Transition Nurse reviewed red flags with patient and discussed any barriers to care and/or understanding of plan of care after discharge. Discussed appropriate site of care based on symptoms and resources available to patient including: PCP  Specialist  Urgent care clinics  [facility]   When to call 911. The patient agrees to contact the PCP office for questions related to their healthcare. Advance Care Planning:   not on file.      Patients top risk factors for readmission: medical condition-Laparoscopic Colectomy, medication management, multiple health system providers, and polypharmacy  Interventions to address risk factors: Education of patient/family/caregiver/guardian to support self-management-Patient instructed to continue to monitor for any of the above noted s/s, reporting any that may present to MD immediately. Offered patient enrollment in the Remote Patient Monitoring (RPM) program for in-home monitoring:  CTN will offer program on next CTN call . Care Transitions Subsequent and Final Call    Schedule Follow Up Appointment with PCP: Declined  Subsequent and Final Calls  Do you have any ongoing symptoms?: No  Have your medications changed?: No  Do you have any questions related to your medications?: No  Do you currently have any active services?: No  Do you have any needs or concerns that I can assist you with?: No  Identified Barriers: None  Care Transitions Interventions  No Identified Needs  Other Interventions:             Care Transition Nurse provided contact information for future needs. Plan for follow-up call in 3-5 days based on severity of symptoms and risk factors. Plan for next call: Any issues or concerns that may arise.     Thank Renata Carlisle RN  Care Transition Coordinator  Contact WSBIYR:[phone]

## 2022-11-02 NOTE — TELEPHONE ENCOUNTER
Patient has been scheduled for:    Procedure: Insert Port  Date: 11/28/22  Time: 1:00pm  Arrival: 11:00AM  [facility]  [facility]?: Aspirin 7 days   Prep? npo    Pre-op? Post-op Appt? Patient advised they will need a . Orders routed to surgery scheduling.     Instructions have been mailed/emailed to:  [address] [place] [address]. [address]  Antonieta William 3

## 2022-11-04 ENCOUNTER — OFFICE VISIT (OUTPATIENT)
Dept: FAMILY MEDICINE CLINIC | Age: 77
End: 2022-11-04
Payer: MEDICARE

## 2022-11-04 VITALS
TEMPERATURE: 97.2 F | OXYGEN SATURATION: 97 % | HEIGHT: 66 IN | BODY MASS INDEX: 29.22 KG/M2 | HEART RATE: 83 BPM | DIASTOLIC BLOOD PRESSURE: 66 MMHG | WEIGHT: 181.8 LBS | SYSTOLIC BLOOD PRESSURE: 100 MMHG

## 2022-11-04 DIAGNOSIS — R06.02 SOB (SHORTNESS OF BREATH): ICD-10-CM

## 2022-11-04 DIAGNOSIS — D64.9 ANEMIA, UNSPECIFIED TYPE: Primary | ICD-10-CM

## 2022-11-04 DIAGNOSIS — D64.9 ANEMIA, UNSPECIFIED TYPE: ICD-10-CM

## 2022-11-04 LAB
BASOPHILS ABSOLUTE: 0.1 K/UL (ref 0–0.2)
BASOPHILS RELATIVE PERCENT: 1 %
EOSINOPHILS ABSOLUTE: 0.3 K/UL (ref 0–0.6)
EOSINOPHILS RELATIVE PERCENT: 6 %
HCT VFR BLD CALC: 30.6 % (ref 36–48)
HEMOGLOBIN: 9.8 G/DL (ref 12–16)
LYMPHOCYTES ABSOLUTE: 1.9 K/UL (ref 1–5.1)
LYMPHOCYTES RELATIVE PERCENT: 35 %
MCH RBC QN AUTO: 22.7 PG (ref 26–34)
MCHC RBC AUTO-ENTMCNC: 31.9 G/DL (ref 31–36)
MCV RBC AUTO: 71.1 FL (ref 80–100)
MONOCYTES ABSOLUTE: 0.5 K/UL (ref 0–1.3)
MONOCYTES RELATIVE PERCENT: 9 %
NEUTROPHILS ABSOLUTE: 2.7 K/UL (ref 1.7–7.7)
NEUTROPHILS RELATIVE PERCENT: 49 %
PDW BLD-RTO: 26.8 % (ref 12.4–15.4)
PLATELET # BLD: 482 K/UL (ref 135–450)
PMV BLD AUTO: 7 FL (ref 5–10.5)
RBC # BLD: 4.31 M/UL (ref 4–5.2)
WBC # BLD: 5.5 K/UL (ref 4–11)

## 2022-11-04 PROCEDURE — 1124F ACP DISCUSS-NO DSCNMKR DOCD: CPT | Performed by: FAMILY MEDICINE

## 2022-11-04 PROCEDURE — 99213 OFFICE O/P EST LOW 20 MIN: CPT | Performed by: FAMILY MEDICINE

## 2022-11-04 SDOH — ECONOMIC STABILITY: FOOD INSECURITY: WITHIN THE PAST 12 MONTHS, YOU WORRIED THAT YOUR FOOD WOULD RUN OUT BEFORE YOU GOT MONEY TO BUY MORE.: NEVER TRUE

## 2022-11-04 SDOH — ECONOMIC STABILITY: FOOD INSECURITY: WITHIN THE PAST 12 MONTHS, THE FOOD YOU BOUGHT JUST DIDN'T LAST AND YOU DIDN'T HAVE MONEY TO GET MORE.: NEVER TRUE

## 2022-11-04 ASSESSMENT — PATIENT HEALTH QUESTIONNAIRE - PHQ9
1. LITTLE INTEREST OR PLEASURE IN DOING THINGS: 0
SUM OF ALL RESPONSES TO PHQ QUESTIONS 1-9: 0
9. THOUGHTS THAT YOU WOULD BE BETTER OFF DEAD, OR OF HURTING YOURSELF: 0
10. IF YOU CHECKED OFF ANY PROBLEMS, HOW DIFFICULT HAVE THESE PROBLEMS MADE IT FOR YOU TO DO YOUR WORK, TAKE CARE OF THINGS AT HOME, OR GET ALONG WITH OTHER PEOPLE: 0
5. POOR APPETITE OR OVEREATING: 0
2. FEELING DOWN, DEPRESSED OR HOPELESS: 0
6. FEELING BAD ABOUT YOURSELF - OR THAT YOU ARE A FAILURE OR HAVE LET YOURSELF OR YOUR FAMILY DOWN: 0
SUM OF ALL RESPONSES TO PHQ QUESTIONS 1-9: 0
SUM OF ALL RESPONSES TO PHQ QUESTIONS 1-9: 0
3. TROUBLE FALLING OR STAYING ASLEEP: 0
4. FEELING TIRED OR HAVING LITTLE ENERGY: 0
8. MOVING OR SPEAKING SO SLOWLY THAT OTHER PEOPLE COULD HAVE NOTICED. OR THE OPPOSITE, BEING SO FIGETY OR RESTLESS THAT YOU HAVE BEEN MOVING AROUND A LOT MORE THAN USUAL: 0
SUM OF ALL RESPONSES TO PHQ QUESTIONS 1-9: 0
SUM OF ALL RESPONSES TO PHQ9 QUESTIONS 1 & 2: 0
7. TROUBLE CONCENTRATING ON THINGS, SUCH AS READING THE NEWSPAPER OR WATCHING TELEVISION: 0

## 2022-11-04 ASSESSMENT — SOCIAL DETERMINANTS OF HEALTH (SDOH): HOW HARD IS IT FOR YOU TO PAY FOR THE VERY BASICS LIKE FOOD, HOUSING, MEDICAL CARE, AND HEATING?: NOT HARD AT ALL

## 2022-11-04 NOTE — PROGRESS NOTES
Portions of this chart may have been created with voice recognition software. Occasional wrong-word or \"sound-alike\" substitutions may have occurred due to the inherent limitations of voice recognition software. Read the chart carefully and recognize, using context, where these substitutions have occurred        Chief Complaint     Shortness of Breath               SUBJECTIVE    Junito Jean is a 76 y.o. female here for complaints of shortness of breath. Patient states that she usually feels this way if her hemoglobin is very low. She also was recently hospitalized for colorectal surgery after diagnosis of carcinoma. She also has a follow-up appointment set up with her surgeon next Tuesday. Last hemoglobin was 8.3. Will also recheck her hemoglobin today. No lightheadedness dizziness nausea vomiting abdominal pain any bleeding anywhere. She denies any edema headache sleep disturbances or any other significant symptoms at this time.     REVIEW OF SYSTEMS:  Pertinent positive and negative symptoms noted in HPI        Lab Results   Component Value Date    WBC 4.3 10/26/2022    HGB 8.3 (L) 10/26/2022    HCT 26.7 (L) 10/26/2022    MCV 72.0 (L) 10/26/2022     10/26/2022      Lab Results   Component Value Date    LABA1C 5.5 05/18/2018     Lab Results   Component Value Date    .2 05/18/2018     Lab Results   Component Value Date    TSH 4.13 06/09/2020     Lab Results   Component Value Date    CHOL 188 02/17/2022     Lab Results   Component Value Date    TRIG 74 02/17/2022     Lab Results   Component Value Date    HDL 83 (H) 02/17/2022     Lab Results   Component Value Date    LDLCALC 90 02/17/2022     Lab Results   Component Value Date    LABVLDL 15 02/17/2022     No results found for: [facility]   Lab Results   Component Value Date     10/26/2022    K 3.7 10/26/2022     10/26/2022    CO2 28 10/26/2022    BUN 7 10/26/2022    CREATININE 0.7 10/26/2022    GLUCOSE 92 10/26/2022    CALCIUM 8.7 10/26/2022    PROT 7.1 03/02/2020    LABALBU 3.3 (L) 10/26/2022    BILITOT <0.2 03/02/2020    ALKPHOS 85 03/02/2020    AST 19 03/02/2020    ALT 10 03/02/2020    LABGLOM >60 10/26/2022    GFRAA >60 10/14/2022    AGRATIO 1.7 03/02/2020    GLOB 2.6 03/02/2020           Current Outpatient Medications   Medication Sig Dispense Refill    docusate sodium (COLACE) 100 MG capsule Take 1 capsule by mouth 2 times daily 60 capsule 0    cyanocobalamin 1000 MCG tablet Take 1,000 mcg by mouth daily      furosemide (LASIX) 40 MG tablet TAKE 1 TABLET BY MOUTH DAILY 90 tablet 1    ipratropium (ATROVENT) 0.06 % nasal spray USE 2 SPRAYS IN EACH NOSTRIL IN THE MORNING, 2 SPRAYS AT NOON, 2 SPRAYS IN THE EVENING, AND 2 SPRAYS BEFORE BEDTIME 15 mL 3    midodrine (PROAMATINE) 2.5 MG tablet Take 5 mg by mouth in the morning and at bedtime      digoxin (LANOXIN) 125 MCG tablet Take 125 mcg by mouth daily      escitalopram (LEXAPRO) 10 MG tablet Take 1 tablet by mouth daily 90 tablet 3    fluticasone (FLONASE) 50 MCG/ACT nasal spray 1 spray by Each Nostril route 2 times daily 32 g 3    ondansetron (ZOFRAN ODT) 4 MG disintegrating tablet Place 1 tablet under the tongue every 8 hours as needed for Nausea or Vomiting (Patient not taking: Reported on 11/4/2022) 3 tablet 0    aspirin 81 MG EC tablet Take 81 mg by mouth daily (Patient not taking: No sig reported)       No current facility-administered medications for this visit. Allergies   Allergen Reactions    Seasonal          Past medical, Surgical,Family, Social History Reviewed and Updated in chart today. OBJECTIVE:      Vitals:    11/04/22 1119   BP: 100/66   Pulse: 83   Temp: 97.2 °F (36.2 °C)   SpO2: 97%   Weight: 181 lb 12.8 oz (82.5 kg)   Height: 5' 6\" (1.676 m)         Constitutional: Patient appears well-nourished, not in any distress. HENT:  Head: Normocephalic and atraumatic.    Eyes: Conjunctivae and EOM are normal  Right Ear: External ear normal.  Left Ear: External ear normal.   Nose: Nose normal.  Mouth/Throat: Oropharynx is clear and moist.   Neck: Normal range of motion. Neck supple. Lymphatic: No cervical lymphadenopathy  Cardiovascular: Normal rate, regular rhythm, normal heart sounds and intact distal pulses. Pulmonary/Chest: Effort normal and breath sounds normal, no wheezes or rhonchi. Neurological:Patient is alert, oriented to person, place, and time. No obvious focal neurological deficits  Skin: Skin is warm and moist.  Psychiatric:Patient has a normal mood and affect, behavior is normal. Judgment and thought content normal.    ASSESSMENT AND PLAN    Tash Iniguez was seen today for shortness of breath. Diagnoses and all orders for this visit:    Anemia, unspecified type  -     CBC with Auto Differential; Future    SOB (shortness of breath)  -     CBC with Auto Differential; Future           DISCHARGE MEDICATION LIST        Medication List            Accurate as of November 4, 2022  1:00 PM. If you have any questions, ask your nurse or doctor.                 CONTINUE taking these medications      aspirin 81 MG EC tablet     cyanocobalamin 1000 MCG tablet     digoxin 125 MCG tablet  Commonly known as: LANOXIN     docusate sodium 100 MG capsule  Commonly known as: COLACE  Take 1 capsule by mouth 2 times daily     escitalopram 10 MG tablet  Commonly known as: LEXAPRO  Take 1 tablet by mouth daily     fluticasone 50 MCG/ACT nasal spray  Commonly known as: FLONASE  1 spray by Each Nostril route 2 times daily     furosemide 40 MG tablet  Commonly known as: LASIX  TAKE 1 TABLET BY MOUTH DAILY     ipratropium 0.06 % nasal spray  Commonly known as: ATROVENT  USE 2 SPRAYS IN EACH NOSTRIL IN THE MORNING, 2 SPRAYS AT NOON, 2 SPRAYS IN THE EVENING, AND 2 SPRAYS BEFORE BEDTIME     midodrine 2.5 MG tablet  Commonly known as: PROAMATINE     ondansetron 4 MG disintegrating tablet  Commonly known as: Zofran ODT  Place 1 tablet under the tongue every 8 hours as needed for Nausea or Vomiting               Return if symptoms worsen or fail to improve. Please refer to diagnosis, orders and patient instructions for further recommendations given. All patient's questions and concerns were addressed appropriately. All orders, handouts were reviewed in detail with the patient and patient voiced understanding verbally.

## 2022-11-07 ENCOUNTER — HOSPITAL ENCOUNTER (OUTPATIENT)
Dept: MRI IMAGING | Age: 77
Discharge: HOME | End: 2022-11-07
Payer: MEDICARE

## 2022-11-07 DIAGNOSIS — C18.6 MALIGNANT NEOPLASM OF DESCENDING COLON (HCC): ICD-10-CM

## 2022-11-07 PROCEDURE — 6360000004 HC RX CONTRAST MEDICATION: Performed by: INTERNAL MEDICINE

## 2022-11-07 PROCEDURE — A9581 GADOXETATE DISODIUM INJ: HCPCS | Performed by: INTERNAL MEDICINE

## 2022-11-07 PROCEDURE — 74183 MRI ABD W/O CNTR FLWD CNTR: CPT

## 2022-11-07 RX ADMIN — GADOXETATE DISODIUM 8 ML: 181.43 INJECTION, SOLUTION INTRAVENOUS at 17:34

## 2022-11-09 ENCOUNTER — CARE COORDINATION (OUTPATIENT)
Dept: CASE MANAGEMENT | Age: 77
End: 2022-11-09

## 2022-11-09 NOTE — CARE COORDINATION
[facility] Care Transitions Follow Up Call    Care Transition Nurse contacted the patient by telephone to follow up after admission on 10/26/2022. Verified name and  with patient as identifiers. Patient: Maryse Woo  Patient :    MRN: [id number]   Reason for Admission: S/P Laparoscopic Colectomy  Discharge Date: 10/26/22 RARS: Readmission Risk Score: 14.6      Needs to be reviewed by the provider   Additional needs identified to be addressed with provider: No  none             Method of communication with provider: none. CTN  spoke with patient this afternoon for follow up CTN call. Patient states she is doing well, no reports of any fever, chills, nausea, vomiting, chest pain, SOB or cough. No congestion, pain, difficulty emptying bladder, LE edema, feeling lightheaded, dizziness, and heart palpitations. Addressed changes since last contact:  none  Discussed follow-up appointments. If no appointment was previously scheduled, appointment scheduling offered: Yes. Is follow up appointment scheduled within 7 days of discharge? Yes. Follow Up  Future Appointments   Date Time Provider Amor Freedman   11/15/2022  1:45 PM MD Jina Banks [facility]       Care Transition Nurse reviewed red flags with patient and discussed any barriers to care and/or understanding of plan of care after discharge. Discussed appropriate site of care based on symptoms and resources available to patient including: PCP  Specialist  Urgent care clinics  [facility]   When to call 911. The patient agrees to contact the PCP office for questions related to their healthcare. Advance Care Planning:   not on file.      Patients top risk factors for readmission: medical condition-S/P Laparoscopic Colectomy, medication management, multiple health system providers, and polypharmacy  Interventions to address risk factors: Education of patient/family/caregiver/guardian to support self-management-Patient

## 2022-11-14 RX ORDER — IPRATROPIUM BROMIDE 42 UG/1
SPRAY, METERED NASAL
Qty: 15 ML | Refills: 3 | Status: SHIPPED | OUTPATIENT
Start: 2022-11-14

## 2022-11-15 ENCOUNTER — CARE COORDINATION (OUTPATIENT)
Dept: CASE MANAGEMENT | Age: 77
End: 2022-11-15

## 2022-11-15 ENCOUNTER — OFFICE VISIT (OUTPATIENT)
Dept: SURGERY | Age: 77
End: 2022-11-15

## 2022-11-15 VITALS
TEMPERATURE: 98.2 F | OXYGEN SATURATION: 96 % | SYSTOLIC BLOOD PRESSURE: 113 MMHG | RESPIRATION RATE: 16 BRPM | HEART RATE: 81 BPM | HEIGHT: 66 IN | DIASTOLIC BLOOD PRESSURE: 68 MMHG | WEIGHT: 186 LBS | BODY MASS INDEX: 29.89 KG/M2

## 2022-11-15 DIAGNOSIS — K56.699 COLONIC STRICTURE (HCC): ICD-10-CM

## 2022-11-15 DIAGNOSIS — C18.6 CANCER OF DESCENDING COLON (HCC): Primary | ICD-10-CM

## 2022-11-15 PROCEDURE — 99024 POSTOP FOLLOW-UP VISIT: CPT | Performed by: SURGERY

## 2022-11-15 NOTE — CARE COORDINATION
[facility] Care Transitions Follow Up Call    Care Transition Nurse contacted the patient by telephone to follow up after admission on 10/26/2022. Verified name and  with patient as identifiers. Patient: Paula Shaw  Patient :    MRN: [id number]   Reason for Admission: S/P Partial Laparoscopic Colectomy  Discharge Date: 10/26/22 RARS: Readmission Risk Score: 14.6      Needs to be reviewed by the provider   Additional needs identified to be addressed with provider: No  none             Method of communication with provider: none. CTN spoke with patient this afternoon for follow up CTN call. Patient states she is doing well, no reports of any fever, chills, nausea, vomiting, chest pain, SOB or cough. Patient with no congestion, pain, difficulty emptying bladder, LE edema, feeling lightheaded, dizziness, and heart palpitations. Patient had follow up with surgeon, chemo with port placement in the near future. Addressed changes since last contact:  none  Discussed follow-up appointments. If no appointment was previously scheduled, appointment scheduling offered: Yes. Is follow up appointment scheduled within 7 days of discharge? Yes. Follow Up  No future appointments. Care Transition Nurse reviewed red flags with patient and discussed any barriers to care and/or understanding of plan of care after discharge. Discussed appropriate site of care based on symptoms and resources available to patient including: PCP  Specialist  Urgent care clinics  [facility]   When to call 911. The patient agrees to contact the PCP office for questions related to their healthcare. Advance Care Planning:   not on file.      Patients top risk factors for readmission: medical condition-S/P Partial Laparoscopic Colectomy, medication management, multiple health system providers, and polypharmacy  Interventions to address risk factors: Education of patient/family/caregiver/guardian to support self-management-Patient instructed to continue to monitor for any of the above noted s/s, reporting any that may present to MD immediately. Offered patient enrollment in the Remote Patient Monitoring (RPM) program for in-home monitoring: Patient is not eligible for RPM program.     Care Transitions Subsequent and Final Call    Schedule Follow Up Appointment with PCP: Declined  Subsequent and Final Calls  Do you have any ongoing symptoms?: No  Have your medications changed?: No  Do you have any questions related to your medications?: No  Do you currently have any active services?: No  Do you have any needs or concerns that I can assist you with?: No  Identified Barriers: None  Care Transitions Interventions  No Identified Needs  Other Interventions:             Care Transition Nurse provided contact information for future needs. Plan for follow-up call in 5-7 days based on severity of symptoms and risk factors. Plan for next call: Any issues or concerns that may arise.     Thank Nicole Graves RN  Care Transition Coordinator  Contact [facility]:[phone]

## 2022-11-15 NOTE — PROGRESS NOTES
[facility] COLORECTAL SURGERY  [address]  Dept: [phone]  Dept Fax: [phone]  Loc: [phone]    Visit Date: 11/15/2022    Tesfaye Poole is a 76 y.o. female who presents today for: Post-Op Check (Post-op Robo partial colectomy 10/24/22-patient reports no issues having BM-currently using Colace and Metamucil, states appetite is good, no pain but states she is tired all the time)      Subjective:     Tesfaye Poole is a 76 y.o. female here for postoperative visit after laparoscopic partial colectomy about 3 weeks ago. Overall doing very well. Bowels working regularly. Appetite good. No pain but is having some fatigue. She saw Dr. Asia Chandler of oncology and there is a plan for adjuvant chemotherapy    Patient's problem list, medications, past medical, surgical, family, and social histories were reviewed and updated in the chart as indicated today. Objective:     /68   Pulse 81   Temp 98.2 °F (36.8 °C) (Infrared)   Resp 16   Ht 5' 6\" (1.676 m)   Wt 186 lb (84.4 kg)   SpO2 96%   BMI 30.02 kg/m²     Abdominal/wound: Wounds healing nicely, small area of spit stitch/scab removed    Assessment/Plan:       ASSESSMENT/PLAN:    3-week status post lap partial colectomy. Overall doing great. Final stage was stage III, so she is seeing Dr. Asia Chandler and there is a plan for adjuvant chemotherapy. I have her on for Port-A-Cath placement in the next few weeks. We discussed the risks of the procedure, including but not limited to bleeding, infection, pneumothorax, DVT/PE. DISPOSITION: Port-A-Cath placement in a few weeks. Note completed using dictation software, please excuse any errors. Referring/primary care physician updated through [employer] note if PCP was listed.     Electronically signed by Gaby Garcia MD on 11/15/2022 at 1:43 PM

## 2022-11-22 ENCOUNTER — CARE COORDINATION (OUTPATIENT)
Dept: CASE MANAGEMENT | Age: 77
End: 2022-11-22

## 2022-11-22 ENCOUNTER — TELEPHONE (OUTPATIENT)
Dept: SURGERY | Age: 77
End: 2022-11-22

## 2022-11-22 NOTE — PROGRESS NOTES
Place patient label inside box (if no patient label, complete below)  Name:  :  MR#:   Hilario Arroyo / PROCEDURE  I (we)Junior (Patient Name) authorize Sheron Shaw MD (Provider / Leatha Wagoner) and/or such assistants as may be selected by him/her, to perform the following operation/procedure(s):    INSERT PORT  Note: If unable to obtain consent prior to an emergent procedure, document the emergent reason in the medical record. This procedure has been explained to my (our) satisfaction and included in the explanation was: The intended benefit, nature, and extent of the procedure to be performed; The significant risks involved and the probability of success; Alternative procedures and methods of treatment; The dangers and probable consequences of such alternatives (including no procedure or treatment); The expected consequences of the procedure on my future health; Whether other qualified individuals would be performing important surgical tasks and/or whether  would be present to advise or support the procedure. I (we) understand that there are other risks of infection and other serious complications in the pre-operative/procedural and postoperative/procedural stages of my (our) care. I (we) have asked all of the questions which I (we) thought were important in deciding whether or not to undergo treatment or diagnosis. These questions have been answered to my (our) satisfaction. I (we) understand that no assurance can be given that the procedure will be a success, and no guarantee or warranty of success has been given to me (us). It has been explained to me (us) that during the course of the operation/procedure, unforeseen conditions may be revealed that necessitate extension of the original procedure(s) or different procedure(s) than those set forth in Paragraph 1.  I (we) authorize and request that the above-named physician, his/her assistants or his/her designees, perform procedures as necessary and desirable if deemed to be in my (our) best interest.     Revised 8/2/2021                                                                          Page 1 of 2             I acknowledge that health care personnel may be observing this procedure for the purpose of medical education or other specified purposes as may be necessary as requested and/or approved by my (our) physician. I (we) consent to the disposal by the hospital Pathologist of the removed tissue, parts or organs in accordance with hospital policy. I do ____ do not ____ consent to the use of a local infiltration pain blocking agent that will be used by my provider/surgical provider to help alleviate pain during my procedure. I do ____ do not ____ consent to an emergent blood transfusion in the case of a life-threatening situation that requires blood components to be administered. This consent is valid for 24 hours from the beginning of the procedure. This patient does ____ or does not ____ currently have a DNR status/order. If DNR order is in place, obtain Addendum to the Surgical Consent for ALL Patients with a DNR Order to address kayley-operative status for limited intervention or DNR suspension.      I have read and fully understand the above Consent for Operation/Procedure and that all blanks were completed before I signed the consent.   _____________________________       _____________________      ____/____am/pm  Signature of Patient or legal representative      Printed Name / Relationship            Date / Time   ____________________________       _____________________      ____/____am/pm  Witness to Signature                                    Printed Name                    Date / Time    If patient is unable to sign or is a minor, complete the following)  Patient is a minor, ____ years of age, or unable to sign because: ______________________________________________________________________________________________    If a phone consent is obtained, consent will be documented by using two health care professionals, each affirming that the consenting party has no questions and gives consent for the procedure discussed with the physician/provider.   _____________________          ____________________       _____/_____am/pm   2nd witness to phone consent        Printed name           Date / Time    Informed Consent:  I have provided the explanation described above in section 1 to the patient and/or legal representative.  I have provided the patient and/or legal representative with an opportunity to ask any questions about the proposed operation/procedure.   ___________________________          ____________________         ____/____am/pm  Provider / Proceduralist                            Printed name            Date / Time  Revised 8/2/2021                                                                      Page 2 of 2

## 2022-11-22 NOTE — TELEPHONE ENCOUNTER
I have placed a reminder call to patient for upcoming procedure. Did you speak directly to patient or leave a voicemail? Spoke to patient     Prep? NPO 12AM    Must have a  that is over the age of 25. Must be a friend or family member that can be responsible for signing them out after surgery.     Arrive at the main entrance [facility] at 11:30AM

## 2022-11-22 NOTE — PROGRESS NOTES

## 2022-11-22 NOTE — CARE COORDINATION
[facility] Transitions Initial Follow Up Call    Call within 2 business days of discharge: Yes    Patient:  Liz Fajardo   Patient :  1945  MRN:  [id number]     Reason for Admission: S/P Partial Laparoscopic Colectomy  Discharge Date:  10/26/22    RARS: Readmission Risk              Risk of Unplanned Readmission:  0             Transitions of Care Initial Call    Was this an external facility discharge? Discharge Facility: [address] [place] [address] to be reviewed by the provider   Additional needs identified to be addressed with provider:    no    AMB CC Provider Discharge Needs: none            Non-face-to-face services provided:    1ST CTC attempt to reach Pt regarding recent hospital discharge. CTC left voice recording with call back number requesting a call back. CTN will close out CTN episode at this time, as today was scheduled for final follow up CTN call. Follow up appointments:    No future appointments.   Thank Candelaria Givens RN  Care Transition Coordinator  Contact [facility]:[phone]

## 2022-11-28 ENCOUNTER — ANESTHESIA EVENT (OUTPATIENT)
Dept: OPERATING ROOM | Age: 77
End: 2022-11-28
Payer: MEDICARE

## 2022-11-28 ENCOUNTER — HOSPITAL ENCOUNTER (OUTPATIENT)
Age: 77
Setting detail: OUTPATIENT SURGERY
Discharge: HOME | End: 2022-11-28
Attending: SURGERY | Admitting: SURGERY
Payer: MEDICARE

## 2022-11-28 ENCOUNTER — APPOINTMENT (OUTPATIENT)
Dept: GENERAL RADIOLOGY | Age: 77
End: 2022-11-28
Attending: SURGERY
Payer: MEDICARE

## 2022-11-28 ENCOUNTER — ANESTHESIA (OUTPATIENT)
Dept: OPERATING ROOM | Age: 77
End: 2022-11-28
Payer: MEDICARE

## 2022-11-28 VITALS
SYSTOLIC BLOOD PRESSURE: 118 MMHG | TEMPERATURE: 96 F | HEIGHT: 66 IN | OXYGEN SATURATION: 96 % | BODY MASS INDEX: 28.77 KG/M2 | WEIGHT: 179.04 LBS | HEART RATE: 64 BPM | RESPIRATION RATE: 16 BRPM | DIASTOLIC BLOOD PRESSURE: 73 MMHG

## 2022-11-28 PROBLEM — Z85.038 PERSONAL HISTORY OF COLON CANCER, STAGE III: Status: ACTIVE | Noted: 2022-11-28

## 2022-11-28 PROCEDURE — 7100000010 HC PHASE II RECOVERY - FIRST 15 MIN: Performed by: SURGERY

## 2022-11-28 PROCEDURE — 6360000002 HC RX W HCPCS: Performed by: NURSE ANESTHETIST, CERTIFIED REGISTERED

## 2022-11-28 PROCEDURE — 3700000000 HC ANESTHESIA ATTENDED CARE: Performed by: SURGERY

## 2022-11-28 PROCEDURE — 3700000001 HC ADD 15 MINUTES (ANESTHESIA): Performed by: SURGERY

## 2022-11-28 PROCEDURE — C1788 PORT, INDWELLING, IMP: HCPCS | Performed by: SURGERY

## 2022-11-28 PROCEDURE — 7100000011 HC PHASE II RECOVERY - ADDTL 15 MIN: Performed by: SURGERY

## 2022-11-28 PROCEDURE — 2709999900 HC NON-CHARGEABLE SUPPLY: Performed by: SURGERY

## 2022-11-28 PROCEDURE — 6360000002 HC RX W HCPCS: Performed by: SURGERY

## 2022-11-28 PROCEDURE — 7100000000 HC PACU RECOVERY - FIRST 15 MIN: Performed by: SURGERY

## 2022-11-28 PROCEDURE — 36561 INSERT TUNNELED CV CATH: CPT | Performed by: SURGERY

## 2022-11-28 PROCEDURE — 2580000003 HC RX 258: Performed by: ANESTHESIOLOGY

## 2022-11-28 PROCEDURE — 2500000003 HC RX 250 WO HCPCS: Performed by: SURGERY

## 2022-11-28 PROCEDURE — 3600000002 HC SURGERY LEVEL 2 BASE: Performed by: SURGERY

## 2022-11-28 PROCEDURE — 2580000003 HC RX 258: Performed by: SURGERY

## 2022-11-28 PROCEDURE — 7100000001 HC PACU RECOVERY - ADDTL 15 MIN: Performed by: SURGERY

## 2022-11-28 PROCEDURE — 77001 FLUOROGUIDE FOR VEIN DEVICE: CPT

## 2022-11-28 PROCEDURE — 3600000012 HC SURGERY LEVEL 2 ADDTL 15MIN: Performed by: SURGERY

## 2022-11-28 PROCEDURE — 77001 FLUOROGUIDE FOR VEIN DEVICE: CPT | Performed by: SURGERY

## 2022-11-28 PROCEDURE — 71045 X-RAY EXAM CHEST 1 VIEW: CPT

## 2022-11-28 DEVICE — PORT INFUS SGL LUMN ATTCH POLYUR OPN END CATH 8FR POWERPRT: Type: IMPLANTABLE DEVICE | Status: FUNCTIONAL

## 2022-11-28 RX ORDER — LIDOCAINE HYDROCHLORIDE 20 MG/ML
INJECTION, SOLUTION INTRAVENOUS PRN
Status: DISCONTINUED | OUTPATIENT
Start: 2022-11-28 | End: 2022-11-28 | Stop reason: SDUPTHER

## 2022-11-28 RX ORDER — SODIUM CHLORIDE 0.9 % (FLUSH) 0.9 %
5-40 SYRINGE (ML) INJECTION EVERY 12 HOURS SCHEDULED
Status: DISCONTINUED | OUTPATIENT
Start: 2022-11-28 | End: 2022-11-28 | Stop reason: HOSPADM

## 2022-11-28 RX ORDER — SODIUM CHLORIDE 9 MG/ML
INJECTION, SOLUTION INTRAVENOUS PRN
Status: DISCONTINUED | OUTPATIENT
Start: 2022-11-28 | End: 2022-11-28 | Stop reason: HOSPADM

## 2022-11-28 RX ORDER — SODIUM CHLORIDE 0.9 % (FLUSH) 0.9 %
5-40 SYRINGE (ML) INJECTION PRN
Status: DISCONTINUED | OUTPATIENT
Start: 2022-11-28 | End: 2022-11-28 | Stop reason: HOSPADM

## 2022-11-28 RX ORDER — FERROUS SULFATE 325(65) MG
325 TABLET ORAL
COMMUNITY

## 2022-11-28 RX ORDER — LIDOCAINE HYDROCHLORIDE 10 MG/ML
1 INJECTION, SOLUTION EPIDURAL; INFILTRATION; INTRACAUDAL; PERINEURAL
Status: DISCONTINUED | OUTPATIENT
Start: 2022-11-28 | End: 2022-11-28 | Stop reason: HOSPADM

## 2022-11-28 RX ORDER — BUPIVACAINE HYDROCHLORIDE AND EPINEPHRINE 5; 5 MG/ML; UG/ML
INJECTION, SOLUTION EPIDURAL; INTRACAUDAL; PERINEURAL PRN
Status: DISCONTINUED | OUTPATIENT
Start: 2022-11-28 | End: 2022-11-28 | Stop reason: HOSPADM

## 2022-11-28 RX ORDER — HEPARIN SODIUM (PORCINE) LOCK FLUSH IV SOLN 100 UNIT/ML 100 UNIT/ML
SOLUTION INTRAVENOUS PRN
Status: DISCONTINUED | OUTPATIENT
Start: 2022-11-28 | End: 2022-11-28 | Stop reason: HOSPADM

## 2022-11-28 RX ORDER — SODIUM CHLORIDE, SODIUM LACTATE, POTASSIUM CHLORIDE, CALCIUM CHLORIDE 600; 310; 30; 20 MG/100ML; MG/100ML; MG/100ML; MG/100ML
INJECTION, SOLUTION INTRAVENOUS CONTINUOUS
Status: DISCONTINUED | OUTPATIENT
Start: 2022-11-28 | End: 2022-11-28 | Stop reason: HOSPADM

## 2022-11-28 RX ORDER — PROPOFOL 10 MG/ML
INJECTION, EMULSION INTRAVENOUS CONTINUOUS PRN
Status: DISCONTINUED | OUTPATIENT
Start: 2022-11-28 | End: 2022-11-28 | Stop reason: SDUPTHER

## 2022-11-28 RX ORDER — PROPOFOL 10 MG/ML
INJECTION, EMULSION INTRAVENOUS PRN
Status: DISCONTINUED | OUTPATIENT
Start: 2022-11-28 | End: 2022-11-28 | Stop reason: SDUPTHER

## 2022-11-28 RX ORDER — HYDRALAZINE HYDROCHLORIDE 20 MG/ML
10 INJECTION INTRAMUSCULAR; INTRAVENOUS
Status: DISCONTINUED | OUTPATIENT
Start: 2022-11-28 | End: 2022-11-28 | Stop reason: HOSPADM

## 2022-11-28 RX ORDER — FENTANYL CITRATE 50 UG/ML
25 INJECTION, SOLUTION INTRAMUSCULAR; INTRAVENOUS EVERY 5 MIN PRN
Status: DISCONTINUED | OUTPATIENT
Start: 2022-11-28 | End: 2022-11-28 | Stop reason: HOSPADM

## 2022-11-28 RX ORDER — ONDANSETRON 2 MG/ML
4 INJECTION INTRAMUSCULAR; INTRAVENOUS
Status: DISCONTINUED | OUTPATIENT
Start: 2022-11-28 | End: 2022-11-28 | Stop reason: HOSPADM

## 2022-11-28 RX ORDER — GUAIFENESIN 600 MG/1
1200 TABLET, EXTENDED RELEASE ORAL 2 TIMES DAILY
COMMUNITY

## 2022-11-28 RX ORDER — PROCHLORPERAZINE EDISYLATE 5 MG/ML
5 INJECTION INTRAMUSCULAR; INTRAVENOUS
Status: DISCONTINUED | OUTPATIENT
Start: 2022-11-28 | End: 2022-11-28 | Stop reason: HOSPADM

## 2022-11-28 RX ORDER — LABETALOL HYDROCHLORIDE 5 MG/ML
10 INJECTION, SOLUTION INTRAVENOUS
Status: DISCONTINUED | OUTPATIENT
Start: 2022-11-28 | End: 2022-11-28 | Stop reason: HOSPADM

## 2022-11-28 RX ADMIN — PROPOFOL 125 MCG/KG/MIN: 10 INJECTION, EMULSION INTRAVENOUS at 14:28

## 2022-11-28 RX ADMIN — CEFAZOLIN 2000 MG: 2 INJECTION, POWDER, FOR SOLUTION INTRAMUSCULAR; INTRAVENOUS at 14:30

## 2022-11-28 RX ADMIN — LIDOCAINE HYDROCHLORIDE 60 MG: 20 INJECTION INTRAVENOUS at 14:27

## 2022-11-28 RX ADMIN — SODIUM CHLORIDE, POTASSIUM CHLORIDE, SODIUM LACTATE AND CALCIUM CHLORIDE: 600; 310; 30; 20 INJECTION, SOLUTION INTRAVENOUS at 12:12

## 2022-11-28 RX ADMIN — PROPOFOL 20 MG: 10 INJECTION, EMULSION INTRAVENOUS at 14:38

## 2022-11-28 RX ADMIN — PROPOFOL 40 MG: 10 INJECTION, EMULSION INTRAVENOUS at 14:27

## 2022-11-28 RX ADMIN — PROPOFOL 20 MG: 10 INJECTION, EMULSION INTRAVENOUS at 14:31

## 2022-11-28 ASSESSMENT — PAIN DESCRIPTION - PAIN TYPE: TYPE: SURGICAL PAIN

## 2022-11-28 ASSESSMENT — ENCOUNTER SYMPTOMS
SHORTNESS OF BREATH: 1
STRIDOR: 1

## 2022-11-28 ASSESSMENT — PAIN SCALES - GENERAL
PAINLEVEL_OUTOF10: 0

## 2022-11-28 ASSESSMENT — PAIN - FUNCTIONAL ASSESSMENT: PAIN_FUNCTIONAL_ASSESSMENT: 0-10

## 2022-11-28 NOTE — H&P
Last H & P within the last 30 days. DIAGNOSIS:   Malignant neoplasm of colon, unspecified part of colon ([facility] [address] [C18.9]    Procedure(s):  INSERT PORT     History obtained from: Patient interview and EHR     HISTORY OF PRESENT ILLNESS:   The patient is a 76 y.o. female who has been diagnosed with colon cancer. They present today for port placement in anticipation of planned treatment. Illness Screening: Patient denies fever, chills, worsening cough, or close contact with sick individuals. Past Medical History:        Diagnosis Date    A-fib [facility])     Age-related osteoporosis without current pathological fracture 11/08/2019    Anxiety     Cancer (HCC)     colon stage 3    CHF (congestive heart failure) (HCC)     Colon polyp     Depression     History of blood transfusion     Hypoglycemia     Low blood pressure     Vitamin B12 deficiency     Wears dentures     full set    Wears glasses      Past Surgical History:        Procedure Laterality Date    APPENDECTOMY      CERVICAL FUSION N/A 11/07/2019    C1-2 FUSION POSTERIOR performed by Katheren Cooks.  Sandeep Lopez MD at [address]  01/01/2001    COLECTOMY  10/2022    Dr Silas Melchor  09/01/2012    q 5    COLONOSCOPY N/A 10/11/2022    COLONOSCOPY WITH BIOPSY performed by Nina Mata MD at [facility] N/A 10/11/2022    COLONOSCOPY SUBMUCOSAL/spot  INJECTION performed by Nina Mata MD at [facility] [address]  01/01/2000    HEMICOLECTOMY N/A 10/24/2022    LAPAROSCOPIC LEFT COLECTOMY performed by Jennifer Deal MD at [address] [facility] ([address] [place] [address])  01/01/1978    NYLA-EN-Y GASTRIC BYPASS N/A 10/24/2022    LAPAROSCOPIC REDUCTION OF INTUSSUSCEPTION WITH FIXATION performed by Michael Warren DO at [facility] N/A 10/11/2022    EGD BIOPSY performed by Nina Mata MD at [address] ENDOSCOPY         Medications Prior to Admission: Prior to Admission medications    Medication Sig Start Date End Date Taking? Authorizing Provider   ipratropium (ATROVENT) 0.06 % nasal spray USE 2 SPRAYS IN EACH NOSTRIL EVERY MORNING, AT NOON, EVERY EVENING, AND 2 SPRAYS BEFORE BEDTIME 11/14/22   Saundra Webb MD   cyanocobalamin 1000 MCG tablet Take 1,000 mcg by mouth daily    Historical Provider, MD   ondansetron (ZOFRAN ODT) 4 MG disintegrating tablet Place 1 tablet under the tongue every 8 hours as needed for Nausea or Vomiting  Patient not taking: Reported on 11/15/2022 10/18/22   Nickie Ritchie MD   furosemide (LASIX) 40 MG tablet TAKE 1 TABLET BY MOUTH DAILY 10/7/22 1/5/23  SAURAV Turner CNP   midodrine (PROAMATINE) 2.5 MG tablet Take 5 mg by mouth in the morning and at bedtime    Historical Provider, MD   aspirin 81 MG EC tablet Take 81 mg by mouth daily  Patient not taking: Reported on 11/15/2022    Historical Provider, MD   digoxin (LANOXIN) 125 MCG tablet Take 125 mcg by mouth daily    Historical Provider, MD   escitalopram (LEXAPRO) 10 MG tablet Take 1 tablet by mouth daily 3/28/22   Chace Cronin MD   fluticasone [facility]) 50 MCG/ACT nasal spray 1 spray by Each Nostril route 2 times daily 12/22/21   Saundra Webb MD         Allergies:  Seasonal    PHYSICAL EXAM:      /78   Pulse 80   Temp 97.9 °F (36.6 °C) (Temporal)   Ht 5' 6\" (1.676 m)   Wt 179 lb 0.6 oz (81.2 kg)   SpO2 94%   BMI 28.90 kg/m²      Airway:  Airway patent with no audible stridor    Heart:  Regular rate and rhythm, No murmur noted    Lungs:  No increased work of breathing, good air exchange, clear to auscultation bilaterally, no crackles or wheezing    Abdomen:  Soft, non-distended, non-tender, no rebound tenderness or guarding, and no masses palpated    ASSESSMENT AND PLAN     Patient is a 76 y.o. female with above specified procedure planned.     1.  The patients history and physical was obtained and signed off by the pre-admission testing department. Patient seen and focused exam done today- no new changes since last physical exam on 11/1/22    2. Access to ancillary services are available per request of the provider.     SAURAV Husain - VICENTE     11/28/2022

## 2022-11-28 NOTE — ANESTHESIA PRE PROCEDURE
Department of Anesthesiology  Preprocedure Note       Name:  Paula Shaw   Age:  76 y.o.  :  1945                                          MRN:  [id number]         Date:  2022      Surgeon: Rusty Faust):  Lauren Dyer MD    Procedure: Procedure(s):  INSERT PORT    Medications prior to admission:   Prior to Admission medications    Medication Sig Start Date End Date Taking?  Authorizing Provider   guaiFENesin (MUCINEX) 600 MG extended release tablet Take 1,200 mg by mouth 2 times daily   Yes Historical Provider, MD   ferrous sulfate (IRON 325) 325 (65 Fe) MG tablet Take 325 mg by mouth daily (with breakfast)   Yes Historical Provider, MD   Dextromethorphan-guaiFENesin  MG/15ML LIQD Take by mouth    Historical Provider, MD   ipratropium (ATROVENT) 0.06 % nasal spray USE 2 SPRAYS IN EACH NOSTRIL EVERY MORNING, AT NOON, EVERY EVENING, AND 2 SPRAYS BEFORE BEDTIME 22   Alfredo Cooper MD   cyanocobalamin 1000 MCG tablet Take 1,000 mcg by mouth daily    Historical Provider, MD   ondansetron (ZOFRAN ODT) 4 MG disintegrating tablet Place 1 tablet under the tongue every 8 hours as needed for Nausea or Vomiting  Patient not taking: No sig reported 10/18/22   Lauren Dyer MD   furosemide (LASIX) 40 MG tablet TAKE 1 TABLET BY MOUTH DAILY 10/7/22 1/5/23  SAURAV Schilling - CNP   midodrine (PROAMATINE) 2.5 MG tablet Take 5 mg by mouth in the morning and at bedtime    Historical Provider, MD   aspirin 81 MG EC tablet Take 81 mg by mouth daily  Patient not taking: No sig reported    Historical Provider, MD   digoxin (LANOXIN) 125 MCG tablet Take 125 mcg by mouth daily    Historical Provider, MD   escitalopram (LEXAPRO) 10 MG tablet Take 1 tablet by mouth daily 3/28/22   Vladislav Hernandez MD   fluticasone Christen Sabrannon) 50 MCG/ACT nasal spray 1 spray by Each Nostril route 2 times daily 21   Alfredo Cooper MD       Current medications:    Current Facility-Administered Medications Medication Dose Route Frequency Provider Last Rate Last Admin    ceFAZolin (ANCEF) 2,000 mg in sodium chloride 0.9 % 50 mL IVPB (mini-bag)  2,000 mg IntraVENous Once Geovanni Pena MD        lidocaine PF 1 % injection 1 mL  1 mL IntraDERmal Once PRN Ata Mukherjee MD        lactated ringers infusion   IntraVENous Continuous Ata Mukherjee  mL/hr at 11/28/22 1212 New Bag at 11/28/22 1212    sodium chloride flush 0.9 % injection 5-40 mL  5-40 mL IntraVENous 2 times per day Ata Mukherjee MD        sodium chloride flush 0.9 % injection 5-40 mL  5-40 mL IntraVENous PRN Ata Mukherjee MD        0.9 % sodium chloride infusion   IntraVENous PRN Ata Mukherjee MD           Allergies: Allergies   Allergen Reactions    Seasonal        Problem List:    Patient Active Problem List   Diagnosis Code    Depression F32. A    Anxiety F41.9    S/P gastric bypass Z98.84    Iron deficiency anemia D50.9    GERD (gastroesophageal reflux disease) K21.9    Fatigue R53.83    Lower back pain M54.50    Colon polyp K63.5    Ex-smoker Z87.891    Urinary frequency R35.0    Stenosis of left external auditory canal H61.302    Stress fracture of cervical vertebra with delayed healing M48. 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    Chronic systolic (congestive) heart failure I50.22    Gastrointestinal hemorrhage K92.2    Intussusception of jejunum (HCC) K56.1    Cancer of descending colon (HCC) C18.6       Past Medical History:        Diagnosis Date    A-fib [facility])     Age-related osteoporosis without current pathological fracture 11/08/2019    Anxiety     Cancer (HCC)     colon stage 3    CHF (congestive heart failure) (HCC)     Colon polyp     Depression     History of blood transfusion     Hypoglycemia     Low blood pressure     Vitamin B12 deficiency     Wears dentures     full set    Wears glasses        Past Surgical History:        Procedure Laterality Date    APPENDECTOMY      CERVICAL FUSION N/A 2019    C1-2 FUSION POSTERIOR performed by Vinicius Silva MD at [address]  2001    COLECTOMY  10/2022    Dr Ming Jack  2012    q 5    COLONOSCOPY N/A 10/11/2022    COLONOSCOPY WITH BIOPSY performed by Cristal Moralez MD at [address] N/A 10/11/2022    COLONOSCOPY SUBMUCOSAL/spot  INJECTION performed by Cristal Moralez MD at [address]  2000    HEMICOLECTOMY N/A 10/24/2022    LAPAROSCOPIC LEFT COLECTOMY performed by Silverio Fang MD at [address] [place] [address] ([address] [place] [address])  1978    NYLA-EN-Y GASTRIC BYPASS N/A 10/24/2022    LAPAROSCOPIC REDUCTION OF INTUSSUSCEPTION WITH FIXATION performed by Shady Cortes DO at [address] ENDOSCOPY N/A 10/11/2022    EGD BIOPSY performed by Cristal Moralez MD at [facility] ENDOSCOPY       Social History:    Social History     Tobacco Use    Smoking status: Former     Packs/day: 1.00     Years: 30.00     Pack years: 30.00     Types: Cigarettes     Start date: 10/10/1974     Quit date: 10/10/2004     Years since quittin.1    Smokeless tobacco: Never    Tobacco comments:     15 years ago   Substance Use Topics    Alcohol use: Yes     Comment: occas                                Counseling given: Not Answered  Tobacco comments: 15 years ago      Vital Signs (Current):   Vitals:    22 1118   BP: 114/78   Pulse: 80   Resp: 16   Temp: 97.9 °F (36.6 °C)   TempSrc: Temporal   SpO2: 94%   Weight: 179 lb 0.6 oz (81.2 kg)   Height: 5' 6\" (1.676 m)                                              BP Readings from Last 3 Encounters:   22 114/78   11/15/22 113/68 11/04/22 100/66       NPO Status: Time of last liquid consumption: 2000                        Time of last solid consumption: 1900                        Date of last liquid consumption: 11/27/22                        Date of last solid food consumption: 11/27/22    BMI:   Wt Readings from Last 3 Encounters:   11/28/22 179 lb 0.6 oz (81.2 kg)   11/15/22 186 lb (84.4 kg)   11/04/22 181 lb 12.8 oz (82.5 kg)     Body mass index is 28.9 kg/m². CBC:   Lab Results   Component Value Date/Time    WBC 5.5 11/04/2022 11:40 AM    RBC 4.31 11/04/2022 11:40 AM    HGB 9.8 11/04/2022 11:40 AM    HCT 30.6 11/04/2022 11:40 AM    MCV 71.1 11/04/2022 11:40 AM    RDW 26.8 11/04/2022 11:40 AM     11/04/2022 11:40 AM       CMP:   Lab Results   Component Value Date/Time     10/26/2022 06:56 AM    K 3.7 10/26/2022 06:56 AM    K 4.2 10/24/2022 10:00 AM     10/26/2022 06:56 AM    CO2 28 10/26/2022 06:56 AM    BUN 7 10/26/2022 06:56 AM    CREATININE 0.7 10/26/2022 06:56 AM    GFRAA >60 10/14/2022 06:40 PM    GFRAA >60 08/09/2012 11:15 AM    AGRATIO 1.7 03/02/2020 12:04 PM    LABGLOM >60 10/26/2022 06:56 AM    GLUCOSE 92 10/26/2022 06:56 AM    PROT 7.1 03/02/2020 12:04 PM    PROT 6.5 08/09/2012 11:15 AM    CALCIUM 8.7 10/26/2022 06:56 AM    BILITOT <0.2 03/02/2020 12:04 PM    ALKPHOS 85 03/02/2020 12:04 PM    AST 19 03/02/2020 12:04 PM    ALT 10 03/02/2020 12:04 PM       POC Tests: No results for input(s): POCGLU, POCNA, POCK, POCCL, POCBUN, POCHEMO, POCHCT in the last 72 hours.     Coags:   Lab Results   Component Value Date/Time    PROTIME 11.5 11/07/2019 10:15 AM    INR 1.01 11/07/2019 10:15 AM    APTT 30.9 11/07/2019 10:15 AM       HCG (If Applicable): No results found for: PREGTESTUR, PREGSERUM, HCG, HCGQUANT     ABGs: No results found for: PHART, PO2ART, QPI7XIL, PGD3KCN, BEART, D6IWZFHI     Type & Screen (If Applicable):  No results found for: LABABO, LABRH    Drug/Infectious Status (If Applicable):  No results found for: HIV, HEPCAB    COVID-19 Screening (If Applicable):   Lab Results   Component Value Date/Time    COVID19 Not Detected 12/01/2021 12:48 PM           Anesthesia Evaluation  Patient summary reviewed and Nursing notes reviewed no history of anesthetic complications:   Airway: Mallampati: II  TM distance: >3 FB   Neck ROM: full  Mouth opening: > = 3 FB   Dental:    (+) upper dentures and lower dentures      Pulmonary:normal exam    (+) shortness of breath:  stridor,                             Cardiovascular:    (+) CHF:, CRAVEN:,                   Neuro/Psych:   (+) psychiatric history:            GI/Hepatic/Renal:   (+) GERD:,           Endo/Other: Negative Endo/Other ROS                    Abdominal:             Vascular: negative vascular ROS. Other Findings:           Anesthesia Plan      MAC     ASA 3       Induction: intravenous. MIPS: Prophylactic antiemetics administered. Anesthetic plan and risks discussed with patient. Plan discussed with CRNA.     Attending anesthesiologist reviewed and agrees with Preprocedure content                Fernando Glaser MD   11/28/2022

## 2022-11-28 NOTE — PROGRESS NOTES
Ambulatory Surgery/Procedure Discharge Note    Vitals:    11/28/22 1557   BP: 118/73   Pulse: 64   Resp: 16   Temp: (!) 96 °F (35.6 °C)   SpO2: 96%     Pt meets discharge criteria per Anthony score. In: 900 [I.V.:900]  Out: -     Restroom use offered before discharge. Yes    Pain assessment:  none  Pain Level: 0    Pt and S.O./family states \"ready to go home\". Pt alert and oriented x4. IV removed. Denies N/V or pain. Left chest wall port incision well approximated, c/d/I. Skin glue intact. Discharge instructions given to pt, leonaer and avinash with pt permission. Pt, daughter and avinash verbalized understanding of all instructions. Left with all belongings and discharge instructions. Patient discharged to home/self care.  Patient discharged via wheel chair by transporter to waiting family/S.O.       11/28/2022 4:21 PM

## 2022-11-28 NOTE — PROGRESS NOTES
Pt received from OR to PACU # 18 via stretcher. Post:  L port a cath placement     Report received from ELMO Agrawal and Dr. David Tellez. Ambrose Carney. Per report pt did well. Respirations reg and easy. Pt is alert. Attached to PACU monitoring system. Alarms and parameters set    Pain none and no complaints of nausea.

## 2022-11-28 NOTE — ANESTHESIA POSTPROCEDURE EVALUATION
Department of Anesthesiology  Postprocedure Note    Patient: Colonel Root  MRN: [id number]  YOB: 1945  Date of evaluation: 11/28/2022      Procedure Summary     Date: 11/28/22 Room / Location: [address] [place] [address]    Anesthesia Start: [address] Anesthesia Stop: 1519    Procedure: INSERT PORT (Left: Chest) Diagnosis:       Malignant neoplasm of colon, unspecified part of colon ([address]      (Malignant neoplasm of colon, unspecified part of colon ([address] [C18.9])    Surgeons: Troy Ravi MD Responsible Provider: Keanu Nguyen DO    Anesthesia Type: MAC ASA Status: 3          Anesthesia Type: No value filed.     Anthony Phase I: Nathony Score: 10    Anthony Phase II: Anthony Score: 10      Anesthesia Post Evaluation    Patient location during evaluation: PACU  Patient participation: complete - patient participated  Level of consciousness: awake and alert  Airway patency: patent  Nausea & Vomiting: no nausea and no vomiting  Cardiovascular status: blood pressure returned to baseline  Respiratory status: acceptable  Hydration status: euvolemic

## 2022-11-28 NOTE — DISCHARGE INSTRUCTIONS
Discharge Instructions:     Diet:   May resume regular diet     Wound Care:   Patient may shower. Do not soak or scrub area of incision for 7-10 days. Tissue adhesive will peel off within 10 days. If tissue adhesive does not peel off in 10 days, may use petroleum jelly to remove. [facility] will care for and maintain your dressing; this is to remain in place. Activity:   May resume regular activity     Pain management:   Please take over the counter tylenol or ibuprofen for pain relief as needed. Reasons to Return:   Some soreness and redness is common after surgery, especially for the first 24-48 hours. If, however, you experience for increasing redness, worsening pain, new and/or increasing drainage from wound, fever above 101.5 degrees Farenheit, bleeding that does not stop soon after discovery, or any other concerns about your incision or post op course, please either call the office or call/return to the Emergency Department for further evaluation. Please follow up as needed with Dr. Kezia Forman (surgery). Please call [phone] to make an appointment or if you have any questions/concerns. [address]    There are potential side effects of anesthesia or sedation you may experience for the first 24 hours. These side effects include:    Confusion or Memory loss, Dizziness, or Delayed Reaction Times   [x]A responsible person should be with you for the next 24 hours. Do not operate any vehicles (automobiles, bicycles, motorcycles) or power tools or machinery for 24 hours. Do not sign any legal documents or make any legal decisions for 24 hours. Do not drink alcohol for 24 hours or while taking narcotic pain medication. Nausea    [x]Start with light diet and progress to your normal diet as you feel like eating. However, if you experience nausea or repeated episodes of vomiting which persist beyond 12-24 hours, notify your physician.   Once nausea has passed, remember to keep drinking fluids. Difficulty Passing Urine  [x]Drink extra amounts of fluid today. Notify your physician if you have not urinated within 8 hours after your procedure or you feel uncomfortable. Irritated Throat from a Breathing Tube  [x]Drink extra amounts of fluid today. Lozenges may help. Muscle Aches  [x]You may experience some generalized body aches as your muscles recover from medications used to relax them during surgery. These will gradually subside. MEDICATION INSTRUCTIONS:  []Prescription(S) x     sent with you. Use as directed. When taking pain medications, you may experience the side effect of dizziness or drowsiness. Do not drink alcohol or drive when taking these medications. []Prescription(S) x          Called to Pharmacy Name and location:    [x]Give the list of your medications to your primary care physician on your next visit. Keep your med list updated and carry it with in case of emergencies. [] Narcotic pain medications can cause the side effect of significant constipation. You may want to add a stool softener to your postoperative medication schedule or speak to your surgeon on how best to manage this side effect. NARCOTIC SAFETY:  Your pain medicine is only for you to take. Safely store your medicines. Store pills up high and out of reach of children and pets. Ensure safety caps are snapped tightly  Keep track of how many pills you have left    Unused medication can be disposed of by taking them to a drop-off box or take-back program that is authorized by the [facility]. Access to a site near you can be found on the [facility] [employer] website ([address] [facility].gov). If you have a CPAP machine, it is very important that you use it daily during all periods of sleep and daytime rest during your recovery at home. Surgery and Anesthesia place a significant amount of stress on your body.   Using your CPAP will help keep you safe and lessen the negative effects of that stress. FOLLOW-UP RECOVERY CARE:  [x]Call the office at [phone] for follow-up appointment and problems    Watch for these possible complications, symptoms, or side effects of anesthesia. Call physician if they or any other problems occur:  Signs of INFECTION   > Fever over 101°     > Redness, swelling, hardness or warmth at the operative site   >Foul smelling or cloudy drainage at the operative site   Unrelieved PAIN  Unrelieved NAUSEA  Blood soaked dressing. (Some oozing may be normal)  Inability to urinate      Numb, pale, blue, cold or tingling extremity      Physician:  Dr. Cathryn Sanchez    The above instructions were reviewed with patient/significant other. The following additional patient specific information was reviewed with the patient/significant other:  []Procedure/physician specific instructions  []Medication information sheet(S) including potential side effects  []Shayys egress test  []Pain Ball management  []FAQ Catheter associated blood stream infections  []FAQ Surgical Site Infections  []Other-    I have read and understand the instructions given to me: ____________________________________________   (Patient/S.O. Signature)            Date/time 11/28/2022 3:34 PM         PACU:  [phone]   M-F 700 AM - 7 PM      SAME DAY SERVICES:  [phone] M-F 7AM-6PM        If you smoke STOP. We care about your health!

## 2022-11-28 NOTE — BRIEF OP NOTE
Brief Postoperative Note      Patient: Jamison Red  YOB: 1945  MRN: [id number]    Date of Procedure: 11/28/2022    Pre-Op Diagnosis: Malignant neoplasm of colon, unspecified part of colon ([facility] [address] [C18.9]    Post-Op Diagnosis: Same       Procedure(s):  INSERT PORT    Surgeon(s):  Silverio Fang MD    Assistant:  Resident: Anderson Corrales MD    Anesthesia: Monitor Anesthesia Care    Estimated Blood Loss (mL): Minimal    Complications: None    Specimens:   * No specimens in log *    Implants:  * No implants in log *      Drains: * No LDAs found *    Findings: L subclavian port placement with fluoroscopic guidance    Electronically signed by Anderson Corrales MD on 11/28/2022 at 3:05 PM

## 2022-11-28 NOTE — PROGRESS NOTES
PACU Transfer to [facility]    Vitals:    11/28/22 1545   BP: 107/85   Pulse: 66   Resp: 12   Temp: 97 °F (36.1 °C)   SpO2: 99%         Intake/Output Summary (Last 24 hours) at 11/28/2022 1546  Last data filed at 11/28/2022 1509  Gross per 24 hour   Intake 900 ml   Output --   Net 900 ml       Pain assessment:  none  Pain Level: 0    Patient transferred to care of [facility] RN. Perfect serve message sent and discharge order received from Dr. Aristides Martinez. Theresa Oliver

## 2022-11-28 NOTE — OP NOTE
OPERATIVE NOTE     Patient: Martin Giuseppe  :   MRN: [id number]     PREOPERATIVE DIAGNOSIS: Stage 3 colon cancer     POSTOPERATIVE DIAGNOSIS: Same     PROCEDURE: Left subclavian tunneled single lumen Port-A-Cath insertion with fluoroscopic guidance     SURGEON: Juwan Mishra MD    ASSISTANT: Hari Fleming, PGY 3 resident    ANESTHESIA: MAC + Local     ESTIMATED BLOOD LOSS: Minimal     COMPLICATIONS: None    INDICATIONS: Port-A-Cath placement for chemotherapy as recommended by patient's oncologist.    Risks, benefits, and alternatives discussed with patient and any available family members in the preoperative area. Risks including but not limited to: bleeding, infection, hematoma, major vessel injury, malposition, need for re-operation or removal, and pneumothorax were discussed. All questions answered and patient consented to proceed. PROCEDURE DETAILS:  The patient was brought to the operating theater and placed in the supine position with arms padded and tucked at sides. A towel roll was placed between the scapulae. The patient's bilateral upper chest and neck was then prepped and draped in sterile fashion using chlorhexidine solution. A time-out was performed confirming the patient's identity and operative site. Antibiotics were confirmed to be infusing. SCDs were on and functioning. All safety points were followed per hospital protocol. Sedation was started by anesthesia provider. Patient was placed in Trendelenburg position. The left infraclavicular fossa was anesthetized with local anesthetic. The left subclavian vein was entered on the 1st attempt with return of venous blood. Guidewire was then positioned in the vena cava. This was confirmed using fluoroscopy. 4 cm pocket for the port was planned for the anterior chest wall 2-3 cm below the guidewire insertion site. The skin and tunnel tract were anesthetized with local anesthetic. Incision was then made using a 11-blade scalpel. Electrocautery was then used to deepen the incision through subcutaneous tissue and a pocket was created in the subcutaneous tissue superficial to the fascia. Next, 11-blade scalpel was used to extend the guidewire exit site to a 1 cm incision. The catheter was then tunneled subcutaneously to the exit site of the guidewire. The dilator and sheath were placed over the guidewire in the vena cava under fluoroscopic guidance using Seldinger technique. The dilator and wire were subsequently removed. The catheter was then threaded through the sheath into the vena cava. The tip was positioned close to the cavoatrial junction, as confirmed using fluoroscopy. The catheter was cut to appropriate length; the port/catheter locking mechanism was placed onto the catheter and the catheter was secured to the port. The port was then positioned in the pocket. The port was then accessed. Good return of venous blood was noted and flushed easily. Wounds were inspected. Good hemostasis was noted. Following this, the wounds were then closed using interrupted 3-0 Vicryl and running 4-0 monocril sutures. The wounds were then cleaned, dried and dressed with surgical glue. All counts were correct at the end of the procedure. The patient tolerated the procedure well and was taken to the recovery room in stable condition. Portable chest radiograph ordered for PACU.

## 2022-12-22 ENCOUNTER — HOSPITAL ENCOUNTER (OUTPATIENT)
Dept: GENERAL RADIOLOGY | Age: 77
Discharge: HOME | End: 2022-12-22
Payer: MEDICARE

## 2022-12-22 ENCOUNTER — HOSPITAL ENCOUNTER (OUTPATIENT)
Age: 77
Discharge: HOME | End: 2022-12-22
Payer: MEDICARE

## 2022-12-22 DIAGNOSIS — C18.6 CANCER OF DESCENDING COLON (HCC): ICD-10-CM

## 2022-12-22 PROCEDURE — 71046 X-RAY EXAM CHEST 2 VIEWS: CPT

## 2023-01-04 ENCOUNTER — HOSPITAL ENCOUNTER (OUTPATIENT)
Dept: INTERVENTIONAL RADIOLOGY/VASCULAR | Age: 78
Discharge: HOME | End: 2023-01-04
Payer: MEDICARE

## 2023-01-04 ENCOUNTER — TELEPHONE (OUTPATIENT)
Dept: SURGERY | Age: 78
End: 2023-01-04

## 2023-01-04 DIAGNOSIS — C18.6 CANCER OF DESCENDING COLON (HCC): ICD-10-CM

## 2023-01-04 PROCEDURE — 36598 INJ W/FLUOR EVAL CV DEVICE: CPT

## 2023-01-04 PROCEDURE — 2709999900 IR CONTRAST INJECTION  EXISTING CV ACCESS DEVICE EVALUATION W FLUORO

## 2023-01-04 PROCEDURE — 6360000004 HC RX CONTRAST MEDICATION: Performed by: NURSE PRACTITIONER

## 2023-01-04 RX ADMIN — IOPAMIDOL 10 ML: 612 INJECTION, SOLUTION INTRAVENOUS at 14:52

## 2023-01-04 NOTE — TELEPHONE ENCOUNTER
Patient called stating her port has slipped. She was advised by Dr Loni Sheffield to call to have it fixed.     Please call: [phone]

## 2023-01-04 NOTE — TELEPHONE ENCOUNTER
Spoke with Kenia from [facility], they were able to see her today and flip her port. She states the patient is fine for now. I will call IR and let them know we do not need the time. (They were closed, will call again)  I will call patient and let her know.  (Pt advised)

## 2023-01-06 ENCOUNTER — HOSPITAL ENCOUNTER (OUTPATIENT)
Dept: INTERVENTIONAL RADIOLOGY/VASCULAR | Age: 78
Discharge: HOME | End: 2023-01-06

## 2023-01-09 ENCOUNTER — TELEPHONE (OUTPATIENT)
Dept: SURGERY | Age: 78
End: 2023-01-09

## 2023-01-09 NOTE — TELEPHONE ENCOUNTER
Patient has been scheduled for:    Procedure: Port Revision  Date: 1/12/23  Time: 9:30AM  Arrival: 7:30AM  [facility]  [facility]?: n/a  Prep? npo    Pre-op? Post-op Appt? Patient advised they will need a . Orders routed to surgery scheduling.     Instructions have been mailed/emailed to:    Patient wrote everything down

## 2023-01-09 NOTE — TELEPHONE ENCOUNTER
Patient called stating they are not using her port because they are afraid of it flipping again. [facility] would like for it to be stitched before they use it. They are currently trying to use her hands. They have blown all access in her hands and the chemo has leaked into her hands and arms.     Please call: [phone]

## 2023-01-10 NOTE — PROGRESS NOTES
Left message on patient's answering machine with regard to pre-op instructions. Advised to check with surgeon about holding aspirin.   SC

## 2023-01-10 NOTE — PROGRESS NOTES
Place patient label inside box (if no patient label, complete below)  Name:  :    MR#:   Froylan Blackwood / PROCEDURE  I (we) Taraprince George (Patient Name) authorize Carol Shultz MD (Provider / Ivory Silva) and/or such assistants as may be selected by him/her, to perform the following operation/procedure(s): PORT REVISION       Note: If unable to obtain consent prior to an emergent procedure, document the emergent reason in the medical record. This procedure has been explained to my (our) satisfaction and included in the explanation was: The intended benefit, nature, and extent of the procedure to be performed; The significant risks involved and the probability of success; Alternative procedures and methods of treatment; The dangers and probable consequences of such alternatives (including no procedure or treatment); The expected consequences of the procedure on my future health; Whether other qualified individuals would be performing important surgical tasks and/or whether  would be present to advise or support the procedure. I (we) understand that there are other risks of infection and other serious complications in the pre-operative/procedural and postoperative/procedural stages of my (our) care. I (we) have asked all of the questions which I (we) thought were important in deciding whether or not to undergo treatment or diagnosis. These questions have been answered to my (our) satisfaction. I (we) understand that no assurance can be given that the procedure will be a success, and no guarantee or warranty of success has been given to me (us). It has been explained to me (us) that during the course of the operation/procedure, unforeseen conditions may be revealed that necessitate extension of the original procedure(s) or different procedure(s) than those set forth in Paragraph 1.  I (we) authorize and request that the above-named physician, his/her assistants or his/her designees, perform procedures as necessary and desirable if deemed to be in my (our) best interest.     Revised 8/2/2021                                                                          Page 1 of 2       I acknowledge that health care personnel may be observing this procedure for the purpose of medical education or other specified purposes as may be necessary as requested and/or approved by my (our) physician. I (we) consent to the disposal by the hospital Pathologist of the removed tissue, parts or organs in accordance with hospital policy. I do ____ do not ____ consent to the use of a local infiltration pain blocking agent that will be used by my provider/surgical provider to help alleviate pain during my procedure. I do ____ do not ____ consent to an emergent blood transfusion in the case of a life-threatening situation that requires blood components to be administered. This consent is valid for 24 hours from the beginning of the procedure. This patient does ____ or does not ____ currently have a DNR status/order. If DNR order is in place, obtain Addendum to the Surgical Consent for ALL Patients with a DNR Order to address kayley-operative status for limited intervention or DNR suspension.      I have read and fully understand the above Consent for Operation/Procedure and that all blanks were completed before I signed the consent.   _____________________________       _____________________      ____/____am/pm  Signature of Patient or legal representative      Printed Name / Relationship            Date / Time   ____________________________       _____________________      ____/____am/pm  Witness to Signature                                    Printed Name                    Date / Time    If patient is unable to sign or is a minor, complete the following)  Patient is a minor, ____ years of age, or unable to sign because: ______________________________________________________________________________________________    If a phone consent is obtained, consent will be documented by using two health care professionals, each affirming that the consenting party has no questions and gives consent for the procedure discussed with the physician/provider.   _____________________          ____________________       _____/_____am/pm   2nd witness to phone consent        Printed name           Date / Time    Informed Consent:  I have provided the explanation described above in section 1 to the patient and/or legal representative.  I have provided the patient and/or legal representative with an opportunity to ask any questions about the proposed operation/procedure.   ___________________________          ____________________         ____/____am/pm  Provider / Proceduralist                            Printed name            Date / Time  Revised 8/2/2021                                                                      Page 2 of 2

## 2023-01-10 NOTE — PROGRESS NOTES

## 2023-01-11 ENCOUNTER — TELEPHONE (OUTPATIENT)
Dept: SURGERY | Age: 78
End: 2023-01-11

## 2023-01-11 ENCOUNTER — ANESTHESIA EVENT (OUTPATIENT)
Dept: OPERATING ROOM | Age: 78
End: 2023-01-11
Payer: MEDICARE

## 2023-01-12 ENCOUNTER — APPOINTMENT (OUTPATIENT)
Dept: GENERAL RADIOLOGY | Age: 78
End: 2023-01-12
Attending: SURGERY
Payer: MEDICARE

## 2023-01-12 ENCOUNTER — ANESTHESIA (OUTPATIENT)
Dept: OPERATING ROOM | Age: 78
End: 2023-01-12
Payer: MEDICARE

## 2023-01-12 ENCOUNTER — HOSPITAL ENCOUNTER (OUTPATIENT)
Age: 78
Setting detail: OUTPATIENT SURGERY
Discharge: HOME | End: 2023-01-12
Attending: SURGERY | Admitting: SURGERY
Payer: MEDICARE

## 2023-01-12 VITALS
SYSTOLIC BLOOD PRESSURE: 125 MMHG | HEIGHT: 66 IN | HEART RATE: 56 BPM | TEMPERATURE: 96.9 F | WEIGHT: 175.8 LBS | OXYGEN SATURATION: 95 % | BODY MASS INDEX: 28.25 KG/M2 | DIASTOLIC BLOOD PRESSURE: 68 MMHG | RESPIRATION RATE: 15 BRPM

## 2023-01-12 PROBLEM — Z95.828 PORT-A-CATH IN PLACE: Status: ACTIVE | Noted: 2023-01-12

## 2023-01-12 PROCEDURE — 3700000000 HC ANESTHESIA ATTENDED CARE: Performed by: SURGERY

## 2023-01-12 PROCEDURE — 2500000003 HC RX 250 WO HCPCS: Performed by: SURGERY

## 2023-01-12 PROCEDURE — 36582 REPLACE TUNNELED CV CATH: CPT | Performed by: SURGERY

## 2023-01-12 PROCEDURE — 3600000012 HC SURGERY LEVEL 2 ADDTL 15MIN: Performed by: SURGERY

## 2023-01-12 PROCEDURE — 2709999900 HC NON-CHARGEABLE SUPPLY: Performed by: SURGERY

## 2023-01-12 PROCEDURE — 7100000000 HC PACU RECOVERY - FIRST 15 MIN: Performed by: SURGERY

## 2023-01-12 PROCEDURE — 77001 FLUOROGUIDE FOR VEIN DEVICE: CPT | Performed by: SURGERY

## 2023-01-12 PROCEDURE — 6360000002 HC RX W HCPCS: Performed by: NURSE ANESTHETIST, CERTIFIED REGISTERED

## 2023-01-12 PROCEDURE — 7100000011 HC PHASE II RECOVERY - ADDTL 15 MIN: Performed by: SURGERY

## 2023-01-12 PROCEDURE — 7100000001 HC PACU RECOVERY - ADDTL 15 MIN: Performed by: SURGERY

## 2023-01-12 PROCEDURE — 2580000003 HC RX 258: Performed by: ANESTHESIOLOGY

## 2023-01-12 PROCEDURE — 3600000002 HC SURGERY LEVEL 2 BASE: Performed by: SURGERY

## 2023-01-12 PROCEDURE — 6360000002 HC RX W HCPCS: Performed by: SURGERY

## 2023-01-12 PROCEDURE — 77001 FLUOROGUIDE FOR VEIN DEVICE: CPT

## 2023-01-12 PROCEDURE — 71045 X-RAY EXAM CHEST 1 VIEW: CPT

## 2023-01-12 PROCEDURE — 7100000010 HC PHASE II RECOVERY - FIRST 15 MIN: Performed by: SURGERY

## 2023-01-12 PROCEDURE — 2580000003 HC RX 258: Performed by: NURSE ANESTHETIST, CERTIFIED REGISTERED

## 2023-01-12 PROCEDURE — 3700000001 HC ADD 15 MINUTES (ANESTHESIA): Performed by: SURGERY

## 2023-01-12 RX ORDER — PROPOFOL 10 MG/ML
INJECTION, EMULSION INTRAVENOUS CONTINUOUS PRN
Status: DISCONTINUED | OUTPATIENT
Start: 2023-01-12 | End: 2023-01-12 | Stop reason: SDUPTHER

## 2023-01-12 RX ORDER — FUROSEMIDE 40 MG/1
40 TABLET ORAL DAILY
COMMUNITY

## 2023-01-12 RX ORDER — LABETALOL HYDROCHLORIDE 5 MG/ML
10 INJECTION, SOLUTION INTRAVENOUS
Status: DISCONTINUED | OUTPATIENT
Start: 2023-01-12 | End: 2023-01-12 | Stop reason: HOSPADM

## 2023-01-12 RX ORDER — ONDANSETRON 2 MG/ML
INJECTION INTRAMUSCULAR; INTRAVENOUS PRN
Status: DISCONTINUED | OUTPATIENT
Start: 2023-01-12 | End: 2023-01-12 | Stop reason: SDUPTHER

## 2023-01-12 RX ORDER — SODIUM CHLORIDE, SODIUM LACTATE, POTASSIUM CHLORIDE, CALCIUM CHLORIDE 600; 310; 30; 20 MG/100ML; MG/100ML; MG/100ML; MG/100ML
INJECTION, SOLUTION INTRAVENOUS CONTINUOUS PRN
Status: DISCONTINUED | OUTPATIENT
Start: 2023-01-12 | End: 2023-01-12 | Stop reason: SDUPTHER

## 2023-01-12 RX ORDER — LIDOCAINE HYDROCHLORIDE 10 MG/ML
1 INJECTION, SOLUTION EPIDURAL; INFILTRATION; INTRACAUDAL; PERINEURAL
Status: DISCONTINUED | OUTPATIENT
Start: 2023-01-12 | End: 2023-01-12 | Stop reason: HOSPADM

## 2023-01-12 RX ORDER — FENTANYL CITRATE 50 UG/ML
INJECTION, SOLUTION INTRAMUSCULAR; INTRAVENOUS PRN
Status: DISCONTINUED | OUTPATIENT
Start: 2023-01-12 | End: 2023-01-12 | Stop reason: SDUPTHER

## 2023-01-12 RX ORDER — SODIUM CHLORIDE 9 MG/ML
INJECTION, SOLUTION INTRAVENOUS PRN
Status: DISCONTINUED | OUTPATIENT
Start: 2023-01-12 | End: 2023-01-12 | Stop reason: HOSPADM

## 2023-01-12 RX ORDER — LIDOCAINE HYDROCHLORIDE 20 MG/ML
INJECTION, SOLUTION INTRAVENOUS PRN
Status: DISCONTINUED | OUTPATIENT
Start: 2023-01-12 | End: 2023-01-12 | Stop reason: SDUPTHER

## 2023-01-12 RX ORDER — SODIUM CHLORIDE 0.9 % (FLUSH) 0.9 %
5-40 SYRINGE (ML) INJECTION PRN
Status: DISCONTINUED | OUTPATIENT
Start: 2023-01-12 | End: 2023-01-12 | Stop reason: HOSPADM

## 2023-01-12 RX ORDER — ONDANSETRON 2 MG/ML
4 INJECTION INTRAMUSCULAR; INTRAVENOUS
Status: DISCONTINUED | OUTPATIENT
Start: 2023-01-12 | End: 2023-01-12 | Stop reason: HOSPADM

## 2023-01-12 RX ORDER — PROPOFOL 10 MG/ML
INJECTION, EMULSION INTRAVENOUS PRN
Status: DISCONTINUED | OUTPATIENT
Start: 2023-01-12 | End: 2023-01-12 | Stop reason: SDUPTHER

## 2023-01-12 RX ORDER — PROCHLORPERAZINE EDISYLATE 5 MG/ML
5 INJECTION INTRAMUSCULAR; INTRAVENOUS
Status: DISCONTINUED | OUTPATIENT
Start: 2023-01-12 | End: 2023-01-12 | Stop reason: HOSPADM

## 2023-01-12 RX ORDER — HEPARIN SODIUM (PORCINE) LOCK FLUSH IV SOLN 100 UNIT/ML 100 UNIT/ML
SOLUTION INTRAVENOUS PRN
Status: DISCONTINUED | OUTPATIENT
Start: 2023-01-12 | End: 2023-01-12 | Stop reason: ALTCHOICE

## 2023-01-12 RX ORDER — ACETAMINOPHEN 325 MG/1
650 TABLET ORAL
Status: DISCONTINUED | OUTPATIENT
Start: 2023-01-12 | End: 2023-01-12 | Stop reason: HOSPADM

## 2023-01-12 RX ORDER — FENTANYL CITRATE 50 UG/ML
25 INJECTION, SOLUTION INTRAMUSCULAR; INTRAVENOUS EVERY 5 MIN PRN
Status: DISCONTINUED | OUTPATIENT
Start: 2023-01-12 | End: 2023-01-12 | Stop reason: HOSPADM

## 2023-01-12 RX ORDER — SODIUM CHLORIDE 0.9 % (FLUSH) 0.9 %
5-40 SYRINGE (ML) INJECTION EVERY 12 HOURS SCHEDULED
Status: DISCONTINUED | OUTPATIENT
Start: 2023-01-12 | End: 2023-01-12 | Stop reason: HOSPADM

## 2023-01-12 RX ORDER — IPRATROPIUM BROMIDE AND ALBUTEROL SULFATE 2.5; .5 MG/3ML; MG/3ML
1 SOLUTION RESPIRATORY (INHALATION)
Status: DISCONTINUED | OUTPATIENT
Start: 2023-01-12 | End: 2023-01-12 | Stop reason: HOSPADM

## 2023-01-12 RX ORDER — CEPHALEXIN 500 MG/1
1 CAPSULE ORAL 2 TIMES DAILY
COMMUNITY
Start: 2023-01-10

## 2023-01-12 RX ORDER — SODIUM CHLORIDE, SODIUM LACTATE, POTASSIUM CHLORIDE, CALCIUM CHLORIDE 600; 310; 30; 20 MG/100ML; MG/100ML; MG/100ML; MG/100ML
INJECTION, SOLUTION INTRAVENOUS CONTINUOUS
Status: DISCONTINUED | OUTPATIENT
Start: 2023-01-12 | End: 2023-01-12 | Stop reason: HOSPADM

## 2023-01-12 RX ORDER — BUPIVACAINE HYDROCHLORIDE 5 MG/ML
INJECTION, SOLUTION EPIDURAL; INTRACAUDAL PRN
Status: DISCONTINUED | OUTPATIENT
Start: 2023-01-12 | End: 2023-01-12 | Stop reason: ALTCHOICE

## 2023-01-12 RX ADMIN — LIDOCAINE HYDROCHLORIDE 60 MG: 20 INJECTION, SOLUTION INTRAVENOUS at 08:15

## 2023-01-12 RX ADMIN — PHENYLEPHRINE HYDROCHLORIDE 100 MCG: 10 INJECTION, SOLUTION INTRAMUSCULAR; INTRAVENOUS; SUBCUTANEOUS at 08:35

## 2023-01-12 RX ADMIN — LIDOCAINE HYDROCHLORIDE 40 MG: 20 INJECTION, SOLUTION INTRAVENOUS at 08:22

## 2023-01-12 RX ADMIN — SODIUM CHLORIDE, SODIUM LACTATE, POTASSIUM CHLORIDE, AND CALCIUM CHLORIDE: .6; .31; .03; .02 INJECTION, SOLUTION INTRAVENOUS at 08:11

## 2023-01-12 RX ADMIN — PHENYLEPHRINE HYDROCHLORIDE 50 MCG: 10 INJECTION, SOLUTION INTRAMUSCULAR; INTRAVENOUS; SUBCUTANEOUS at 08:51

## 2023-01-12 RX ADMIN — PROPOFOL 20 MG: 10 INJECTION, EMULSION INTRAVENOUS at 08:15

## 2023-01-12 RX ADMIN — PHENYLEPHRINE HYDROCHLORIDE 100 MCG: 10 INJECTION, SOLUTION INTRAMUSCULAR; INTRAVENOUS; SUBCUTANEOUS at 08:26

## 2023-01-12 RX ADMIN — SODIUM CHLORIDE, POTASSIUM CHLORIDE, SODIUM LACTATE AND CALCIUM CHLORIDE: 600; 310; 30; 20 INJECTION, SOLUTION INTRAVENOUS at 07:53

## 2023-01-12 RX ADMIN — FENTANYL CITRATE 25 MCG: 50 INJECTION, SOLUTION INTRAMUSCULAR; INTRAVENOUS at 08:23

## 2023-01-12 RX ADMIN — PHENYLEPHRINE HYDROCHLORIDE 100 MCG: 10 INJECTION, SOLUTION INTRAMUSCULAR; INTRAVENOUS; SUBCUTANEOUS at 08:40

## 2023-01-12 RX ADMIN — ONDANSETRON 4 MG: 2 INJECTION INTRAMUSCULAR; INTRAVENOUS at 08:36

## 2023-01-12 RX ADMIN — PROPOFOL 125 MCG/KG/MIN: 10 INJECTION, EMULSION INTRAVENOUS at 08:15

## 2023-01-12 ASSESSMENT — PAIN SCALES - GENERAL: PAINLEVEL_OUTOF10: 0

## 2023-01-12 ASSESSMENT — PAIN - FUNCTIONAL ASSESSMENT: PAIN_FUNCTIONAL_ASSESSMENT: 0-10

## 2023-01-12 ASSESSMENT — ENCOUNTER SYMPTOMS: SHORTNESS OF BREATH: 1

## 2023-01-12 NOTE — OP NOTE
Operative Note      Patient: Sumeet Sterling  YOB: 1945  MRN: [id number]    Date of Procedure: 1/12/2023    Pre-Op Diagnosis: Portacath malfunction    Post-Op Diagnosis: Same       Procedure(s):  PORT REVISION with fluoroscopic guidance    Surgeon(s):  Tamar Sheffield MD    Assistant:   Resident: Ag Dueñas DO    Anesthesia: Monitor Anesthesia Care    Estimated Blood Loss (mL): Minimal    Complications: None    Findings: Port-A-Cath had flipped in the subcutaneous tissue    Indications: Patient well-known to me, currently undergoing adjuvant chemotherapy. She had malfunction of her Port-A-Cath, in which it had flipped in the subcutaneous tissue. Interventional radiology had attempted to revise it. She then presented back with continued malfunction. I consented her to proceed with Port-A-Cath revision versus replacement. Detailed Description of Procedure:   Patient was brought to the operating theater and placed supine on the operating table. Sedation was started by anesthesia. Her left upper chest was prepped and draped in the usual sterile fashion using chlorhexidine preparation. Timeout was performed confirming patient identity as well as the operative site. Antibiotics were confirmed to be perfusing. All safety points were followed. I began by injecting about 60 cc of 1% local anesthetic into the old scar. 15 blade scalpel was used to incise through the skin and subcutaneous tissue down to the Port-A-Cath pocket. Port-A-Cath was noted to be completely flipped 180 degrees. I righted it, and then tacked it to the port pocket using 2-0 Prolene suture x2. It was now noted to be in proper positioning. Heparin flush was used to aspirate with good return and then flushed with heparinized saline. X-ray was used to obtain final positioning confirming good location and good function. The wound was then closed using 3-0 Vicryl, 4-0 Monocryl and skin glue.   Patient tolerated the procedure well, was brought to PACU in stable condition. All counts were correct x2. No complications. Chest x-ray ordered for PACU. Patient's family updated operative findings.     Electronically signed by Gale Ramos MD on 1/12/2023 at 9:22 AM

## 2023-01-12 NOTE — PROGRESS NOTES
PORT REVISION   Dr Joe Hope    Current Allergies: Seasonal    No results for input(s): POCGLU in the last 72 hours. Admitted to PACU bed 13 from OR. Arrived on a stretcher . Attached to PACU monitoring system. Alarms and parameters set. Report received from anesthesia personnel. [facility] [place] staff did not report skin issues that were observed while in OR  No problems reported intraoperatively. Pt arrived with oxygen per High flow nasal cannula with oxygen at 4 liters. Athrombic wraps in place. Removed for discharge    Surgical site with surgical glue   Needs chest X-ray before being discharged    Doctors aware of all labs before coming to recovery.

## 2023-01-12 NOTE — ANESTHESIA POSTPROCEDURE EVALUATION
Department of Anesthesiology  Postprocedure Note    Patient: Tawanda Smith  MRN: [id number]  YOB: 1945  Date of evaluation: 1/12/2023      Procedure Summary     Date: 01/12/23 Room / Location: [address]    Anesthesia Start: 1218 Anesthesia Stop: 0843    Procedure: PORT REVISION (Left) Diagnosis:       Malignant neoplasm of descending colon ([address]      (Malignant neoplasm of descending colon ([address] [C18.6])    Surgeons: Carolina Vanessa MD Responsible Provider: Ethan Herrera MD    Anesthesia Type: TIVA ASA Status: 3          Anesthesia Type: No value filed.     Anthony Phase I: Anthony Score: 8    Anthony Phase II:        Anesthesia Post Evaluation    Patient location during evaluation: PACU  Patient participation: complete - patient participated  Level of consciousness: awake  Pain score: 2  Nausea & Vomiting: no nausea and no vomiting  Complications: no  Cardiovascular status: blood pressure returned to baseline  Respiratory status: acceptable  Hydration status: euvolemic

## 2023-01-12 NOTE — BRIEF OP NOTE
Brief Postoperative Note      Patient: Kaitlin Emanuel  YOB: 1945  MRN: [id number]    Date of Procedure: 1/12/2023    Pre-Op Diagnosis: Malignant neoplasm of descending colon ([address] [C18.6]    Post-Op Diagnosis: Same       Procedure(s):  PORT REVISION    Surgeon(s):  Ciro Gomez MD    Assistant:  Resident: Bernardino Almaguer DO    Anesthesia: Monitor Anesthesia Care    Estimated Blood Loss (mL): Minimal    Complications: None    Specimens:   * No specimens in log *    Implants:  * No implants in log *      Drains: * No LDAs found *    Findings: Port a cath not accessible 2/2 flipped over. 2x Sutures placed in port to prevent problem. Accessed intra operatively, patent. Location confirmed on fluoroscopy.      Electronically signed by Bernardino Almaguer DO on 1/12/2023 at 9:05 AM

## 2023-01-12 NOTE — PROGRESS NOTES
PACU Transfer to [facility]  #5    Procedure(s):  PORT REVISION    Dr Shena Freed    Pt's Current Allergies: Seasonal    Pt meets criteria to transfer to next phase of care per Aaron Sterling and ALISON standards    No results for input(s): POCGLU in the last 72 hours. Vitals:    01/12/23 1000   BP: 132/66   Pulse: 55   Resp: 13   Temp: 97.5 °F (36.4 °C)   SpO2: 95%         Intake/Output Summary (Last 24 hours) at 1/12/2023 1013  Last data filed at 1/12/2023 0901  Gross per 24 hour   Intake 500 ml   Output 2 ml   Net 498 ml     Decided PO wanting to leave    Pain assessment:  none  Pain Level: 0    Patient was assessed for unknown alterations to skin integrity. There were not unknown alterations observed. Surgical glue to operative site  X-ray read     Patient transferred to care of SDS RN.  Via handoff sheet  Family updated and directed to [facility]  via waiting room staff    1/12/2023 10:13 AM

## 2023-01-12 NOTE — H&P
PRE-OP/PRE-PROCEDURE H&P    Visit Date: 1/12/2023    History:     Subhash Becerra is a 76 y.o. female who presents today for procedure. See my/PCP/oncologist office notes for indications and details. Patient Active Problem List:     Depression     Anxiety     S/P gastric bypass     Iron deficiency anemia     GERD (gastroesophageal reflux disease)     Fatigue     Lower back pain     Colon polyp     Ex-smoker     Urinary frequency     Stenosis of left external auditory canal     Stress fracture of cervical vertebra with delayed healing     Odontoid fracture, closed, initial encounter ([facility]     Age-related osteoporosis without current pathological fracture     Vision loss, left eye     CRAVEN (dyspnea on exertion)     Atheroembolism of other site (HCC)     Chronic systolic (congestive) heart failure     Gastrointestinal hemorrhage     Intussusception of jejunum (HCC)     Cancer of descending colon ([facility]     Personal history of colon cancer, stage III         Current Facility-Administered Medications:     ceFAZolin (ANCEF) 2,000 mg in sodium chloride 0.9 % 50 mL IVPB (mini-bag), 2,000 mg, IntraVENous, On Call to OR, Susan Russ MD    lidocaine PF 1 % injection 1 mL, 1 mL, IntraDERmal, Once PRN, Christian Radha, DO    lactated ringers infusion, , IntraVENous, Continuous, Christian Radha, DO  Prior to Admission medications    Medication Sig Start Date End Date Taking?  Authorizing Provider   furosemide (LASIX) 40 MG tablet Take 40 mg by mouth daily   Yes Historical Provider, MD   cephALEXin (KEFLEX) 500 MG capsule Take 1 capsule by mouth in the morning and at bedtime 1/10/23   Historical Provider, MD   Dextromethorphan-guaiFENesin  MG/15ML LIQD Take by mouth    Historical Provider, MD   guaiFENesin (MUCINEX) 600 MG extended release tablet Take 1,200 mg by mouth 2 times daily    Historical Provider, MD   ferrous sulfate (IRON 325) 325 (65 Fe) MG tablet Take 325 mg by mouth daily (with breakfast)    Historical Provider, MD   cyanocobalamin 1000 MCG tablet Take 1,000 mcg by mouth daily    Historical Provider, MD   ondansetron (ZOFRAN ODT) 4 MG disintegrating tablet Place 1 tablet under the tongue every 8 hours as needed for Nausea or Vomiting  Patient not taking: No sig reported 10/18/22   Ankita Chicas MD   midodrine (PROAMATINE) 2.5 MG tablet Take 5 mg by mouth in the morning and at bedtime    Historical Provider, MD   digoxin (LANOXIN) 125 MCG tablet Take 125 mcg by mouth daily    Historical Provider, MD   escitalopram (LEXAPRO) 10 MG tablet Take 1 tablet by mouth daily 3/28/22   Merline Justice, MD   fluticasone [facility]) 50 MCG/ACT nasal spray 1 spray by Each Nostril route 2 times daily 12/22/21   Joshua Carter MD     Allergies   Allergen Reactions    Seasonal      Past Medical History:   Diagnosis Date    A-fib [facility])     Age-related osteoporosis without current pathological fracture 11/08/2019    Anxiety     Cancer ([address]     colon stage 3    CHF (congestive heart failure) (HCC)     Colon polyp     Depression     History of blood transfusion     Hypoglycemia     Low blood pressure     Vitamin B12 deficiency     Wears dentures     full set    Wears glasses      Past Surgical History:   Procedure Laterality Date    APPENDECTOMY      CERVICAL FUSION N/A 11/07/2019    C1-2 FUSION POSTERIOR performed by Foster Dean.  Mani Mazariegos MD at [address] [place] [address]  01/01/2001    COLECTOMY  10/2022    Dr Miri Marie    COLONOSCOPY  09/01/2012    q 5    COLONOSCOPY N/A 10/11/2022    COLONOSCOPY WITH BIOPSY performed by Monet Palomino MD at [facility] N/A 10/11/2022    COLONOSCOPY SUBMUCOSAL/spot  INJECTION performed by Monet Palomino MD at [facility]  01/01/2000    HEMICOLECTOMY N/A 10/24/2022    LAPAROSCOPIC LEFT COLECTOMY performed by Ankita Chicas MD at [address] [place] [address] ([address] [place] [address])  01/01/1978    PORT SURGERY Left 11/28/2022    INSERT PORT performed by Tamar Sheffield MD at [address] [facility] 10/24/2022    LAPAROSCOPIC REDUCTION OF INTUSSUSCEPTION WITH FIXATION performed by Triston Salmon DO at [address] N/A 10/11/2022    EGD BIOPSY performed by Camacho Michel MD at [facility] ENDOSCOPY         Physical Exam:     /77   Pulse 72   Temp 97.1 °F (36.2 °C) (Temporal)   Resp 16   Ht 5' 6\" (1.676 m)   Wt 175 lb 12.8 oz (79.7 kg)   SpO2 95%   BMI 28.37 kg/m²  Body mass index is 28.37 kg/m². Constitutional: Appears well-developed and well-nourished. Head: Normocephalic, atraumatic. Eyes: No scleral icterus. Vision intact grossly. ENT: Hearing grossly intact. No facial deformity. Neck: Normal range of motion. No tracheal deviation. Cardiovascular: Normal rate. No peripheral edema. Pulmonary/Chest: Effort normal. No respiratory distress. No wheezes. No use of accessory muscles. Musculoskeletal: No gross deformity. Neurological: Alert and oriented to person, place, and time. No gross deficits. Skin: Skin is dry. No rash noted. No pallor. Psychiatric: Normal mood and affect. Behavior normal. Oriented to person, place, and time. Abdomen: soft, NTTP, non distended    Recent labs and imaging reviewed as necessary. Assessment/Plan:       Proceed as planned for port adjustment vs replacement    Risks/benefits/alternatives of procedure/surgery discussed with patient and any present family members (or appropriate guardian) and understanding verbalized. All questions answered. Patient wishes to proceed.     Electronically signed by Tamar Sheffield MD on 1/12/2023 at 7:41 AM

## 2023-01-12 NOTE — PROGRESS NOTES
Ambulatory Surgery/Procedure Discharge Note    Vitals:    01/12/23 1015   BP: 125/68   Pulse: 56   Resp: 15   Temp: 96.9 °F (36.1 °C)   SpO2: 95%       In: 620 [P.O.:120; I.V.:500]  Out: 2     Restroom use offered before discharge. Yes    Pain assessment:  none  Pain Level: 0    Patient denies pain. Incision is closed with surgical glue. Incision is clean, dry, and intact. Patient tolerating all PO intake. Patient offered restroom before discharge. Discharge instructions given to patient and patient's daughter. Patient is ready for discharge. Patient discharged to home/self care.  Patient discharged via wheel chair by transporter to waiting family/S.O.       1/12/2023 10:42 AM

## 2023-01-12 NOTE — ANESTHESIA PRE PROCEDURE
Department of Anesthesiology  Preprocedure Note       Name:  Hellen Cage   Age:  76 y.o.  :  1945                                          MRN:  [id number]         Date:  2023      Surgeon: Domingo Wong):  Kaitlyn Bass MD    Procedure: Procedure(s):  PORT REVISION    Medications prior to admission:   Prior to Admission medications    Medication Sig Start Date End Date Taking?  Authorizing Provider   furosemide (LASIX) 40 MG tablet Take 40 mg by mouth daily   Yes Historical Provider, MD   cephALEXin (KEFLEX) 500 MG capsule Take 1 capsule by mouth in the morning and at bedtime 1/10/23   Historical Provider, MD   Dextromethorphan-guaiFENesin  MG/15ML LIQD Take by mouth    Historical Provider, MD   guaiFENesin (MUCINEX) 600 MG extended release tablet Take 1,200 mg by mouth 2 times daily    Historical Provider, MD   ferrous sulfate (IRON 325) 325 (65 Fe) MG tablet Take 325 mg by mouth daily (with breakfast)    Historical Provider, MD   cyanocobalamin 1000 MCG tablet Take 1,000 mcg by mouth daily    Historical Provider, MD   ondansetron (ZOFRAN ODT) 4 MG disintegrating tablet Place 1 tablet under the tongue every 8 hours as needed for Nausea or Vomiting  Patient not taking: No sig reported 10/18/22   Kaitlyn Bass MD   midodrine (PROAMATINE) 2.5 MG tablet Take 5 mg by mouth in the morning and at bedtime    Historical Provider, MD   digoxin (LANOXIN) 125 MCG tablet Take 125 mcg by mouth daily    Historical Provider, MD   escitalopram (LEXAPRO) 10 MG tablet Take 1 tablet by mouth daily 3/28/22   Annita Peralta MD   fluticasone Zuleyma Babar) 50 MCG/ACT nasal spray 1 spray by Each Nostril route 2 times daily 21   Mayra Martínez MD       Current medications:    Current Facility-Administered Medications   Medication Dose Route Frequency Provider Last Rate Last Admin    ceFAZolin (ANCEF) 2,000 mg in sodium chloride 0.9 % 50 mL IVPB (mini-bag)  2,000 mg IntraVENous On Call to [address], MD        lidocaine PF 1 % injection 1 mL  1 mL IntraDERmal Once PRN Shaylee Shad, DO        lactated ringers infusion   IntraVENous Continuous Shaylee Martind, DO 50 mL/hr at 01/12/23 0753 New Bag at 01/12/23 0753       Allergies: Allergies   Allergen Reactions    Seasonal        Problem List:    Patient Active Problem List   Diagnosis Code    Depression F32. A    Anxiety F41.9    S/P gastric bypass Z98.84    Iron deficiency anemia D50.9    GERD (gastroesophageal reflux disease) K21.9    Fatigue R53.83    Lower back pain M54.50    Colon polyp K63.5    Ex-smoker Z87.891    Urinary frequency R35.0    Stenosis of left external auditory canal H61.302    Stress fracture of cervical vertebra with delayed healing M48. 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    Chronic systolic (congestive) heart failure I50.22    Gastrointestinal hemorrhage K92.2    Intussusception of jejunum (HCC) K56.1    Cancer of descending colon (HCC) C18.6    Personal history of colon cancer, stage III Z85.038       Past Medical History:        Diagnosis Date    [facility]fib ([facility]     Age-related osteoporosis without current pathological fracture 11/08/2019    Anxiety     Cancer (HCC)     colon stage 3    CHF (congestive heart failure) (HCC)     Colon polyp     Depression     History of blood transfusion     Hypoglycemia     Low blood pressure     Vitamin B12 deficiency     Wears dentures     full set    Wears glasses        Past Surgical History:        Procedure Laterality Date    APPENDECTOMY      CERVICAL FUSION N/A 11/07/2019    C1-2 FUSION POSTERIOR performed by Darryn Ansari MD at [facility]  01/01/2001    COLECTOMY  10/2022    Dr Ana Kaufman  09/01/2012    q 5    COLONOSCOPY N/A 10/11/2022    COLONOSCOPY WITH BIOPSY performed by Dick Vaughn MD at [address] N/A 10/11/2022    COLONOSCOPY SUBMUCOSAL/spot  INJECTION [facility]    HEMICOLECTOMY N/A 10/24/2022    LAPAROSCOPIC LEFT COLECTOMY performed by Kaitlyn Bass MD at /[facility] ([facility])  1978    PORT SURGERY Left 2022    INSERT PORT performed by Kaitlyn Bass MD at [facility] N/A 10/24/2022    LAPAROSCOPIC REDUCTION OF INTUSSUSCEPTION WITH FIXATION performed by Cherylene Glee, DO at [address] [facility] ENDOSCOPY N/A 10/11/2022    EGD BIOPSY performed by Dick Vuaghn MD at [facility] ENDOSCOPY       Social History:    Social History     Tobacco Use    Smoking status: Former     Packs/day: 1.00     Years: 30.00     Pack years: 30.00     Types: Cigarettes     Start date: 10/10/1974     Quit date: 10/10/2004     Years since quittin.2    Smokeless tobacco: Never    Tobacco comments:     15 years ago   Substance Use Topics    Alcohol use: Yes     Comment: occas                                Counseling given: Not Answered  Tobacco comments: 15 years ago      Vital Signs (Current):   Vitals:    01/10/23 0818 23 0734   BP:  118/77   Pulse:  72   Resp:  16   Temp:  97.1 °F (36.2 °C)   TempSrc:  Temporal   SpO2:  95%   Weight: 179 lb (81.2 kg) 175 lb 12.8 oz (79.7 kg)   Height: 5' 6\" (1.676 m) 5' 6\" (1.676 m)                                              BP Readings from Last 3 Encounters:   23 118/77   22 118/73   11/15/22 113/68       NPO Status: Time of last liquid consumption: 1900                        Time of last solid consumption: 1700                        Date of last liquid consumption: 23                        Date of last solid food consumption: 23    BMI:   Wt Readings from Last 3 Encounters:   01/12/23 175 lb 12.8 oz (79.7 kg)   11/28/22 179 lb 0.6 oz (81.2 kg)   11/15/22 186 lb (84.4 kg)     Body mass index is 28.37 kg/m². CBC:   Lab Results   Component Value Date/Time    WBC 5.5 11/04/2022 11:40 AM    RBC 4.31 11/04/2022 11:40 AM    HGB 9.8 11/04/2022 11:40 AM    HCT 30.6 11/04/2022 11:40 AM    MCV 71.1 11/04/2022 11:40 AM    RDW 26.8 11/04/2022 11:40 AM     11/04/2022 11:40 AM       CMP:   Lab Results   Component Value Date/Time     10/26/2022 06:56 AM    K 3.7 10/26/2022 06:56 AM    K 4.2 10/24/2022 10:00 AM     10/26/2022 06:56 AM    CO2 28 10/26/2022 06:56 AM    BUN 7 10/26/2022 06:56 AM    CREATININE 0.7 10/26/2022 06:56 AM    GFRAA >60 10/14/2022 06:40 PM    GFRAA >60 08/09/2012 11:15 AM    AGRATIO 1.7 03/02/2020 12:04 PM    LABGLOM >60 10/26/2022 06:56 AM    GLUCOSE 92 10/26/2022 06:56 AM    PROT 7.1 03/02/2020 12:04 PM    PROT 6.5 08/09/2012 11:15 AM    CALCIUM 8.7 10/26/2022 06:56 AM    BILITOT <0.2 03/02/2020 12:04 PM    ALKPHOS 85 03/02/2020 12:04 PM    AST 19 03/02/2020 12:04 PM    ALT 10 03/02/2020 12:04 PM       POC Tests: No results for input(s): POCGLU, POCNA, POCK, POCCL, POCBUN, POCHEMO, POCHCT in the last 72 hours.     Coags:   Lab Results   Component Value Date/Time    PROTIME 11.5 11/07/2019 10:15 AM    INR 1.01 11/07/2019 10:15 AM    APTT 30.9 11/07/2019 10:15 AM       HCG (If Applicable): No results found for: PREGTESTUR, PREGSERUM, HCG, HCGQUANT     ABGs: No results found for: PHART, PO2ART, XSO7EXZ, SIJ1QNO, BEART, G1YSUCVL     Type & Screen (If Applicable):  No results found for: LABABO, LABRH    Drug/Infectious Status (If Applicable):  No results found for: HIV, HEPCAB    COVID-19 Screening (If Applicable):   Lab Results   Component Value Date/Time    COVID19 Not Detected 12/01/2021 12:48 PM           Anesthesia Evaluation  Patient summary reviewed and Nursing notes reviewed  Airway: Mallampati: II  TM distance: >3 FB Neck ROM: full  Mouth opening: > = 3 FB   Dental:          Pulmonary: breath sounds clear to auscultation  (+) shortness of breath:                             Cardiovascular:  Exercise tolerance: good (>4 METS),   (+) CHF: systolic, CRAVEN:,         Rhythm: regular  Rate: normal                 ROS comment: EF 45%     Neuro/Psych:   (+) psychiatric history:            GI/Hepatic/Renal:   (+) GERD:,           Endo/Other:                     Abdominal:             Vascular: Other Findings:           Anesthesia Plan      TIVA     ASA 3       Induction: intravenous. Anesthetic plan and risks discussed with patient. Plan discussed with CRNA.                     Emily Flores MD   1/12/2023

## 2023-01-12 NOTE — PROGRESS NOTES
[address]ley Duane Dr Macarthur Lawman at bedside for film    Resulted:  Narrative   Chest portable 2319       HISTORY: Port revision           Impression       Left subclavian venous port tip in the mid SVC. No pneumothorax. Mild left basilar atelectasis. Right lung is clear.        Normal cardiomediastinal silhouette       Patient declined PO wanting to leave

## 2023-01-12 NOTE — DISCHARGE INSTRUCTIONS
Diet:   May resume regular diet    Wound Care:   Surgical grade glue was used on your incision, this will aid in the healing of your incision. It will peel off on its own within 10 days. If it does not peel off in 10 days, you may use petroleum jelly to gently remove it. Do not soak or scrub area of incision for 7-10 days. Activity:   No heavy lifting greater than a milk jug, or 8 lbs until follow up. Pain management: For mild to moderate pain you may take over-the-counter Tylenol or Motrin as directed on the packaging. Do not take more than 4000 mg acetaminophen (the active ingredient in tylenol) per day. Return if:   Call/ Return to ED for increased redness, worsening pain, drainage from wound, or fevers greater than 101.5 F. Please follow up with Dr. Blanca Gaytan at your usual time interval       Nae      Dr Blanca Gaytan    1/12/2023      [address] [place] [address]    There are potential side effects of anesthesia or sedation you may experience for the first 24 hours. These side effects include:    Confusion or Memory loss, Dizziness, or Delayed Reaction Times   [x]A responsible person should be with you for the next 24 hours. Do not operate any vehicles (automobiles, bicycles, motorcycles) or power tools or machinery for 24 hours. Do not sign any legal documents or make any legal decisions for 24 hours. Do not drink alcohol for 24 hours or while taking narcotic pain medication. Nausea/Diet  [x] Nausea is not uncommon after having general anesthesia. Start with light diet and progress to your normal diet as you feel like eating. However, if you experience nausea or repeated episodes of vomiting which persist beyond 12-24 hours, notify your physician. Once nausea has passed, remember to keep drinking fluids. Difficulty Passing Urine  [x]Drink extra amounts of fluid today.   Notify your physician if you have not urinated within 8 hours after your procedure or you feel uncomfortable. Irritated Throat from a Breathing Tube  [x]Drink extra amounts of fluid today. Lozenges may help. Muscle Aches  [x]You may experience some generalized body aches as your muscles recover from medications used to relax them during surgery. These will gradually subside. ACTIVITY INSTRUCTIONS:  [x]Rest today. Increase activity as tolerated. [x]No heavy lifting or strenuous activity  []No driving until cleared by your surgeon  []No driving while on narcotic pain medications or for 24 hours. Otherwise, you can drive when you can sit comfortably behind the steering wheel and can slam on the brake without it hurting or turn the wheel sharply without it hurting. Practice this while sitting in the car with it parked in your driveway before trying to drive. POST OPERATION WOUND CARE INSTRUCTIONS:       Follow instructions as instructed verbally and written by your Surgeon. Call Doctor for any questions or concerns at their office number. Someone will answer the phone 24hrs/7 days a week. MEDICATION INSTRUCTIONS:  If no prescriptions were sent home with you, you may take over the counter medications as needed for your discomfort unless your doctor directs differently    [x] You may resume all medications you were taking before surgery  [x] Give the list of your medications to your primary care physician on your next visit. Keep your med list updated and carry it with in case of emergencies. [x] If you take any blood thinning medication, such as Coumadin or Plavix, check with your surgeon on when to re-start taking it. [x] Narcotic pain medications can cause significant constipation. You may want to add a stool softener to your postoperative medication schedule or speak to your surgeon on how best to manage this side effect. NARCOTIC SAFETY:  Your pain medicine is only for you to take. Safely store your medicines.   Store pills up high and out of reach of children and pets.  Ensure safety caps are snapped tightly  Keep track of how many pills you have left    Unused medication can be disposed of by taking them to a drop-off box or take-back program that is authorized by the [facility]. Access to a site near you can be found on the [facility] [employer] website ([address] [facility]). If you have a CPAP machine, it is very important that you use it daily during all periods of sleep and daytime rest during your recovery at home. Surgery and Anesthesia place a significant amount of stress on your body. Using your CPAP will help keep you safe and lessen the negative effects of that stress. If you smoke STOP. Smoking can interfere with healing and your respiratory health after surgery. We care about your health! Watch for these significant complications. Call physician if they or any other problems occur:  Fever over 101 F°    Redness, swelling, hardness or warmth at the operative site  Unrelieved nausea    Foul smelling or cloudy drainage at the operative site   Unrelieved pain after taking medications as prescribed by your doctor    Blood soaked dressing. (Some oozing may be normal)  Numb, pale, blue, cold or tingling extremity  You have prolonged anesthesia/sedation side effects          FAQs  (frequently asked questions)  About Surgical Site Infections    What is a Surgical Site Infection (SSI)? A surgical site infection is an infection that occurs after surgery in the part of the body where the surgery took place. Most patients who have surgery do not develop an infection. However, infections develop in about 1 to 3 out of every 100 patients who have surgery. Some common symptoms of a surgical site infection are:   Redness and pain around the area where you had surgery  Drainage of cloudy fluid from your surgical wound   Fever    Can SSIs be treated? Yes. Most surgical site infections can be treated with antibiotics.   The antibiotic given to you depends on the bacteria (germs) causing the infection. Sometimes patients with SSIs also need another surgery to treat the infection. What are some of the things that hospitals are doing to prevent SSIs? To prevent SSIs, doctors, nurses and other healthcare providers:   Clean their hands and arms up to their elbows with an antiseptic agent just before the surgery. Clean their hands with soap and water or an alcohol-based hand rub before and after caring for each patient. May remove some of your hair immediately before your surgery using electric clippers if the hair is in the same area where the procedure will occur. They should not shave you with a razor. Wear special hair covers, masks, gowns, and gloves during surgery to keep the surgery area clean. Give you antibiotics before your surgery starts. In most cases, you should get antibiotics within 60 minutes before the surgery starts and the antibiotics should be stopped within 24 hours after surgery. Clean the skin at the site of your surgery with a special soap that kills germs. What can I do to help prevent SSIs? Before you surgery:  Tell your doctor about other medical problems you may have. Health problems such as allergies, diabetes, and obesity could affect your surgery and your treatment. Quit smoking. Patients who smoke get more infections. Talk to your doctor about how you can quit before your surgery. Do not shave near where you will have surgery. Shaving with a razor can irritate your skin and make it easier to develop an infection. At the time of your surgery:  Speak up if someone tries to shave you with a razor before surgery. Ask why you need to be shaved and talk with your surgeon if you have any concerns. Ask if you will get antibiotics before surgery.     After your surgery:  Make sure that your healthcare providers clean their hands before examining you, either with soap and water or an alcohol-based hand rub.   IF YOU DO NOT SEE YOUR PROVIDERS CLEAN THEIR HANDS, PLEASE ASK THEM TO DO SO. Family and friends who visit you should not touch the surgical wound or dressings. Family and friends should clean their hands with soap and water or an alcohol-based hand rub before and after visiting you. If you do not see them clean their hands, ask them to clean their hands. What do I need to do when I go home from the hospital?  Before you go home, your doctor nurses should explain everything you need to know about taking care of your wound. Make sure you understand how to care for your wound before you leave the hospital.    Always clean your hands before and after caring for your wound. Before you go home, make sure you know who to contact if you have questions or problems after you get home. If you have any symptoms of an infection, such as redness and pain at the surgery site, drainage, or fever, call your doctor immediately. If you have additional questions, please ask your doctor or nurse.

## 2023-01-20 DIAGNOSIS — K56.699 COLONIC STRICTURE (HCC): ICD-10-CM

## 2023-01-20 RX ORDER — ONDANSETRON 4 MG/1
TABLET, ORALLY DISINTEGRATING ORAL
Qty: 3 TABLET | Refills: 0 | OUTPATIENT
Start: 2023-01-20

## 2023-04-03 RX ORDER — ESCITALOPRAM OXALATE 10 MG/1
10 TABLET ORAL DAILY
Qty: 90 TABLET | Refills: 3 | Status: SHIPPED | OUTPATIENT
Start: 2023-04-03

## 2023-09-06 ENCOUNTER — HOSPITAL ENCOUNTER (OUTPATIENT)
Dept: CT IMAGING | Age: 78
Discharge: HOME | End: 2023-09-06
Attending: INTERNAL MEDICINE
Payer: MEDICARE

## 2023-09-06 DIAGNOSIS — C18.6 MALIGNANT NEOPLASM OF DESCENDING COLON (HCC): ICD-10-CM

## 2023-09-06 LAB
PERFORMED ON: NORMAL
POC CREATININE: 0.6 MG/DL (ref 0.6–1.2)
POC SAMPLE TYPE: NORMAL

## 2023-09-06 PROCEDURE — 6360000004 HC RX CONTRAST MEDICATION: Performed by: INTERNAL MEDICINE

## 2023-09-06 PROCEDURE — 74177 CT ABD & PELVIS W/CONTRAST: CPT

## 2023-09-06 PROCEDURE — 82565 ASSAY OF CREATININE: CPT

## 2023-09-06 RX ADMIN — IOPAMIDOL 75 ML: 755 INJECTION, SOLUTION INTRAVENOUS at 10:04

## 2023-09-06 RX ADMIN — IOPAMIDOL 50 ML: 612 INJECTION, SOLUTION INTRAVENOUS at 10:03

## 2023-09-21 ENCOUNTER — ANESTHESIA EVENT (OUTPATIENT)
Dept: ENDOSCOPY | Age: 78
End: 2023-09-21
Payer: MEDICARE

## 2023-09-22 ENCOUNTER — ANESTHESIA (OUTPATIENT)
Dept: ENDOSCOPY | Age: 78
End: 2023-09-22
Payer: MEDICARE

## 2023-09-22 ENCOUNTER — HOSPITAL ENCOUNTER (OUTPATIENT)
Age: 78
Setting detail: OUTPATIENT SURGERY
Discharge: HOME | End: 2023-09-22
Attending: INTERNAL MEDICINE | Admitting: INTERNAL MEDICINE
Payer: MEDICARE

## 2023-09-22 VITALS
OXYGEN SATURATION: 97 % | WEIGHT: 144 LBS | BODY MASS INDEX: 22.6 KG/M2 | TEMPERATURE: 96.8 F | HEIGHT: 67 IN | RESPIRATION RATE: 16 BRPM | SYSTOLIC BLOOD PRESSURE: 126 MMHG | HEART RATE: 65 BPM | DIASTOLIC BLOOD PRESSURE: 72 MMHG

## 2023-09-22 PROBLEM — K57.30 COLON, DIVERTICULOSIS: Status: ACTIVE | Noted: 2023-09-22

## 2023-09-22 PROCEDURE — 3700000000 HC ANESTHESIA ATTENDED CARE: Performed by: INTERNAL MEDICINE

## 2023-09-22 PROCEDURE — 6360000002 HC RX W HCPCS: Performed by: NURSE ANESTHETIST, CERTIFIED REGISTERED

## 2023-09-22 PROCEDURE — 2580000003 HC RX 258: Performed by: ANESTHESIOLOGY

## 2023-09-22 PROCEDURE — 3700000001 HC ADD 15 MINUTES (ANESTHESIA): Performed by: INTERNAL MEDICINE

## 2023-09-22 PROCEDURE — 7100000011 HC PHASE II RECOVERY - ADDTL 15 MIN: Performed by: INTERNAL MEDICINE

## 2023-09-22 PROCEDURE — 7100000010 HC PHASE II RECOVERY - FIRST 15 MIN: Performed by: INTERNAL MEDICINE

## 2023-09-22 PROCEDURE — 3609027000 HC COLONOSCOPY: Performed by: INTERNAL MEDICINE

## 2023-09-22 RX ORDER — SODIUM CHLORIDE, SODIUM LACTATE, POTASSIUM CHLORIDE, CALCIUM CHLORIDE 600; 310; 30; 20 MG/100ML; MG/100ML; MG/100ML; MG/100ML
INJECTION, SOLUTION INTRAVENOUS CONTINUOUS
Status: DISCONTINUED | OUTPATIENT
Start: 2023-09-22 | End: 2023-09-22 | Stop reason: HOSPADM

## 2023-09-22 RX ORDER — LIDOCAINE HYDROCHLORIDE 20 MG/ML
INJECTION, SOLUTION INTRAVENOUS PRN
Status: DISCONTINUED | OUTPATIENT
Start: 2023-09-22 | End: 2023-09-22 | Stop reason: SDUPTHER

## 2023-09-22 RX ORDER — GABAPENTIN 300 MG/1
1 CAPSULE ORAL 3 TIMES DAILY
COMMUNITY
Start: 2023-06-21

## 2023-09-22 RX ORDER — PROPOFOL 10 MG/ML
INJECTION, EMULSION INTRAVENOUS CONTINUOUS PRN
Status: DISCONTINUED | OUTPATIENT
Start: 2023-09-22 | End: 2023-09-22 | Stop reason: SDUPTHER

## 2023-09-22 RX ORDER — FUROSEMIDE 40 MG/1
40 TABLET ORAL DAILY PRN
COMMUNITY
End: 2023-09-25

## 2023-09-22 RX ORDER — PROPOFOL 10 MG/ML
INJECTION, EMULSION INTRAVENOUS PRN
Status: DISCONTINUED | OUTPATIENT
Start: 2023-09-22 | End: 2023-09-22 | Stop reason: SDUPTHER

## 2023-09-22 RX ADMIN — PROPOFOL 125 MCG/KG/MIN: 10 INJECTION, EMULSION INTRAVENOUS at 08:24

## 2023-09-22 RX ADMIN — SODIUM CHLORIDE, POTASSIUM CHLORIDE, SODIUM LACTATE AND CALCIUM CHLORIDE: 600; 310; 30; 20 INJECTION, SOLUTION INTRAVENOUS at 07:34

## 2023-09-22 RX ADMIN — PROPOFOL 50 MG: 10 INJECTION, EMULSION INTRAVENOUS at 08:24

## 2023-09-22 RX ADMIN — PROPOFOL 30 MG: 10 INJECTION, EMULSION INTRAVENOUS at 08:37

## 2023-09-22 RX ADMIN — LIDOCAINE HYDROCHLORIDE 50 MG: 20 INJECTION, SOLUTION INTRAVENOUS at 08:24

## 2023-09-22 RX ADMIN — PROPOFOL 20 MG: 10 INJECTION, EMULSION INTRAVENOUS at 08:27

## 2023-09-22 ASSESSMENT — PAIN SCALES - GENERAL
PAINLEVEL_OUTOF10: 0

## 2023-09-22 ASSESSMENT — PAIN - FUNCTIONAL ASSESSMENT: PAIN_FUNCTIONAL_ASSESSMENT: 0-10

## 2023-09-22 ASSESSMENT — ENCOUNTER SYMPTOMS: SHORTNESS OF BREATH: 1

## 2023-09-22 NOTE — PROCEDURES
Colonoscopy Procedure Note      Patient: Bobbie Curling  : [birth year]  Acct#:     Procedure: Colonoscopy     Date:  2023    Surgeon:  Aaron Viera MD,     Referring Physician:  Beata Smith MD        Preoperative Diagnosis:    78-year-old female diagnosed with colon cancer descending colon, stage III last 2022, s/p left hemicolectomy and chemotherapy. She finished taking chemo in May of this year  She is here for surveillance colonoscopy    Consent:  The patient or their legal guardian has signed a consent, and is aware of the potential risks, benefits, alternatives, and potential complications of this procedure. These include, but are not limited to hemorrhage, bleeding, post procedural pain, perforation, phlebitis, aspiration, hypotension, hypoxia, cardiovascular events such as arryhthmia, and possibly death. Additionally, the possibility of missed colonic polyps and interval colon cancer was discussed in the consent. Anesthesia: MAC anesthesia        Procedure description:   An informed consent was obtained from the patient after explanation of indications, benefits, possible risks and complications of the procedure. The patient was then taken to the endoscopy suite, placed in the left lateral decubitus position, and the above IV anesthesia was administered. A digital rectal examination was performed and revealed negative without mass, lesions or tenderness. The Olympus video colonoscope was placed in the patient's rectum under digital direction and advanced to the cecum. The cecum was identified by IC valve and appendiceal orifice. The prep was good. The ileocecal valve was identified and  intubated. The scope was then withdrawn back through the cecum, ascending, transverse, descending and sigmoid colons. Careful circumferential examination of the mucosa was performed. The scope was then withdrawn into the rectum and retroflexed.   The scope was

## 2023-09-22 NOTE — H&P
Pre-operative History and Physical    Patient: Janna Singh  :   Acct#:     History Obtained From:  patient    HISTORY OF PRESENT ILLNESS:    The patient is a 78 y.o. female  who presents with previous colon cancer    Past Medical History:        Diagnosis Date    A-fib ([address])     Age-related osteoporosis without current pathological fracture 2019    Anxiety     Cancer ([address])     colon stage 3    CHF (congestive heart failure) (HCC)     Colon polyp     Depression     History of blood transfusion     Hypoglycemia     Low blood pressure     Vitamin B12 deficiency     Wears dentures     full set    Wears glasses      Past Surgical History:        Procedure Laterality Date    APPENDECTOMY      CERVICAL FUSION N/A 2019    C1-2 FUSION POSTERIOR performed by April Ledesma. Amor Willett MD at [address]  2001    COLECTOMY  10/2022    Dr Sascha Winston  2012    q 5    COLONOSCOPY N/A 10/11/2022    COLONOSCOPY WITH BIOPSY performed by Bruno Vincent MD at [address] N/A 10/11/2022    COLONOSCOPY SUBMUCOSAL/spot  INJECTION performed by Bruno Vincent MD at [facility]  2000    HEMICOLECTOMY N/A 10/24/2022    LAPAROSCOPIC LEFT COLECTOMY performed by Charley Torres MD at [address] [facility] [address] [facility]  1978    PORT SURGERY Left 2022    INSERT POWER PORT performed by Charley Torres MD at [address] Left 2023    PORT REVISION performed by Charley Torres MD at [address] N/A 10/24/2022    LAPAROSCOPIC REDUCTION OF INTUSSUSCEPTION WITH FIXATION performed by Janee Fisher DO at [facility] 10/11/2022    EGD BIOPSY performed by Bruno Vincent MD at [facility] ENDOSCOPY     Medications Prior to Admission:   No current facility-administered medications on file prior to encounter.

## 2023-09-22 NOTE — ANESTHESIA POSTPROCEDURE EVALUATION
Department of Anesthesiology  Postprocedure Note    Patient: Teetee Chao  MRN: [id number]  YOB: 1945  Date of evaluation: 9/22/2023      Procedure Summary     Date: 09/22/23 Room / Location: [facility] / [place] [address] [facility]    Anesthesia Start: 0820 Anesthesia Stop: 7237    Procedure: COLONOSCOPY Diagnosis:       Malignant neoplasm of descending colon ([address])      (Malignant neoplasm of descending colon ([address]) [C18.6])    Surgeons: Roxanna Nettles MD Responsible Provider: Bayron Barbour MD    Anesthesia Type: MAC ASA Status: 3          Anesthesia Type: No value filed.     Anthony Phase I: Anthony Score: 10    Anthony Phase II: Anthony Score: 10      Anesthesia Post Evaluation    Patient location during evaluation: PACU  Patient participation: complete - patient participated  Level of consciousness: awake  Pain score: 0  Airway patency: patent  Nausea & Vomiting: no nausea  Complications: no  Cardiovascular status: hemodynamically stable  Respiratory status: acceptable  Hydration status: stable  Pain management: satisfactory to patient

## 2023-09-22 NOTE — PROGRESS NOTES
Ambulatory Surgery/Procedure Discharge Note    Vitals:    09/22/23 0900   BP: 126/72   Pulse: 65   Resp: 16   Temp:    SpO2: 97%       No intake/output data recorded. Restroom use offered before discharge. Yes    Pain assessment:  level of pain (1-10, 10 severe)  Pain Level: 0  Pt to Endoscopy recovery post Colonoscopy. Pt denies pain at this time. Pt denies nausea at this time, PO fluids refused per pt request.  Discharge instructions given to pt's daughter and she states understanding of these instructions. Pt and pt's daughter state that pt is \"ready to go. \"         Patient discharged to home/self care.  Patient discharged via wheel chair by transporter to waiting family/S.O.       9/22/2023 9:08 AM

## 2023-09-25 ENCOUNTER — OFFICE VISIT (OUTPATIENT)
Dept: FAMILY MEDICINE CLINIC | Age: 78
End: 2023-09-25

## 2023-09-25 VITALS
DIASTOLIC BLOOD PRESSURE: 70 MMHG | BODY MASS INDEX: 23.46 KG/M2 | OXYGEN SATURATION: 96 % | WEIGHT: 149.8 LBS | SYSTOLIC BLOOD PRESSURE: 110 MMHG | HEART RATE: 80 BPM

## 2023-09-25 DIAGNOSIS — R35.89 POLYURIA: ICD-10-CM

## 2023-09-25 DIAGNOSIS — R79.9 ABNORMAL BLOOD CHEMISTRY: ICD-10-CM

## 2023-09-25 DIAGNOSIS — R63.1 EXCESSIVE THIRST: Primary | ICD-10-CM

## 2023-09-25 PROBLEM — S12.100A ODONTOID FRACTURE, CLOSED, INITIAL ENCOUNTER (HCC): Status: RESOLVED | Noted: 2019-11-07 | Resolved: 2023-09-25

## 2023-09-25 LAB — HBA1C MFR BLD: 5.1 %

## 2023-09-25 ASSESSMENT — PATIENT HEALTH QUESTIONNAIRE - PHQ9
SUM OF ALL RESPONSES TO PHQ QUESTIONS 1-9: 0
SUM OF ALL RESPONSES TO PHQ9 QUESTIONS 1 & 2: 0
SUM OF ALL RESPONSES TO PHQ QUESTIONS 1-9: 0
SUM OF ALL RESPONSES TO PHQ QUESTIONS 1-9: 0
1. LITTLE INTEREST OR PLEASURE IN DOING THINGS: 0
2. FEELING DOWN, DEPRESSED OR HOPELESS: 0
SUM OF ALL RESPONSES TO PHQ QUESTIONS 1-9: 0

## 2023-09-25 ASSESSMENT — ENCOUNTER SYMPTOMS
EYE PAIN: 0
NAUSEA: 0
ABDOMINAL PAIN: 0
COUGH: 0
SORE THROAT: 0
VOMITING: 0
SHORTNESS OF BREATH: 0
RHINORRHEA: 0

## 2023-09-25 NOTE — PROGRESS NOTES
Derrek Hinson is a 78 y. o.female who presents with   Chief Complaint   Patient presents with    Other     States that she seems to be thirsty all the time and can't drink enough fluids to help   . Patient reports increased thirst with increased intake of water as well as urination. Patient reports extremely dry mouth. Reports that she has a prescription for Lasix but she does not take this because it dries her out. Social therefore she has not been taking a diuretic and she has had increased urination over the past 10 days with excessive thirst.    Urinary Frequency   This is a new problem. The current episode started 1 to 4 weeks ago. The problem occurs every urination. The problem has been unchanged. Associated symptoms include frequency. Pertinent negatives include no chills, discharge, flank pain, hematuria, hesitancy, nausea, possible pregnancy, sweats, urgency or vomiting. Review of Systems   Constitutional:  Negative for chills and fatigue. HENT:  Negative for rhinorrhea and sore throat. Eyes:  Negative for pain and visual disturbance. Respiratory:  Negative for cough and shortness of breath. Cardiovascular:  Negative for chest pain. Gastrointestinal:  Negative for abdominal pain, nausea and vomiting. Endocrine: Positive for polydipsia and polyuria. Genitourinary:  Positive for frequency. Negative for dysuria, flank pain, hematuria, hesitancy and urgency. Musculoskeletal:  Negative for myalgias. Skin:  Negative for pallor and rash. Neurological:  Negative for dizziness, light-headedness and headaches.         Past Medical History:   Diagnosis Date    A-fib [facility])     Age-related osteoporosis without current pathological fracture 11/08/2019    Anxiety     Cancer ([address])     colon stage 3    Cancer of descending colon ([address]) 10/24/2022    CHF (congestive heart failure) (HCC)     Colon polyp     Colon, diverticulosis 9/22/2023    Depression     History of blood transfusion I have reviewed your lab test results. Cholesterol remains under very good control at 121 with LDL of 58--this is at goal LDL of less than 70. HDL, which is your good cholesterol is better at 39--1 year ago it was 33. However we would like this level to be higher, at least greater than 40. Eating more fish and boosting regular exercise will help to improve HDL level. PSA is 5.96--1 year ago, it was 2.29 and 2 years ago it was 4.81. This is a screening test for prostate cancer and since it is elevated, will require further investigation  Since it has increased, I advise that we get urology evaluation. I have placed a referral for you to see Dr. Lesa Caballero urologist who sees patients both at [facility] and at [facility] make appointment to see him for further evaluation. Kidney function shows stable creatinine. Electrolytes and liver function test are normal.     Hemoglobin A1c which is the test for diabetes is normal at 5.4. Uric acid level is appropriately suppressed at 5.6--this will help to prevent gout attacks. Thyroid function test is normal.     CBC shows normal hemoglobin and normal white cell count. Therefore continue same medications     We will await urology evaluation. Brinda Ding M.D. Written by Magnolia Calle MD on 8/24/2023  9:17 AM CDT  Seen by patient Silvina Knox on 8/25/2023  4:40 PM          Result note viewed by patient under uric acid level dated 8/23/23.

## 2023-09-26 DIAGNOSIS — R63.1 EXCESSIVE THIRST: ICD-10-CM

## 2023-09-26 DIAGNOSIS — R35.89 POLYURIA: ICD-10-CM

## 2023-09-26 DIAGNOSIS — R79.9 ABNORMAL BLOOD CHEMISTRY: ICD-10-CM

## 2023-09-26 LAB
ALBUMIN SERPL-MCNC: 4 G/DL (ref 3.4–5)
ALBUMIN/GLOB SERPL: 1.8 {RATIO} (ref 1.1–2.2)
ALP SERPL-CCNC: 106 U/L (ref 40–129)
ALT SERPL-CCNC: 25 U/L (ref 10–40)
ANION GAP SERPL CALCULATED.3IONS-SCNC: 13 MMOL/L (ref 3–16)
AST SERPL-CCNC: 32 U/L (ref 15–37)
BASOPHILS # BLD: 0 K/UL (ref 0–0.2)
BASOPHILS NFR BLD: 1.1 %
BILIRUB SERPL-MCNC: 0.5 MG/DL (ref 0–1)
BUN SERPL-MCNC: 8 MG/DL (ref 7–20)
CALCIUM SERPL-MCNC: 8.9 MG/DL (ref 8.3–10.6)
CHLORIDE SERPL-SCNC: 102 MMOL/L (ref 99–110)
CO2 SERPL-SCNC: 26 MMOL/L (ref 21–32)
CREAT SERPL-MCNC: 0.7 MG/DL (ref 0.6–1.2)
DEPRECATED RDW RBC AUTO: 13.7 % (ref 12.4–15.4)
EOSINOPHIL # BLD: 0.1 K/UL (ref 0–0.6)
EOSINOPHIL NFR BLD: 3.1 %
FERRITIN SERPL IA-MCNC: 352.2 NG/ML (ref 15–150)
GFR SERPLBLD CREATININE-BSD FMLA CKD-EPI: >60 ML/MIN/{1.73_M2}
GLUCOSE SERPL-MCNC: 84 MG/DL (ref 70–99)
HCT VFR BLD AUTO: 39.6 % (ref 36–48)
HGB BLD-MCNC: 13.4 G/DL (ref 12–16)
IRON SATN MFR SERPL: 51 % (ref 15–50)
IRON SERPL-MCNC: 105 UG/DL (ref 37–145)
LYMPHOCYTES # BLD: 1.5 K/UL (ref 1–5.1)
LYMPHOCYTES NFR BLD: 46.6 %
MCH RBC QN AUTO: 32.7 PG (ref 26–34)
MCHC RBC AUTO-ENTMCNC: 33.8 G/DL (ref 31–36)
MCV RBC AUTO: 96.8 FL (ref 80–100)
MONOCYTES # BLD: 0.4 K/UL (ref 0–1.3)
MONOCYTES NFR BLD: 11.2 %
NEUTROPHILS # BLD: 1.2 K/UL (ref 1.7–7.7)
NEUTROPHILS NFR BLD: 38 %
OSMOLALITY SERPL: 302 MOSM/KG (ref 280–301)
OSMOLALITY UR: 526 MOSM/KG (ref 390–1070)
PLATELET # BLD AUTO: 184 K/UL (ref 135–450)
PMV BLD AUTO: 7.3 FL (ref 5–10.5)
POTASSIUM SERPL-SCNC: 4.6 MMOL/L (ref 3.5–5.1)
PROT SERPL-MCNC: 6.2 G/DL (ref 6.4–8.2)
RBC # BLD AUTO: 4.09 M/UL (ref 4–5.2)
SODIUM SERPL-SCNC: 141 MMOL/L (ref 136–145)
TIBC SERPL-MCNC: 206 UG/DL (ref 260–445)
WBC # BLD AUTO: 3.3 K/UL (ref 4–11)

## 2023-09-28 DIAGNOSIS — R35.89 POLYURIA: ICD-10-CM

## 2023-09-28 LAB
CREAT 24H UR-MRATE: 0.7 G/24HR (ref 0.6–1.5)
GLUCOSE 24H UR-MRATE: 0.1 G/24HR
POTASSIUM 24H UR-SRATE: 73 MMOL/24 HR
SODIUM 24H UR-SRATE: 130 MMOL/24 HR (ref 40–220)
SPECIMEN VOL 24H UR: 3100 ML
UUN UR-MCNC: 227 MG/DL (ref 800–1666)

## 2023-09-29 ENCOUNTER — TELEPHONE (OUTPATIENT)
Dept: FAMILY MEDICINE CLINIC | Age: 78
End: 2023-09-29

## 2023-09-29 DIAGNOSIS — R35.89 POLYURIA: Primary | ICD-10-CM

## 2023-10-02 ENCOUNTER — CARE COORDINATION (OUTPATIENT)
Dept: CARE COORDINATION | Age: 78
End: 2023-10-02

## 2023-10-02 NOTE — CARE COORDINATION
ACM made care coordination outreach to patient due to PCP referral.  Patient informed ACM that she found out today she has cancer and will be starting radiation on 10/18. Patient has an appointment tomorrow with PCP and would like to discuss new cancer diagnosis with PCP before starting to work with care coordination. ACM to follow up at a later time.

## 2023-10-03 ENCOUNTER — OFFICE VISIT (OUTPATIENT)
Dept: FAMILY MEDICINE CLINIC | Age: 78
End: 2023-10-03

## 2023-10-03 VITALS
HEIGHT: 68 IN | OXYGEN SATURATION: 95 % | DIASTOLIC BLOOD PRESSURE: 78 MMHG | SYSTOLIC BLOOD PRESSURE: 122 MMHG | BODY MASS INDEX: 22.43 KG/M2 | HEART RATE: 76 BPM | WEIGHT: 148 LBS

## 2023-10-03 DIAGNOSIS — C80.1 CANCER (HCC): ICD-10-CM

## 2023-10-03 DIAGNOSIS — R63.1 POLYDIPSIA: Primary | ICD-10-CM

## 2023-10-03 DIAGNOSIS — M81.0 OSTEOPOROSIS, UNSPECIFIED OSTEOPOROSIS TYPE, UNSPECIFIED PATHOLOGICAL FRACTURE PRESENCE: ICD-10-CM

## 2023-10-03 PROBLEM — Z85.038 PERSONAL HISTORY OF COLON CANCER, STAGE III: Status: RESOLVED | Noted: 2022-11-28 | Resolved: 2023-10-03

## 2023-10-03 PROBLEM — I65.29 STENOSIS OF CAROTID ARTERY: Status: ACTIVE | Noted: 2023-10-03

## 2023-10-03 PROBLEM — C18.6 CANCER OF DESCENDING COLON (HCC): Status: RESOLVED | Noted: 2022-10-24 | Resolved: 2023-10-03

## 2023-10-03 PROBLEM — H61.302 STENOSIS OF LEFT EXTERNAL AUDITORY CANAL: Status: RESOLVED | Noted: 2019-10-10 | Resolved: 2023-10-03

## 2023-10-03 PROBLEM — K56.1 INTUSSUSCEPTION OF JEJUNUM (HCC): Status: RESOLVED | Noted: 2022-10-24 | Resolved: 2023-10-03

## 2023-10-03 PROBLEM — I51.7 LVH (LEFT VENTRICULAR HYPERTROPHY): Status: ACTIVE | Noted: 2023-10-03

## 2023-10-03 PROBLEM — K57.30 COLON, DIVERTICULOSIS: Status: RESOLVED | Noted: 2023-09-22 | Resolved: 2023-10-03

## 2023-10-03 PROBLEM — K92.2 GASTROINTESTINAL HEMORRHAGE: Status: RESOLVED | Noted: 2022-09-20 | Resolved: 2023-10-03

## 2023-10-03 ASSESSMENT — ENCOUNTER SYMPTOMS
RHINORRHEA: 0
SHORTNESS OF BREATH: 0
NAUSEA: 0
COUGH: 0
ABDOMINAL PAIN: 0
SORE THROAT: 0
EYE PAIN: 0

## 2023-10-03 ASSESSMENT — PATIENT HEALTH QUESTIONNAIRE - PHQ9
6. FEELING BAD ABOUT YOURSELF - OR THAT YOU ARE A FAILURE OR HAVE LET YOURSELF OR YOUR FAMILY DOWN: 0
3. TROUBLE FALLING OR STAYING ASLEEP: 0
SUM OF ALL RESPONSES TO PHQ QUESTIONS 1-9: 1
7. TROUBLE CONCENTRATING ON THINGS, SUCH AS READING THE NEWSPAPER OR WATCHING TELEVISION: 0
2. FEELING DOWN, DEPRESSED OR HOPELESS: 1
9. THOUGHTS THAT YOU WOULD BE BETTER OFF DEAD, OR OF HURTING YOURSELF: 0
SUM OF ALL RESPONSES TO PHQ QUESTIONS 1-9: 1
1. LITTLE INTEREST OR PLEASURE IN DOING THINGS: 0
SUM OF ALL RESPONSES TO PHQ9 QUESTIONS 1 & 2: 1
SUM OF ALL RESPONSES TO PHQ QUESTIONS 1-9: 1
4. FEELING TIRED OR HAVING LITTLE ENERGY: 0
5. POOR APPETITE OR OVEREATING: 0
8. MOVING OR SPEAKING SO SLOWLY THAT OTHER PEOPLE COULD HAVE NOTICED. OR THE OPPOSITE, BEING SO FIGETY OR RESTLESS THAT YOU HAVE BEEN MOVING AROUND A LOT MORE THAN USUAL: 0
SUM OF ALL RESPONSES TO PHQ QUESTIONS 1-9: 1
10. IF YOU CHECKED OFF ANY PROBLEMS, HOW DIFFICULT HAVE THESE PROBLEMS MADE IT FOR YOU TO DO YOUR WORK, TAKE CARE OF THINGS AT HOME, OR GET ALONG WITH OTHER PEOPLE: 0

## 2023-10-03 ASSESSMENT — LIFESTYLE VARIABLES
HOW OFTEN DO YOU HAVE A DRINK CONTAINING ALCOHOL: MONTHLY OR LESS
HOW MANY STANDARD DRINKS CONTAINING ALCOHOL DO YOU HAVE ON A TYPICAL DAY: 1 OR 2

## 2023-10-03 NOTE — PROGRESS NOTES
5' 8\" (1.727 m)       Physical Exam  Constitutional:       General: She is not in acute distress. Appearance: Normal appearance. HENT:      Head: Normocephalic and atraumatic. Eyes:      Extraocular Movements: Extraocular movements intact. Conjunctiva/sclera: Conjunctivae normal.      Pupils: Pupils are equal, round, and reactive to light. Cardiovascular:      Rate and Rhythm: Normal rate and regular rhythm. Pulmonary:      Effort: Pulmonary effort is normal.      Breath sounds: Normal breath sounds. Abdominal:      General: Bowel sounds are normal.      Palpations: Abdomen is soft. Tenderness: Left CVA tenderness: CT brain. Musculoskeletal:      Cervical back: Normal range of motion. Skin:     General: Skin is warm and dry. Neurological:      General: No focal deficit present. Mental Status: She is alert and oriented to person, place, and time. Assessment/Plan:    1. Polydipsia  Referral to nephrology and endocrinology  Recent diagnosis of recurrence of cancer    2. Osteoporosis, unspecified osteoporosis type, unspecified pathological fracture presence  Referral to Endocrinology    3. Cancer  Likely Colon      No results found for this visit on 10/03/23. Return in about 4 weeks (around 10/31/2023) for medicare wellness visit.

## 2023-10-04 ENCOUNTER — TELEPHONE (OUTPATIENT)
Dept: FAMILY MEDICINE CLINIC | Age: 78
End: 2023-10-04

## 2023-10-04 NOTE — TELEPHONE ENCOUNTER
I left Flavio Rosas a message letting her know that Dr. Des Milian put in an order for a CT of the brain. I told her to call us if she has any questions or would like help with how to schedule.

## 2023-10-04 NOTE — TELEPHONE ENCOUNTER
----- Message from Justyna Orantes MD sent at 10/3/2023  7:06 PM EDT -----  Could you let her know I ordered a CT brain, I want to make sure there is nothing in her brain that could be causing her thirst issues.     Thank you

## 2023-10-06 ENCOUNTER — CARE COORDINATION (OUTPATIENT)
Dept: CARE COORDINATION | Age: 78
End: 2023-10-06

## 2023-10-06 NOTE — CARE COORDINATION
ACM called but patient did not answer. ACM left message for patient to call back and left call back number. Will handoff to primary ACM for follow up.

## 2023-10-08 ENCOUNTER — HOSPITAL ENCOUNTER (OUTPATIENT)
Dept: MRI IMAGING | Age: 78
Discharge: HOME | End: 2023-10-08
Attending: RADIOLOGY
Payer: MEDICARE

## 2023-10-08 DIAGNOSIS — C18.6 MALIGNANT NEOPLASM OF DESCENDING COLON (HCC): ICD-10-CM

## 2023-10-08 PROCEDURE — 6360000004 HC RX CONTRAST MEDICATION: Performed by: RADIOLOGY

## 2023-10-08 PROCEDURE — A9581 GADOXETATE DISODIUM INJ: HCPCS | Performed by: RADIOLOGY

## 2023-10-08 PROCEDURE — 74183 MRI ABD W/O CNTR FLWD CNTR: CPT

## 2023-10-08 RX ADMIN — GADOXETATE DISODIUM 7 ML: 181.43 INJECTION, SOLUTION INTRAVENOUS at 15:46

## 2023-10-12 ENCOUNTER — CARE COORDINATION (OUTPATIENT)
Dept: CARE COORDINATION | Age: 78
End: 2023-10-12

## 2023-10-12 NOTE — CARE COORDINATION
Pt respectfully declines need/desire to engage for [facility] at this time. Her primary focus is newly Dx CA, of which she is following closely with providers involved in her care. Extended open invitation to pt to outreach ACM in future, as needed. Pt verbalized appreciation for this outreach. Accurately recited ACM contact information. No further [facility] outreach planned at this time.

## 2023-10-27 LAB
Lab: NORMAL
REPORT: NORMAL
THIS TEST SENT TO: NORMAL

## 2024-01-25 ENCOUNTER — HOSPITAL ENCOUNTER (OUTPATIENT)
Dept: CT IMAGING | Age: 79
Discharge: HOME | End: 2024-01-25
Attending: INTERNAL MEDICINE
Payer: MEDICARE

## 2024-01-25 DIAGNOSIS — C18.6 MALIGNANT NEOPLASM OF DESCENDING COLON (HCC): ICD-10-CM

## 2024-01-25 LAB
PERFORMED ON: NORMAL
POC CREATININE: 0.8 MG/DL (ref 0.6–1.2)
POC SAMPLE TYPE: NORMAL

## 2024-01-25 PROCEDURE — 6360000004 HC RX CONTRAST MEDICATION: Performed by: INTERNAL MEDICINE

## 2024-01-25 PROCEDURE — 82565 ASSAY OF CREATININE: CPT

## 2024-01-25 PROCEDURE — 74177 CT ABD & PELVIS W/CONTRAST: CPT

## 2024-01-25 RX ADMIN — IOPAMIDOL 50 ML: 612 INJECTION, SOLUTION INTRAVENOUS at 10:30

## 2024-01-25 RX ADMIN — IOPAMIDOL 75 ML: 755 INJECTION, SOLUTION INTRAVENOUS at 10:31

## 2024-02-01 ENCOUNTER — APPOINTMENT (OUTPATIENT)
Dept: GENERAL RADIOLOGY | Age: 79
End: 2024-02-01
Payer: MEDICARE

## 2024-02-01 ENCOUNTER — HOSPITAL ENCOUNTER (EMERGENCY)
Age: 79
Discharge: HOME | End: 2024-02-01
Payer: MEDICARE

## 2024-02-01 VITALS
DIASTOLIC BLOOD PRESSURE: 77 MMHG | WEIGHT: 125 LBS | OXYGEN SATURATION: 98 % | TEMPERATURE: 97.5 F | RESPIRATION RATE: 11 BRPM | BODY MASS INDEX: 19.01 KG/M2 | SYSTOLIC BLOOD PRESSURE: 119 MMHG | HEART RATE: 75 BPM

## 2024-02-01 DIAGNOSIS — T45.1X5A ADVERSE EFFECT OF CHEMOTHERAPY, INITIAL ENCOUNTER: ICD-10-CM

## 2024-02-01 DIAGNOSIS — E86.0 MILD DEHYDRATION: Primary | ICD-10-CM

## 2024-02-01 DIAGNOSIS — I95.9 HYPOTENSION, UNSPECIFIED HYPOTENSION TYPE: ICD-10-CM

## 2024-02-01 LAB
ALBUMIN SERPL-MCNC: 3.5 G/DL (ref 3.4–5)
ALBUMIN/GLOB SERPL: 1.4 {RATIO} (ref 1.1–2.2)
ALP SERPL-CCNC: 76 U/L (ref 40–129)
ALT SERPL-CCNC: 23 U/L (ref 10–40)
ANION GAP SERPL CALCULATED.3IONS-SCNC: 9 MMOL/L (ref 3–16)
AST SERPL-CCNC: 32 U/L (ref 15–37)
BASOPHILS # BLD: 0 K/UL (ref 0–0.2)
BASOPHILS NFR BLD: 0.4 %
BILIRUB SERPL-MCNC: 0.7 MG/DL (ref 0–1)
BILIRUB UR QL STRIP.AUTO: NEGATIVE
BUN SERPL-MCNC: 16 MG/DL (ref 7–20)
CALCIUM SERPL-MCNC: 8.6 MG/DL (ref 8.3–10.6)
CHLORIDE SERPL-SCNC: 101 MMOL/L (ref 99–110)
CLARITY UR: CLEAR
CO2 SERPL-SCNC: 27 MMOL/L (ref 21–32)
COLOR UR: YELLOW
CREAT SERPL-MCNC: 0.7 MG/DL (ref 0.6–1.2)
DEPRECATED RDW RBC AUTO: 17.3 % (ref 12.4–15.4)
EOSINOPHIL # BLD: 0 K/UL (ref 0–0.6)
EOSINOPHIL NFR BLD: 0.2 %
GFR SERPLBLD CREATININE-BSD FMLA CKD-EPI: >60 ML/MIN/{1.73_M2}
GLUCOSE SERPL-MCNC: 103 MG/DL (ref 70–99)
GLUCOSE UR STRIP.AUTO-MCNC: NEGATIVE MG/DL
HCT VFR BLD AUTO: 35 % (ref 36–48)
HGB BLD-MCNC: 12 G/DL (ref 12–16)
HGB UR QL STRIP.AUTO: NEGATIVE
KETONES UR STRIP.AUTO-MCNC: NEGATIVE MG/DL
LEUKOCYTE ESTERASE UR QL STRIP.AUTO: NEGATIVE
LYMPHOCYTES # BLD: 0.8 K/UL (ref 1–5.1)
LYMPHOCYTES NFR BLD: 23.1 %
MCH RBC QN AUTO: 33.1 PG (ref 26–34)
MCHC RBC AUTO-ENTMCNC: 34.2 G/DL (ref 31–36)
MCV RBC AUTO: 97 FL (ref 80–100)
MONOCYTES # BLD: 0.1 K/UL (ref 0–1.3)
MONOCYTES NFR BLD: 4.3 %
NEUTROPHILS # BLD: 2.4 K/UL (ref 1.7–7.7)
NEUTROPHILS NFR BLD: 72 %
NITRITE UR QL STRIP.AUTO: NEGATIVE
PH UR STRIP.AUTO: 6.5 [PH] (ref 5–8)
PLATELET # BLD AUTO: 178 K/UL (ref 135–450)
PMV BLD AUTO: 6.7 FL (ref 5–10.5)
POTASSIUM SERPL-SCNC: 4.5 MMOL/L (ref 3.5–5.1)
PROT SERPL-MCNC: 6 G/DL (ref 6.4–8.2)
PROT UR STRIP.AUTO-MCNC: NEGATIVE MG/DL
RBC # BLD AUTO: 3.61 M/UL (ref 4–5.2)
SODIUM SERPL-SCNC: 137 MMOL/L (ref 136–145)
SP GR UR STRIP.AUTO: 1.01 (ref 1–1.03)
UA COMPLETE W REFLEX CULTURE PNL UR: NORMAL
UA DIPSTICK W REFLEX MICRO PNL UR: NORMAL
URN SPEC COLLECT METH UR: NORMAL
UROBILINOGEN UR STRIP-ACNC: 1 E.U./DL
WBC # BLD AUTO: 3.4 K/UL (ref 4–11)

## 2024-02-01 PROCEDURE — 81003 URINALYSIS AUTO W/O SCOPE: CPT

## 2024-02-01 PROCEDURE — 99285 EMERGENCY DEPT VISIT HI MDM: CPT

## 2024-02-01 PROCEDURE — 2580000003 HC RX 258: Performed by: PHYSICIAN ASSISTANT

## 2024-02-01 PROCEDURE — 96360 HYDRATION IV INFUSION INIT: CPT

## 2024-02-01 PROCEDURE — 93005 ELECTROCARDIOGRAM TRACING: CPT | Performed by: PHYSICIAN ASSISTANT

## 2024-02-01 PROCEDURE — 80053 COMPREHEN METABOLIC PANEL: CPT

## 2024-02-01 PROCEDURE — 85025 COMPLETE CBC W/AUTO DIFF WBC: CPT

## 2024-02-01 PROCEDURE — 71045 X-RAY EXAM CHEST 1 VIEW: CPT

## 2024-02-01 RX ORDER — SODIUM CHLORIDE, SODIUM LACTATE, POTASSIUM CHLORIDE, AND CALCIUM CHLORIDE .6; .31; .03; .02 G/100ML; G/100ML; G/100ML; G/100ML
1000 INJECTION, SOLUTION INTRAVENOUS ONCE
Status: COMPLETED | OUTPATIENT
Start: 2024-02-01 | End: 2024-02-01

## 2024-02-01 RX ADMIN — SODIUM CHLORIDE, POTASSIUM CHLORIDE, SODIUM LACTATE AND CALCIUM CHLORIDE 1000 ML: 600; 310; 30; 20 INJECTION, SOLUTION INTRAVENOUS at 14:10

## 2024-02-01 NOTE — ED PROVIDER NOTES
I was available for consultation during patient's ED stay.  Patient was cared for by YAMILE.  I did not evaluate or participate in patient care.    EKG Interpretation    Interpreted by emergency department physician    Rhythm: normal sinus   Rate: normal  Axis: normal  Ectopy: none  Conduction: normal  ST Segments: Nonspecific change  T Waves: Nonspecific changes, flattening of T waves in lateral leads  Q Waves: none    Clinical Impression: Normal sinus rhythm, no significant T wave or ST changes.  Normal NH interval, normal QRS duration, normal QT QTC.  Normal axis.  Nonspecific ST and T wave changes in inferior and lateral leads with T wave flattening that was not seen on previous EKG dated February 16, 2022 interpreted by myself.          MD Ethan Carias Stephen W, MD  02/01/24 7079    
is no distension.      Palpations: Abdomen is soft.      Tenderness: There is no abdominal tenderness. There is no guarding or rebound.   Musculoskeletal:         General: Normal range of motion.      Cervical back: Normal range of motion and neck supple. No rigidity or tenderness.      Right lower leg: No edema.      Left lower leg: No edema.   Lymphadenopathy:      Cervical: No cervical adenopathy.   Skin:     General: Skin is warm and dry.      Capillary Refill: Capillary refill takes less than 2 seconds.      Findings: No rash.   Neurological:      Mental Status: She is alert and oriented to person, place, and time. Mental status is at baseline.      Sensory: No sensory deficit.      Coordination: Coordination normal.      Comments: NIH stroke scale 0.  No facial weakness or droop.  Negative skew test.  No truncal instability.  Rapid alternating movements intact.   Psychiatric:         Mood and Affect: Mood normal.         Behavior: Behavior normal.         MEDICAL DECISION MAKING    Vitals:    Vitals:    02/01/24 1323 02/01/24 1400 02/01/24 1630 02/01/24 1645   BP:  120/76 118/72    Pulse:  81 71 71   Resp:  12 12 11   Temp: 97.5 °F (36.4 °C)      TempSrc: Oral      SpO2:  94% 97% 98%   Weight: 56.7 kg (125 lb)          LABS:  Labs Reviewed   CBC WITH AUTO DIFFERENTIAL - Abnormal; Notable for the following components:       Result Value    WBC 3.4 (*)     RBC 3.61 (*)     Hematocrit 35.0 (*)     RDW 17.3 (*)     Lymphocytes Absolute 0.8 (*)     All other components within normal limits   COMPREHENSIVE METABOLIC PANEL W/ REFLEX TO MG FOR LOW K - Abnormal; Notable for the following components:    Glucose 103 (*)     Total Protein 6.0 (*)     All other components within normal limits   URINALYSIS WITH REFLEX TO CULTURE        Remainder of labs reviewed and were negative at this time or not returned at the time of this note.    RADIOLOGY:   Non-plain film images such as CT, Ultrasound and MRI are read by the

## 2024-02-01 NOTE — DISCHARGE INSTRUCTIONS
Home in stable condition to drink plenty of fluids frequently, rest, and monitor for continued improvement.  Follow-up outpatient with your doctor for further care and treatment as needed.  Return to the ER for any emergency worsening or concern.

## 2024-02-02 LAB
EKG ATRIAL RATE: 76 BPM
EKG DIAGNOSIS: NORMAL
EKG P AXIS: 26 DEGREES
EKG P-R INTERVAL: 156 MS
EKG Q-T INTERVAL: 366 MS
EKG QRS DURATION: 84 MS
EKG QTC CALCULATION (BAZETT): 411 MS
EKG R AXIS: -24 DEGREES
EKG T AXIS: -27 DEGREES
EKG VENTRICULAR RATE: 76 BPM

## 2024-02-02 PROCEDURE — 93010 ELECTROCARDIOGRAM REPORT: CPT | Performed by: INTERNAL MEDICINE

## 2024-03-25 ENCOUNTER — HOSPITAL ENCOUNTER (OUTPATIENT)
Dept: CT IMAGING | Age: 79
Discharge: HOME | End: 2024-03-25
Attending: INTERNAL MEDICINE
Payer: MEDICARE

## 2024-03-25 DIAGNOSIS — C78.7 METASTASIS TO LIVER (HCC): ICD-10-CM

## 2024-03-25 DIAGNOSIS — R53.83 FATIGUE, UNSPECIFIED TYPE: ICD-10-CM

## 2024-03-25 DIAGNOSIS — C18.6 MALIGNANT NEOPLASM OF DESCENDING COLON (HCC): ICD-10-CM

## 2024-03-25 LAB
PERFORMED ON: ABNORMAL
POC CREATININE: 0.4 MG/DL (ref 0.6–1.2)
POC SAMPLE TYPE: ABNORMAL

## 2024-03-25 PROCEDURE — 82565 ASSAY OF CREATININE: CPT

## 2024-03-25 PROCEDURE — 6360000004 HC RX CONTRAST MEDICATION: Performed by: INTERNAL MEDICINE

## 2024-03-25 PROCEDURE — 74177 CT ABD & PELVIS W/CONTRAST: CPT

## 2024-03-25 RX ADMIN — IOPAMIDOL 75 ML: 755 INJECTION, SOLUTION INTRAVENOUS at 15:13

## 2024-04-14 RX ORDER — ESCITALOPRAM OXALATE 10 MG/1
10 TABLET ORAL DAILY
Qty: 90 TABLET | Refills: 3 | Status: SHIPPED | OUTPATIENT
Start: 2024-04-14

## 2024-04-16 ENCOUNTER — HOSPITAL ENCOUNTER (OUTPATIENT)
Dept: CT IMAGING | Age: 79
Discharge: HOME | End: 2024-04-16
Attending: INTERNAL MEDICINE
Payer: MEDICARE

## 2024-04-16 DIAGNOSIS — C18.6 MALIGNANT NEOPLASM OF DESCENDING COLON (HCC): ICD-10-CM

## 2024-04-16 DIAGNOSIS — R06.02 SHORTNESS OF BREATH: ICD-10-CM

## 2024-04-16 PROCEDURE — 82565 ASSAY OF CREATININE: CPT

## 2024-04-16 PROCEDURE — 71260 CT THORAX DX C+: CPT

## 2024-04-16 PROCEDURE — 6360000004 HC RX CONTRAST MEDICATION: Performed by: INTERNAL MEDICINE

## 2024-04-16 RX ADMIN — IOPAMIDOL 75 ML: 755 INJECTION, SOLUTION INTRAVENOUS at 15:07

## 2024-04-17 ENCOUNTER — HOSPITAL ENCOUNTER (OUTPATIENT)
Dept: VASCULAR LAB | Age: 79
Discharge: HOME | End: 2024-04-17
Attending: INTERNAL MEDICINE
Payer: MEDICARE

## 2024-04-17 DIAGNOSIS — M79.89 LEG SWELLING: ICD-10-CM

## 2024-04-17 DIAGNOSIS — C18.6 MALIGNANT NEOPLASM OF DESCENDING COLON (HCC): ICD-10-CM

## 2024-04-17 DIAGNOSIS — R06.02 SHORTNESS OF BREATH: ICD-10-CM

## 2024-04-17 LAB
PERFORMED ON: ABNORMAL
POC CREATININE: 0.4 MG/DL (ref 0.6–1.2)
POC SAMPLE TYPE: ABNORMAL

## 2024-04-17 PROCEDURE — 93970 EXTREMITY STUDY: CPT

## 2024-05-31 ENCOUNTER — HOSPITAL ENCOUNTER (OUTPATIENT)
Dept: CT IMAGING | Age: 79
Discharge: HOME | End: 2024-05-31
Attending: INTERNAL MEDICINE
Payer: MEDICARE

## 2024-05-31 DIAGNOSIS — C18.6 MALIGNANT NEOPLASM OF DESCENDING COLON (HCC): ICD-10-CM

## 2024-05-31 LAB
PERFORMED ON: NORMAL
POC CREATININE: 0.6 MG/DL (ref 0.6–1.2)
POC SAMPLE TYPE: NORMAL

## 2024-05-31 PROCEDURE — 74177 CT ABD & PELVIS W/CONTRAST: CPT

## 2024-05-31 PROCEDURE — 6360000004 HC RX CONTRAST MEDICATION: Performed by: INTERNAL MEDICINE

## 2024-05-31 PROCEDURE — 82565 ASSAY OF CREATININE: CPT

## 2024-05-31 RX ADMIN — IOPAMIDOL 50 ML: 612 INJECTION, SOLUTION INTRAVENOUS at 10:12

## 2024-05-31 RX ADMIN — IOPAMIDOL 75 ML: 755 INJECTION, SOLUTION INTRAVENOUS at 10:13

## 2024-08-30 ENCOUNTER — HOSPITAL ENCOUNTER (OUTPATIENT)
Dept: CT IMAGING | Age: 79
Discharge: HOME | End: 2024-08-30
Attending: INTERNAL MEDICINE
Payer: MEDICARE

## 2024-08-30 DIAGNOSIS — C18.6 MALIGNANT NEOPLASM OF DESCENDING COLON (HCC): ICD-10-CM

## 2024-08-30 PROCEDURE — 74177 CT ABD & PELVIS W/CONTRAST: CPT

## 2024-08-30 PROCEDURE — 6360000004 HC RX CONTRAST MEDICATION: Performed by: INTERNAL MEDICINE

## 2024-08-30 RX ORDER — IOPAMIDOL 612 MG/ML
50 INJECTION, SOLUTION INTRAVASCULAR
Status: COMPLETED | OUTPATIENT
Start: 2024-08-30 | End: 2024-08-30

## 2024-08-30 RX ORDER — IOPAMIDOL 755 MG/ML
75 INJECTION, SOLUTION INTRAVASCULAR
Status: COMPLETED | OUTPATIENT
Start: 2024-08-30 | End: 2024-08-30

## 2024-08-30 RX ADMIN — IOPAMIDOL 75 ML: 755 INJECTION, SOLUTION INTRAVENOUS at 09:53

## 2024-08-30 RX ADMIN — IOPAMIDOL 50 ML: 612 INJECTION, SOLUTION INTRAVENOUS at 09:53

## 2024-09-26 ENCOUNTER — OFFICE VISIT (OUTPATIENT)
Dept: FAMILY MEDICINE CLINIC | Age: 79
End: 2024-09-26
Payer: MEDICARE

## 2024-09-26 VITALS
BODY MASS INDEX: 23.5 KG/M2 | OXYGEN SATURATION: 95 % | SYSTOLIC BLOOD PRESSURE: 120 MMHG | DIASTOLIC BLOOD PRESSURE: 70 MMHG | WEIGHT: 146.2 LBS | HEIGHT: 66 IN | HEART RATE: 80 BPM

## 2024-09-26 DIAGNOSIS — I50.22 CHRONIC SYSTOLIC (CONGESTIVE) HEART FAILURE (HCC): ICD-10-CM

## 2024-09-26 DIAGNOSIS — R06.09 DOE (DYSPNEA ON EXERTION): Primary | ICD-10-CM

## 2024-09-26 DIAGNOSIS — Z71.89 ACP (ADVANCE CARE PLANNING): ICD-10-CM

## 2024-09-26 DIAGNOSIS — Z87.891 EX-SMOKER: ICD-10-CM

## 2024-09-26 DIAGNOSIS — D50.9 IRON DEFICIENCY ANEMIA, UNSPECIFIED IRON DEFICIENCY ANEMIA TYPE: ICD-10-CM

## 2024-09-26 DIAGNOSIS — R06.09 DOE (DYSPNEA ON EXERTION): ICD-10-CM

## 2024-09-26 PROBLEM — I75.89 ATHEROEMBOLISM OF OTHER SITE (HCC): Status: RESOLVED | Noted: 2022-09-20 | Resolved: 2024-09-26

## 2024-09-26 LAB
ALBUMIN SERPL-MCNC: 4.2 G/DL (ref 3.4–5)
ALBUMIN/GLOB SERPL: 2.1 {RATIO} (ref 1.1–2.2)
ALP SERPL-CCNC: 95 U/L (ref 40–129)
ALT SERPL-CCNC: 35 U/L (ref 10–40)
ANION GAP SERPL CALCULATED.3IONS-SCNC: 9 MMOL/L (ref 3–16)
AST SERPL-CCNC: 39 U/L (ref 15–37)
BASOPHILS # BLD: 0 K/UL (ref 0–0.2)
BASOPHILS NFR BLD: 0.7 %
BILIRUB SERPL-MCNC: 0.3 MG/DL (ref 0–1)
BUN SERPL-MCNC: 10 MG/DL (ref 7–20)
CALCIUM SERPL-MCNC: 9.7 MG/DL (ref 8.3–10.6)
CHLORIDE SERPL-SCNC: 100 MMOL/L (ref 99–110)
CO2 SERPL-SCNC: 29 MMOL/L (ref 21–32)
CREAT SERPL-MCNC: 0.8 MG/DL (ref 0.6–1.2)
D-DIMER QUANTITATIVE: 0.88 UG/ML FEU (ref 0–0.6)
DEPRECATED RDW RBC AUTO: 13.3 % (ref 12.4–15.4)
EOSINOPHIL # BLD: 0.1 K/UL (ref 0–0.6)
EOSINOPHIL NFR BLD: 1.9 %
GFR SERPLBLD CREATININE-BSD FMLA CKD-EPI: 74 ML/MIN/{1.73_M2}
GLUCOSE SERPL-MCNC: 94 MG/DL (ref 70–99)
HCT VFR BLD AUTO: 41.3 % (ref 36–48)
HGB BLD-MCNC: 13.3 G/DL (ref 12–16)
LYMPHOCYTES # BLD: 1.2 K/UL (ref 1–5.1)
LYMPHOCYTES NFR BLD: 36 %
MCH RBC QN AUTO: 31.2 PG (ref 26–34)
MCHC RBC AUTO-ENTMCNC: 32.3 G/DL (ref 31–36)
MCV RBC AUTO: 96.5 FL (ref 80–100)
MONOCYTES # BLD: 0.4 K/UL (ref 0–1.3)
MONOCYTES NFR BLD: 12.6 %
NEUTROPHILS # BLD: 1.6 K/UL (ref 1.7–7.7)
NEUTROPHILS NFR BLD: 48.8 %
NT-PROBNP SERPL-MCNC: 138 PG/ML (ref 0–449)
PLATELET # BLD AUTO: 235 K/UL (ref 135–450)
PMV BLD AUTO: 6.9 FL (ref 5–10.5)
POTASSIUM SERPL-SCNC: 5.1 MMOL/L (ref 3.5–5.1)
PROT SERPL-MCNC: 6.2 G/DL (ref 6.4–8.2)
RBC # BLD AUTO: 4.27 M/UL (ref 4–5.2)
SODIUM SERPL-SCNC: 138 MMOL/L (ref 136–145)
TSH SERPL DL<=0.005 MIU/L-ACNC: 2.15 UIU/ML (ref 0.27–4.2)
WBC # BLD AUTO: 3.2 K/UL (ref 4–11)

## 2024-09-26 PROCEDURE — 99214 OFFICE O/P EST MOD 30 MIN: CPT | Performed by: FAMILY MEDICINE

## 2024-09-26 PROCEDURE — 1124F ACP DISCUSS-NO DSCNMKR DOCD: CPT | Performed by: FAMILY MEDICINE

## 2024-09-26 SDOH — ECONOMIC STABILITY: FOOD INSECURITY: WITHIN THE PAST 12 MONTHS, YOU WORRIED THAT YOUR FOOD WOULD RUN OUT BEFORE YOU GOT MONEY TO BUY MORE.: NEVER TRUE

## 2024-09-26 SDOH — ECONOMIC STABILITY: INCOME INSECURITY: HOW HARD IS IT FOR YOU TO PAY FOR THE VERY BASICS LIKE FOOD, HOUSING, MEDICAL CARE, AND HEATING?: NOT HARD AT ALL

## 2024-09-26 SDOH — ECONOMIC STABILITY: FOOD INSECURITY: WITHIN THE PAST 12 MONTHS, THE FOOD YOU BOUGHT JUST DIDN'T LAST AND YOU DIDN'T HAVE MONEY TO GET MORE.: NEVER TRUE

## 2024-09-26 ASSESSMENT — PATIENT HEALTH QUESTIONNAIRE - PHQ9
8. MOVING OR SPEAKING SO SLOWLY THAT OTHER PEOPLE COULD HAVE NOTICED. OR THE OPPOSITE, BEING SO FIGETY OR RESTLESS THAT YOU HAVE BEEN MOVING AROUND A LOT MORE THAN USUAL: NOT AT ALL
9. THOUGHTS THAT YOU WOULD BE BETTER OFF DEAD, OR OF HURTING YOURSELF: NOT AT ALL
SUM OF ALL RESPONSES TO PHQ QUESTIONS 1-9: 0
7. TROUBLE CONCENTRATING ON THINGS, SUCH AS READING THE NEWSPAPER OR WATCHING TELEVISION: NOT AT ALL
SUM OF ALL RESPONSES TO PHQ QUESTIONS 1-9: 0
SUM OF ALL RESPONSES TO PHQ QUESTIONS 1-9: 0
5. POOR APPETITE OR OVEREATING: NOT AT ALL
SUM OF ALL RESPONSES TO PHQ QUESTIONS 1-9: 0
SUM OF ALL RESPONSES TO PHQ9 QUESTIONS 1 & 2: 0
1. LITTLE INTEREST OR PLEASURE IN DOING THINGS: NOT AT ALL
2. FEELING DOWN, DEPRESSED OR HOPELESS: NOT AT ALL
6. FEELING BAD ABOUT YOURSELF - OR THAT YOU ARE A FAILURE OR HAVE LET YOURSELF OR YOUR FAMILY DOWN: NOT AT ALL
10. IF YOU CHECKED OFF ANY PROBLEMS, HOW DIFFICULT HAVE THESE PROBLEMS MADE IT FOR YOU TO DO YOUR WORK, TAKE CARE OF THINGS AT HOME, OR GET ALONG WITH OTHER PEOPLE: NOT DIFFICULT AT ALL
3. TROUBLE FALLING OR STAYING ASLEEP: NOT AT ALL
4. FEELING TIRED OR HAVING LITTLE ENERGY: NOT AT ALL

## 2024-09-26 ASSESSMENT — ENCOUNTER SYMPTOMS
SWOLLEN GLANDS: 0
SHORTNESS OF BREATH: 1
RHINORRHEA: 0
WHEEZING: 0
SPUTUM PRODUCTION: 0
HEMOPTYSIS: 0
SORE THROAT: 0
VOMITING: 0
ORTHOPNEA: 0
ABDOMINAL PAIN: 0

## 2024-09-27 ENCOUNTER — TELEPHONE (OUTPATIENT)
Dept: PULMONOLOGY | Age: 79
End: 2024-09-27

## 2024-09-27 DIAGNOSIS — R06.09 DOE (DYSPNEA ON EXERTION): Primary | ICD-10-CM

## 2024-09-27 RX ORDER — FLUTICASONE FUROATE, UMECLIDINIUM BROMIDE AND VILANTEROL TRIFENATATE 100; 62.5; 25 UG/1; UG/1; UG/1
1 POWDER RESPIRATORY (INHALATION) DAILY
Qty: 1 EACH | Refills: 1 | Status: SHIPPED | OUTPATIENT
Start: 2024-09-27

## 2024-10-04 ENCOUNTER — TELEPHONE (OUTPATIENT)
Dept: FAMILY MEDICINE CLINIC | Age: 79
End: 2024-10-04

## 2024-10-04 NOTE — TELEPHONE ENCOUNTER
Pt stated she has been using the trelegy inhaler and it is helping but she has a lot of drainage and wondered if she can get a script for that. Please advise.

## 2024-10-22 ENCOUNTER — APPOINTMENT (OUTPATIENT)
Dept: GENERAL RADIOLOGY | Age: 79
DRG: 522 | End: 2024-10-22
Payer: MEDICARE

## 2024-10-22 ENCOUNTER — PREP FOR PROCEDURE (OUTPATIENT)
Dept: ORTHOPEDIC SURGERY | Age: 79
End: 2024-10-22

## 2024-10-22 ENCOUNTER — APPOINTMENT (OUTPATIENT)
Dept: CT IMAGING | Age: 79
DRG: 522 | End: 2024-10-22
Payer: MEDICARE

## 2024-10-22 ENCOUNTER — HOSPITAL ENCOUNTER (INPATIENT)
Age: 79
LOS: 3 days | Discharge: NURSING, REHAB OR LONG-TERM CARE | DRG: 522 | End: 2024-10-25
Attending: HOSPITALIST | Admitting: HOSPITALIST
Payer: MEDICARE

## 2024-10-22 DIAGNOSIS — S72.002A LEFT DISPLACED FEMORAL NECK FRACTURE (HCC): Primary | ICD-10-CM

## 2024-10-22 DIAGNOSIS — S72.002A CLOSED FRACTURE OF LEFT HIP, INITIAL ENCOUNTER (HCC): Primary | ICD-10-CM

## 2024-10-22 DIAGNOSIS — R91.1 PULMONARY NODULE: ICD-10-CM

## 2024-10-22 LAB
ABO + RH BLD: NORMAL
ALBUMIN SERPL-MCNC: 3.6 G/DL (ref 3.4–5)
ALBUMIN/GLOB SERPL: 1.8 {RATIO} (ref 1.1–2.2)
ALP SERPL-CCNC: 89 U/L (ref 40–129)
ALT SERPL-CCNC: 30 U/L (ref 10–40)
ANION GAP SERPL CALCULATED.3IONS-SCNC: 8 MMOL/L (ref 3–16)
AST SERPL-CCNC: 37 U/L (ref 15–37)
BASOPHILS # BLD: 0 K/UL (ref 0–0.2)
BASOPHILS NFR BLD: 0.6 %
BILIRUB SERPL-MCNC: 0.3 MG/DL (ref 0–1)
BLD GP AB SCN SERPL QL: NORMAL
BUN SERPL-MCNC: 11 MG/DL (ref 7–20)
CALCIUM SERPL-MCNC: 9 MG/DL (ref 8.3–10.6)
CHLORIDE SERPL-SCNC: 103 MMOL/L (ref 99–110)
CO2 SERPL-SCNC: 28 MMOL/L (ref 21–32)
CREAT SERPL-MCNC: 0.6 MG/DL (ref 0.6–1.2)
DEPRECATED RDW RBC AUTO: 13.6 % (ref 12.4–15.4)
EOSINOPHIL # BLD: 0.1 K/UL (ref 0–0.6)
EOSINOPHIL NFR BLD: 1.5 %
GFR SERPLBLD CREATININE-BSD FMLA CKD-EPI: 90 ML/MIN/{1.73_M2}
GLUCOSE SERPL-MCNC: 105 MG/DL (ref 70–99)
HCT VFR BLD AUTO: 38.9 % (ref 36–48)
HGB BLD-MCNC: 12.9 G/DL (ref 12–16)
LYMPHOCYTES # BLD: 0.6 K/UL (ref 1–5.1)
LYMPHOCYTES NFR BLD: 16.2 %
MCH RBC QN AUTO: 31.9 PG (ref 26–34)
MCHC RBC AUTO-ENTMCNC: 33.2 G/DL (ref 31–36)
MCV RBC AUTO: 96.4 FL (ref 80–100)
MONOCYTES # BLD: 0.4 K/UL (ref 0–1.3)
MONOCYTES NFR BLD: 9.7 %
NEUTROPHILS # BLD: 2.8 K/UL (ref 1.7–7.7)
NEUTROPHILS NFR BLD: 72 %
PLATELET # BLD AUTO: 198 K/UL (ref 135–450)
PMV BLD AUTO: 6.4 FL (ref 5–10.5)
POTASSIUM SERPL-SCNC: 4.5 MMOL/L (ref 3.5–5.1)
PROT SERPL-MCNC: 5.6 G/DL (ref 6.4–8.2)
RBC # BLD AUTO: 4.03 M/UL (ref 4–5.2)
SODIUM SERPL-SCNC: 139 MMOL/L (ref 136–145)
WBC # BLD AUTO: 3.9 K/UL (ref 4–11)

## 2024-10-22 PROCEDURE — 86901 BLOOD TYPING SEROLOGIC RH(D): CPT

## 2024-10-22 PROCEDURE — 94640 AIRWAY INHALATION TREATMENT: CPT

## 2024-10-22 PROCEDURE — 2580000003 HC RX 258: Performed by: HOSPITALIST

## 2024-10-22 PROCEDURE — 85025 COMPLETE CBC W/AUTO DIFF WBC: CPT

## 2024-10-22 PROCEDURE — 6370000000 HC RX 637 (ALT 250 FOR IP): Performed by: HOSPITALIST

## 2024-10-22 PROCEDURE — 1200000000 HC SEMI PRIVATE

## 2024-10-22 PROCEDURE — 80053 COMPREHEN METABOLIC PANEL: CPT

## 2024-10-22 PROCEDURE — 73630 X-RAY EXAM OF FOOT: CPT

## 2024-10-22 PROCEDURE — 6360000002 HC RX W HCPCS: Performed by: HOSPITALIST

## 2024-10-22 PROCEDURE — 70450 CT HEAD/BRAIN W/O DYE: CPT

## 2024-10-22 PROCEDURE — 96374 THER/PROPH/DIAG INJ IV PUSH: CPT

## 2024-10-22 PROCEDURE — 93005 ELECTROCARDIOGRAM TRACING: CPT | Performed by: PHYSICIAN ASSISTANT

## 2024-10-22 PROCEDURE — 86850 RBC ANTIBODY SCREEN: CPT

## 2024-10-22 PROCEDURE — 86900 BLOOD TYPING SEROLOGIC ABO: CPT

## 2024-10-22 PROCEDURE — 73502 X-RAY EXAM HIP UNI 2-3 VIEWS: CPT

## 2024-10-22 PROCEDURE — 71045 X-RAY EXAM CHEST 1 VIEW: CPT

## 2024-10-22 PROCEDURE — 72125 CT NECK SPINE W/O DYE: CPT

## 2024-10-22 PROCEDURE — 6360000002 HC RX W HCPCS: Performed by: PHYSICIAN ASSISTANT

## 2024-10-22 PROCEDURE — 99285 EMERGENCY DEPT VISIT HI MDM: CPT

## 2024-10-22 PROCEDURE — 6360000002 HC RX W HCPCS: Performed by: ORTHOPAEDIC SURGERY

## 2024-10-22 PROCEDURE — 94760 N-INVAS EAR/PLS OXIMETRY 1: CPT

## 2024-10-22 RX ORDER — ESCITALOPRAM OXALATE 10 MG/1
20 TABLET ORAL DAILY
Status: DISCONTINUED | OUTPATIENT
Start: 2024-10-22 | End: 2024-10-25 | Stop reason: HOSPADM

## 2024-10-22 RX ORDER — ONDANSETRON 2 MG/ML
4 INJECTION INTRAMUSCULAR; INTRAVENOUS EVERY 6 HOURS PRN
Status: DISCONTINUED | OUTPATIENT
Start: 2024-10-22 | End: 2024-10-25 | Stop reason: HOSPADM

## 2024-10-22 RX ORDER — MORPHINE SULFATE 2 MG/ML
2 INJECTION, SOLUTION INTRAMUSCULAR; INTRAVENOUS ONCE
Status: COMPLETED | OUTPATIENT
Start: 2024-10-22 | End: 2024-10-22

## 2024-10-22 RX ORDER — MIDODRINE HYDROCHLORIDE 5 MG/1
5 TABLET ORAL 2 TIMES DAILY WITH MEALS
Status: DISCONTINUED | OUTPATIENT
Start: 2024-10-22 | End: 2024-10-25 | Stop reason: HOSPADM

## 2024-10-22 RX ORDER — MAGNESIUM SULFATE IN WATER 40 MG/ML
2000 INJECTION, SOLUTION INTRAVENOUS PRN
Status: DISCONTINUED | OUTPATIENT
Start: 2024-10-22 | End: 2024-10-25 | Stop reason: HOSPADM

## 2024-10-22 RX ORDER — ESCITALOPRAM OXALATE 20 MG/1
20 TABLET ORAL DAILY
COMMUNITY

## 2024-10-22 RX ORDER — SODIUM CHLORIDE 9 MG/ML
INJECTION, SOLUTION INTRAVENOUS PRN
Status: DISCONTINUED | OUTPATIENT
Start: 2024-10-22 | End: 2024-10-23 | Stop reason: SDUPTHER

## 2024-10-22 RX ORDER — DIGOXIN 125 MCG
125 TABLET ORAL DAILY
Status: DISCONTINUED | OUTPATIENT
Start: 2024-10-22 | End: 2024-10-25 | Stop reason: HOSPADM

## 2024-10-22 RX ORDER — MIDODRINE HYDROCHLORIDE 10 MG/1
5 TABLET ORAL 3 TIMES DAILY
COMMUNITY

## 2024-10-22 RX ORDER — ACETAMINOPHEN 325 MG/1
650 TABLET ORAL EVERY 6 HOURS PRN
Status: DISCONTINUED | OUTPATIENT
Start: 2024-10-22 | End: 2024-10-25 | Stop reason: HOSPADM

## 2024-10-22 RX ORDER — POTASSIUM CHLORIDE 1500 MG/1
40 TABLET, EXTENDED RELEASE ORAL PRN
Status: DISCONTINUED | OUTPATIENT
Start: 2024-10-22 | End: 2024-10-25 | Stop reason: HOSPADM

## 2024-10-22 RX ORDER — MORPHINE SULFATE 2 MG/ML
2 INJECTION, SOLUTION INTRAMUSCULAR; INTRAVENOUS EVERY 4 HOURS PRN
Status: DISCONTINUED | OUTPATIENT
Start: 2024-10-22 | End: 2024-10-22

## 2024-10-22 RX ORDER — FUROSEMIDE 20 MG/1
20 TABLET ORAL DAILY
COMMUNITY

## 2024-10-22 RX ORDER — ACETAMINOPHEN 650 MG/1
650 SUPPOSITORY RECTAL EVERY 6 HOURS PRN
Status: DISCONTINUED | OUTPATIENT
Start: 2024-10-22 | End: 2024-10-25 | Stop reason: HOSPADM

## 2024-10-22 RX ORDER — GABAPENTIN 300 MG/1
600 CAPSULE ORAL 2 TIMES DAILY
COMMUNITY

## 2024-10-22 RX ORDER — SODIUM CHLORIDE 0.9 % (FLUSH) 0.9 %
5-40 SYRINGE (ML) INJECTION PRN
Status: DISCONTINUED | OUTPATIENT
Start: 2024-10-22 | End: 2024-10-23

## 2024-10-22 RX ORDER — LANOLIN ALCOHOL/MO/W.PET/CERES
1000 CREAM (GRAM) TOPICAL DAILY
Status: DISCONTINUED | OUTPATIENT
Start: 2024-10-22 | End: 2024-10-25 | Stop reason: HOSPADM

## 2024-10-22 RX ORDER — BUDESONIDE AND FORMOTEROL FUMARATE DIHYDRATE 80; 4.5 UG/1; UG/1
2 AEROSOL RESPIRATORY (INHALATION)
Status: DISCONTINUED | OUTPATIENT
Start: 2024-10-22 | End: 2024-10-25 | Stop reason: HOSPADM

## 2024-10-22 RX ORDER — ONDANSETRON 4 MG/1
4 TABLET, ORALLY DISINTEGRATING ORAL EVERY 8 HOURS PRN
Status: DISCONTINUED | OUTPATIENT
Start: 2024-10-22 | End: 2024-10-25 | Stop reason: HOSPADM

## 2024-10-22 RX ORDER — GABAPENTIN 300 MG/1
300 CAPSULE ORAL 2 TIMES DAILY
Status: DISCONTINUED | OUTPATIENT
Start: 2024-10-22 | End: 2024-10-25 | Stop reason: HOSPADM

## 2024-10-22 RX ORDER — POLYETHYLENE GLYCOL 3350 17 G/17G
17 POWDER, FOR SOLUTION ORAL DAILY PRN
Status: DISCONTINUED | OUTPATIENT
Start: 2024-10-22 | End: 2024-10-25 | Stop reason: HOSPADM

## 2024-10-22 RX ORDER — TRIAMCINOLONE ACETONIDE 55 UG/1
2 SPRAY, METERED NASAL DAILY
COMMUNITY

## 2024-10-22 RX ORDER — SODIUM CHLORIDE 0.9 % (FLUSH) 0.9 %
5-40 SYRINGE (ML) INJECTION EVERY 12 HOURS SCHEDULED
Status: DISCONTINUED | OUTPATIENT
Start: 2024-10-22 | End: 2024-10-23 | Stop reason: SDUPTHER

## 2024-10-22 RX ORDER — ENOXAPARIN SODIUM 100 MG/ML
40 INJECTION SUBCUTANEOUS DAILY
Status: DISCONTINUED | OUTPATIENT
Start: 2024-10-22 | End: 2024-10-23

## 2024-10-22 RX ADMIN — SODIUM CHLORIDE, PRESERVATIVE FREE 10 ML: 5 INJECTION INTRAVENOUS at 20:26

## 2024-10-22 RX ADMIN — DIGOXIN 125 MCG: 125 TABLET ORAL at 19:10

## 2024-10-22 RX ADMIN — BUDESONIDE AND FORMOTEROL FUMARATE DIHYDRATE 2 PUFF: 80; 4.5 AEROSOL RESPIRATORY (INHALATION) at 20:53

## 2024-10-22 RX ADMIN — GABAPENTIN 300 MG: 300 CAPSULE ORAL at 20:25

## 2024-10-22 RX ADMIN — MIDODRINE HYDROCHLORIDE 5 MG: 5 TABLET ORAL at 17:39

## 2024-10-22 RX ADMIN — MORPHINE SULFATE 2 MG: 2 INJECTION, SOLUTION INTRAMUSCULAR; INTRAVENOUS at 15:03

## 2024-10-22 RX ADMIN — ESCITALOPRAM OXALATE 20 MG: 10 TABLET ORAL at 19:10

## 2024-10-22 RX ADMIN — MORPHINE SULFATE 2 MG: 2 INJECTION, SOLUTION INTRAMUSCULAR; INTRAVENOUS at 13:39

## 2024-10-22 RX ADMIN — CYANOCOBALAMIN TAB 1000 MCG 1000 MCG: 1000 TAB at 19:10

## 2024-10-22 RX ADMIN — HYDROMORPHONE HYDROCHLORIDE 1 MG: 1 INJECTION, SOLUTION INTRAMUSCULAR; INTRAVENOUS; SUBCUTANEOUS at 19:10

## 2024-10-22 RX ADMIN — ACETAMINOPHEN 325MG 650 MG: 325 TABLET ORAL at 20:25

## 2024-10-22 RX ADMIN — MORPHINE SULFATE 2 MG: 2 INJECTION, SOLUTION INTRAMUSCULAR; INTRAVENOUS at 17:38

## 2024-10-22 ASSESSMENT — PAIN DESCRIPTION - LOCATION
LOCATION: HIP

## 2024-10-22 ASSESSMENT — PAIN - FUNCTIONAL ASSESSMENT
PAIN_FUNCTIONAL_ASSESSMENT: 0-10
PAIN_FUNCTIONAL_ASSESSMENT: ACTIVITIES ARE NOT PREVENTED
PAIN_FUNCTIONAL_ASSESSMENT: PREVENTS OR INTERFERES WITH MANY ACTIVE NOT PASSIVE ACTIVITIES
PAIN_FUNCTIONAL_ASSESSMENT: PREVENTS OR INTERFERES SOME ACTIVE ACTIVITIES AND ADLS
PAIN_FUNCTIONAL_ASSESSMENT: ACTIVITIES ARE NOT PREVENTED

## 2024-10-22 ASSESSMENT — PAIN DESCRIPTION - DESCRIPTORS
DESCRIPTORS: ACHING
DESCRIPTORS: THROBBING
DESCRIPTORS: THROBBING
DESCRIPTORS: SHARP
DESCRIPTORS: THROBBING

## 2024-10-22 ASSESSMENT — PAIN SCALES - GENERAL
PAINLEVEL_OUTOF10: 7
PAINLEVEL_OUTOF10: 8
PAINLEVEL_OUTOF10: 7
PAINLEVEL_OUTOF10: 10
PAINLEVEL_OUTOF10: 6
PAINLEVEL_OUTOF10: 7
PAINLEVEL_OUTOF10: 6
PAINLEVEL_OUTOF10: 6

## 2024-10-22 ASSESSMENT — PAIN DESCRIPTION - ORIENTATION
ORIENTATION: LEFT

## 2024-10-22 ASSESSMENT — PAIN DESCRIPTION - FREQUENCY
FREQUENCY: CONTINUOUS
FREQUENCY: CONTINUOUS

## 2024-10-22 ASSESSMENT — PAIN DESCRIPTION - PAIN TYPE
TYPE: ACUTE PAIN

## 2024-10-22 ASSESSMENT — PAIN DESCRIPTION - ONSET
ONSET: ON-GOING
ONSET: ON-GOING

## 2024-10-22 NOTE — ED PROVIDER NOTES
Patient's EKG shows sinus rhythm at a rate of 78, there are some flattening of T waves noted in lead aVL, otherwise there is no acute ST segment T wave otherwise appreciated     Tootie Doll MD  10/22/24 3069    
ENDOSCOPY    COLONOSCOPY N/A 09/22/2023    COLONOSCOPY performed by Macy Méndez MD at [facility] ENDOSCOPY    COLONOSCOPY  09/22/2023    Méndez-divertics,normal anastamosis    GASTRIC BYPASS SURGERY  01/01/2000    HEMICOLECTOMY N/A 10/24/2022    LAPAROSCOPIC LEFT COLECTOMY performed by Ismael Hudson MD at [facility] OR    HYSTERECTOMY (CERVIX STATUS UNKNOWN)  01/01/1978    PORT SURGERY Left 11/28/2022    INSERT POWER PORT performed by Ismael Hudson MD at [facility] OR    PORT SURGERY Left 01/12/2023    PORT REVISION performed by Ismael Hudson MD at [facility] OR    NYLA-EN-Y GASTRIC BYPASS N/A 10/24/2022    LAPAROSCOPIC REDUCTION OF INTUSSUSCEPTION WITH FIXATION performed by Maximilian Garcia DO at [facility] OR    TONSILLECTOMY      UPPER GASTROINTESTINAL ENDOSCOPY N/A 10/11/2022    EGD BIOPSY performed by Macy Méndez MD at [facility] ENDOSCOPY       CURRENTMEDICATIONS       Current Discharge Medication List        CONTINUE these medications which have NOT CHANGED    Details   midodrine (PROAMATINE) 10 MG tablet Take 0.5 tablets by mouth 3 times daily      furosemide (LASIX) 20 MG tablet Take 1 tablet by mouth daily      escitalopram (LEXAPRO) 20 MG tablet Take 1 tablet by mouth daily      gabapentin (NEURONTIN) 300 MG capsule Take 2 capsules by mouth in the morning and at bedtime.      triamcinolone (NASACORT) 55 MCG/ACT nasal inhaler 2 sprays by Each Nostril route daily      fluticasone-umeclidin-vilant (TRELEGY ELLIPTA) 100-62.5-25 MCG/ACT AEPB inhaler Inhale 1 puff into the lungs daily  Qty: 1 each, Refills: 1      cyanocobalamin 1000 MCG tablet Take 1 tablet by mouth daily      digoxin (LANOXIN) 125 MCG tablet Take 1 tablet by mouth daily             ALLERGIES     Seasonal    FAMILYHISTORY       Family History   Problem Relation Age of Onset    Cancer Mother         lung    Cancer Father         lung    Cancer Brother         prostate        SOCIAL HISTORY       Social History     Tobacco Use    Smoking status: Former     Current

## 2024-10-22 NOTE — ED NOTES
Per Yessenia with orthopedic surgery, pt will be going to surgery tomorrow morning at 9am. Pt made aware. Pt resting in bed, call light within reach, no needs voiced at this time.

## 2024-10-22 NOTE — ED NOTES
Fall (Pt arrived via [facility] ems with c/o a fall. 10/10 pain noted to left hip. Pts left foot is slightly externally rotated. Pt states she was trying to put tomato juice in the fridge and tripped and fell over the garbage can. )     Pt laying flat in bed. Advised not to move or try to sit up until md sees her. Pt states that she understands.

## 2024-10-22 NOTE — H&P
Postfusion changes are seen at C1-C2. Scattered levels of disc space narrowing and disc space spur formation are seen.  Scattered levels of facet arthropathy are seen.  Scattered levels of foraminal narrowing are seen. No acute vertebral body fracture noted. Prevertebral soft tissues appear normal. There is a focal noncalcified nodule seen in the left upper lobe measuring 6 mm.     Postfusion change and degenerative change.  No acute osseous abnormality Left upper lobe pulmonary nodule.  This is new compared to prior.  This could be metastatic given the reported history of colon carcinoma     CT HEAD WO CONTRAST    Result Date: 10/22/2024  EXAMINATION: CT OF THE HEAD WITHOUT CONTRAST  10/22/2024 1:00 pm TECHNIQUE: CT of the head was performed without the administration of intravenous contrast. Automated exposure control, iterative reconstruction, and/or weight based adjustment of the mA/kV was utilized to reduce the radiation dose to as low as reasonably achievable. COMPARISON: None. HISTORY: ORDERING SYSTEM PROVIDED HISTORY: fall TECHNOLOGIST PROVIDED HISTORY: Has a \"code stroke\" or \"stroke alert\" been called?->No Reason for exam:->fall Decision Support Exception - unselect if not a suspected or confirmed emergency medical condition->Emergency Medical Condition (MA) Reason for Exam: FALL hx cervical fusion FINDINGS: BRAIN/VENTRICLES: There is no acute intracranial hemorrhage, mass effect or midline shift.  No abnormal extra-axial fluid collection.  The gray-white differentiation is maintained without evidence of an acute infarct.  There is no evidence of hydrocephalus. ORBITS: The visualized portion of the orbits demonstrate no acute abnormality. SINUSES: The visualized paranasal sinuses and mastoid air cells demonstrate no acute abnormality. SOFT TISSUES/SKULL:  No acute abnormality of the visualized skull or soft tissues.  Fixation hardware noted along the atlantoaxial junction.     No acute intracranial

## 2024-10-22 NOTE — ED NOTES
This RN called ED Pa requesting her to come update pt and family on plan of care. Pt currently resting flat in bed, call light within reach, Pt requesting more pain medications. RN notified ed pa.

## 2024-10-22 NOTE — ACP (ADVANCE CARE PLANNING)
Advance Care Planning   Healthcare Decision Maker:    Primary Decision Maker: Jane Chan - Child - [phone]    Secondary Decision Maker: elizabethmariannerigoberto mejia - Son-in-Law - [phone]      Today we documented Decision Maker(s) consistent with Legal Next of Kin hierarchy.       Electronically signed by LYDIA Khan on 10/22/2024 at 5:57 PM

## 2024-10-22 NOTE — ED NOTES
ED SBAR report provider to ADONIS Whitman. Patient to be transported to Room 3118 via stretcher by transport tech.Patient transported with bedside cardiac monitor. IV site clean, dry, and intact. MEWS score and pain assessed as 5/10 and documented. Patient's score on the GONZALEZ Fall scale reviewed with receiving RN. Updated patient and family on plan of care.

## 2024-10-22 NOTE — CARE COORDINATION
Case Management Assessment  Initial Evaluation    Date/Time of Evaluation: 10/22/2024 5:57 PM  Assessment Completed by: LYDIA Khan    If patient is discharged prior to next notation, then this note serves as note for discharge by case management.    Patient Name: Cindy Pérez                   YOB: 1945  Diagnosis: Pulmonary nodule [R91.1]  Left displaced femoral neck fracture (HCC) [S72.002A]  Closed fracture of left hip, initial encounter ([facility]) [S72.002A]                   Date / Time: 10/22/2024 12:16 PM    Patient Admission Status: Inpatient   Readmission Risk (Low < 19, Mod (19-27), High > 27): Readmission Risk Score: 9.9    Current PCP: Rafal Gauthier MD  PCP verified by CM? Yes    Chart Reviewed: Yes      History Provided by: Patient  Patient Orientation: Alert and Oriented    Patient Cognition: Alert    Hospitalization in the last 30 days (Readmission):  No    If yes, Readmission Assessment in CM Navigator will be completed.    Advance Directives:      Code Status: Full Code   Patient's Primary Decision Maker is: Legal Next of Kin    Primary Decision Maker: Jane Chan - Child - [phone]    Secondary Decision Maker: rigoberto lipscomb - Son-in-Law - [phone]    Discharge Planning:    Patient lives with: Alone Type of Home: House, Other (Comment) (2 family)  Primary Care Giver: Self  Patient Support Systems include: Family Members, Children   Current Financial resources: Medicare  Current community resources: None  Current services prior to admission: Durable Medical Equipment            Current DME: Cane, Walker            Type of Home Care services:  None    ADLS  Prior functional level: Independent in ADLs/IADLs  Current functional level: Assistance with the following:, Mobility, Shopping, Housework, Bathing, Dressing, Toileting    PT AM-PAC:   /24  OT AM-PAC:   /24    Family can provide assistance at DC: Yes  Would you like Case Management to discuss the

## 2024-10-23 ENCOUNTER — ANESTHESIA (OUTPATIENT)
Dept: OPERATING ROOM | Age: 79
DRG: 522 | End: 2024-10-23
Payer: MEDICARE

## 2024-10-23 ENCOUNTER — ANESTHESIA EVENT (OUTPATIENT)
Dept: OPERATING ROOM | Age: 79
DRG: 522 | End: 2024-10-23
Payer: MEDICARE

## 2024-10-23 ENCOUNTER — APPOINTMENT (OUTPATIENT)
Dept: GENERAL RADIOLOGY | Age: 79
DRG: 522 | End: 2024-10-23
Payer: MEDICARE

## 2024-10-23 LAB
ANION GAP SERPL CALCULATED.3IONS-SCNC: 9 MMOL/L (ref 3–16)
BASOPHILS # BLD: 0 K/UL (ref 0–0.2)
BASOPHILS NFR BLD: 0.3 %
BUN SERPL-MCNC: 13 MG/DL (ref 7–20)
CALCIUM SERPL-MCNC: 8.8 MG/DL (ref 8.3–10.6)
CHLORIDE SERPL-SCNC: 99 MMOL/L (ref 99–110)
CO2 SERPL-SCNC: 27 MMOL/L (ref 21–32)
CREAT SERPL-MCNC: 0.6 MG/DL (ref 0.6–1.2)
DEPRECATED RDW RBC AUTO: 13.2 % (ref 12.4–15.4)
EKG ATRIAL RATE: 78 BPM
EKG DIAGNOSIS: NORMAL
EKG P AXIS: 53 DEGREES
EKG P-R INTERVAL: 150 MS
EKG Q-T INTERVAL: 374 MS
EKG QRS DURATION: 84 MS
EKG QTC CALCULATION (BAZETT): 426 MS
EKG R AXIS: 10 DEGREES
EKG T AXIS: 49 DEGREES
EKG VENTRICULAR RATE: 78 BPM
EOSINOPHIL # BLD: 0 K/UL (ref 0–0.6)
EOSINOPHIL NFR BLD: 0.5 %
GFR SERPLBLD CREATININE-BSD FMLA CKD-EPI: 90 ML/MIN/{1.73_M2}
GLUCOSE BLD-MCNC: 125 MG/DL (ref 70–99)
GLUCOSE SERPL-MCNC: 124 MG/DL (ref 70–99)
HCT VFR BLD AUTO: 39.6 % (ref 36–48)
HGB BLD-MCNC: 13.3 G/DL (ref 12–16)
LYMPHOCYTES # BLD: 0.6 K/UL (ref 1–5.1)
LYMPHOCYTES NFR BLD: 10.2 %
MCH RBC QN AUTO: 32 PG (ref 26–34)
MCHC RBC AUTO-ENTMCNC: 33.6 G/DL (ref 31–36)
MCV RBC AUTO: 95.3 FL (ref 80–100)
MONOCYTES # BLD: 0.6 K/UL (ref 0–1.3)
MONOCYTES NFR BLD: 10.7 %
NEUTROPHILS # BLD: 4.4 K/UL (ref 1.7–7.7)
NEUTROPHILS NFR BLD: 78.3 %
PERFORMED ON: ABNORMAL
PLATELET # BLD AUTO: 201 K/UL (ref 135–450)
PMV BLD AUTO: 6.4 FL (ref 5–10.5)
POTASSIUM SERPL-SCNC: 4.1 MMOL/L (ref 3.5–5.1)
RBC # BLD AUTO: 4.16 M/UL (ref 4–5.2)
SODIUM SERPL-SCNC: 135 MMOL/L (ref 136–145)
WBC # BLD AUTO: 5.7 K/UL (ref 4–11)

## 2024-10-23 PROCEDURE — 73502 X-RAY EXAM HIP UNI 2-3 VIEWS: CPT

## 2024-10-23 PROCEDURE — 3600000015 HC SURGERY LEVEL 5 ADDTL 15MIN: Performed by: ORTHOPAEDIC SURGERY

## 2024-10-23 PROCEDURE — 3700000001 HC ADD 15 MINUTES (ANESTHESIA): Performed by: ORTHOPAEDIC SURGERY

## 2024-10-23 PROCEDURE — 6360000002 HC RX W HCPCS: Performed by: ANESTHESIOLOGY

## 2024-10-23 PROCEDURE — 6370000000 HC RX 637 (ALT 250 FOR IP): Performed by: ORTHOPAEDIC SURGERY

## 2024-10-23 PROCEDURE — 94761 N-INVAS EAR/PLS OXIMETRY MLT: CPT

## 2024-10-23 PROCEDURE — 6360000002 HC RX W HCPCS: Performed by: NURSE PRACTITIONER

## 2024-10-23 PROCEDURE — 2580000003 HC RX 258: Performed by: NURSE ANESTHETIST, CERTIFIED REGISTERED

## 2024-10-23 PROCEDURE — 2500000003 HC RX 250 WO HCPCS: Performed by: NURSE ANESTHETIST, CERTIFIED REGISTERED

## 2024-10-23 PROCEDURE — 80048 BASIC METABOLIC PNL TOTAL CA: CPT

## 2024-10-23 PROCEDURE — 94640 AIRWAY INHALATION TREATMENT: CPT

## 2024-10-23 PROCEDURE — 97530 THERAPEUTIC ACTIVITIES: CPT

## 2024-10-23 PROCEDURE — 7100000000 HC PACU RECOVERY - FIRST 15 MIN: Performed by: ORTHOPAEDIC SURGERY

## 2024-10-23 PROCEDURE — 3600000005 HC SURGERY LEVEL 5 BASE: Performed by: ORTHOPAEDIC SURGERY

## 2024-10-23 PROCEDURE — 2580000003 HC RX 258: Performed by: NURSE PRACTITIONER

## 2024-10-23 PROCEDURE — 2709999900 HC NON-CHARGEABLE SUPPLY: Performed by: ORTHOPAEDIC SURGERY

## 2024-10-23 PROCEDURE — 97162 PT EVAL MOD COMPLEX 30 MIN: CPT

## 2024-10-23 PROCEDURE — 0SRS0JZ REPLACEMENT OF LEFT HIP JOINT, FEMORAL SURFACE WITH SYNTHETIC SUBSTITUTE, OPEN APPROACH: ICD-10-PCS | Performed by: ORTHOPAEDIC SURGERY

## 2024-10-23 PROCEDURE — 93010 ELECTROCARDIOGRAM REPORT: CPT | Performed by: INTERNAL MEDICINE

## 2024-10-23 PROCEDURE — P9041 ALBUMIN (HUMAN),5%, 50ML: HCPCS | Performed by: NURSE ANESTHETIST, CERTIFIED REGISTERED

## 2024-10-23 PROCEDURE — 99223 1ST HOSP IP/OBS HIGH 75: CPT | Performed by: ORTHOPAEDIC SURGERY

## 2024-10-23 PROCEDURE — 2700000000 HC OXYGEN THERAPY PER DAY

## 2024-10-23 PROCEDURE — 6360000002 HC RX W HCPCS: Performed by: ORTHOPAEDIC SURGERY

## 2024-10-23 PROCEDURE — 2580000003 HC RX 258: Performed by: ORTHOPAEDIC SURGERY

## 2024-10-23 PROCEDURE — 3700000000 HC ANESTHESIA ATTENDED CARE: Performed by: ORTHOPAEDIC SURGERY

## 2024-10-23 PROCEDURE — 1200000000 HC SEMI PRIVATE

## 2024-10-23 PROCEDURE — A4217 STERILE WATER/SALINE, 500 ML: HCPCS | Performed by: ORTHOPAEDIC SURGERY

## 2024-10-23 PROCEDURE — C1776 JOINT DEVICE (IMPLANTABLE): HCPCS | Performed by: ORTHOPAEDIC SURGERY

## 2024-10-23 PROCEDURE — 7100000001 HC PACU RECOVERY - ADDTL 15 MIN: Performed by: ORTHOPAEDIC SURGERY

## 2024-10-23 PROCEDURE — 6360000002 HC RX W HCPCS: Performed by: NURSE ANESTHETIST, CERTIFIED REGISTERED

## 2024-10-23 PROCEDURE — 97166 OT EVAL MOD COMPLEX 45 MIN: CPT

## 2024-10-23 PROCEDURE — 85025 COMPLETE CBC W/AUTO DIFF WBC: CPT

## 2024-10-23 DEVICE — IMPLANTABLE DEVICE: Type: IMPLANTABLE DEVICE | Site: HIP | Status: FUNCTIONAL

## 2024-10-23 DEVICE — IMPLANTABLE DEVICE
Type: IMPLANTABLE DEVICE | Site: HIP | Status: FUNCTIONAL
Brand: RINGLOC BI-POLAR HIP SYSTEM

## 2024-10-23 DEVICE — IMPLANTABLE DEVICE
Type: IMPLANTABLE DEVICE | Site: HIP | Status: FUNCTIONAL
Brand: AVENIR COMPLETE™

## 2024-10-23 RX ORDER — SODIUM CHLORIDE 0.9 % (FLUSH) 0.9 %
5-40 SYRINGE (ML) INJECTION EVERY 12 HOURS SCHEDULED
Status: DISCONTINUED | OUTPATIENT
Start: 2024-10-23 | End: 2024-10-25 | Stop reason: HOSPADM

## 2024-10-23 RX ORDER — SODIUM CHLORIDE 9 MG/ML
INJECTION, SOLUTION INTRAVENOUS PRN
Status: DISCONTINUED | OUTPATIENT
Start: 2024-10-23 | End: 2024-10-25 | Stop reason: HOSPADM

## 2024-10-23 RX ORDER — ONDANSETRON 2 MG/ML
INJECTION INTRAMUSCULAR; INTRAVENOUS
Status: DISCONTINUED | OUTPATIENT
Start: 2024-10-23 | End: 2024-10-23 | Stop reason: SDUPTHER

## 2024-10-23 RX ORDER — OXYCODONE HYDROCHLORIDE 10 MG/1
10 TABLET ORAL EVERY 4 HOURS PRN
Status: DISCONTINUED | OUTPATIENT
Start: 2024-10-23 | End: 2024-10-25 | Stop reason: HOSPADM

## 2024-10-23 RX ORDER — TRANEXAMIC ACID 10 MG/ML
INJECTION, SOLUTION INTRAVENOUS
Status: DISCONTINUED | OUTPATIENT
Start: 2024-10-23 | End: 2024-10-23 | Stop reason: SDUPTHER

## 2024-10-23 RX ORDER — FENTANYL CITRATE 0.05 MG/ML
50 INJECTION, SOLUTION INTRAMUSCULAR; INTRAVENOUS EVERY 5 MIN PRN
Status: DISCONTINUED | OUTPATIENT
Start: 2024-10-23 | End: 2024-10-23 | Stop reason: HOSPADM

## 2024-10-23 RX ORDER — PROPOFOL 10 MG/ML
INJECTION, EMULSION INTRAVENOUS
Status: DISCONTINUED | OUTPATIENT
Start: 2024-10-23 | End: 2024-10-23 | Stop reason: SDUPTHER

## 2024-10-23 RX ORDER — SODIUM CHLORIDE 0.9 % (FLUSH) 0.9 %
5-40 SYRINGE (ML) INJECTION EVERY 12 HOURS SCHEDULED
Status: DISCONTINUED | OUTPATIENT
Start: 2024-10-23 | End: 2024-10-23 | Stop reason: HOSPADM

## 2024-10-23 RX ORDER — SODIUM CHLORIDE 9 MG/ML
INJECTION, SOLUTION INTRAVENOUS CONTINUOUS
Status: DISCONTINUED | OUTPATIENT
Start: 2024-10-23 | End: 2024-10-25 | Stop reason: HOSPADM

## 2024-10-23 RX ORDER — ROCURONIUM BROMIDE 10 MG/ML
INJECTION, SOLUTION INTRAVENOUS
Status: DISCONTINUED | OUTPATIENT
Start: 2024-10-23 | End: 2024-10-23 | Stop reason: SDUPTHER

## 2024-10-23 RX ORDER — MAGNESIUM HYDROXIDE 1200 MG/15ML
LIQUID ORAL CONTINUOUS PRN
Status: DISCONTINUED | OUTPATIENT
Start: 2024-10-23 | End: 2024-10-23 | Stop reason: HOSPADM

## 2024-10-23 RX ORDER — SODIUM CHLORIDE 9 MG/ML
INJECTION, SOLUTION INTRAVENOUS
Status: DISCONTINUED | OUTPATIENT
Start: 2024-10-23 | End: 2024-10-23 | Stop reason: SDUPTHER

## 2024-10-23 RX ORDER — HYDROXYZINE HYDROCHLORIDE 10 MG/1
10 TABLET, FILM COATED ORAL EVERY 8 HOURS PRN
Status: DISCONTINUED | OUTPATIENT
Start: 2024-10-23 | End: 2024-10-25 | Stop reason: HOSPADM

## 2024-10-23 RX ORDER — OXYCODONE HYDROCHLORIDE 5 MG/1
5 TABLET ORAL EVERY 4 HOURS PRN
Status: DISCONTINUED | OUTPATIENT
Start: 2024-10-23 | End: 2024-10-25 | Stop reason: HOSPADM

## 2024-10-23 RX ORDER — NALOXONE HYDROCHLORIDE 0.4 MG/ML
INJECTION, SOLUTION INTRAMUSCULAR; INTRAVENOUS; SUBCUTANEOUS PRN
Status: DISCONTINUED | OUTPATIENT
Start: 2024-10-23 | End: 2024-10-23 | Stop reason: HOSPADM

## 2024-10-23 RX ORDER — SODIUM CHLORIDE 9 MG/ML
INJECTION, SOLUTION INTRAVENOUS PRN
Status: DISCONTINUED | OUTPATIENT
Start: 2024-10-23 | End: 2024-10-23 | Stop reason: HOSPADM

## 2024-10-23 RX ORDER — ALBUMIN, HUMAN INJ 5% 5 %
SOLUTION INTRAVENOUS
Status: DISCONTINUED | OUTPATIENT
Start: 2024-10-23 | End: 2024-10-23 | Stop reason: SDUPTHER

## 2024-10-23 RX ORDER — ENOXAPARIN SODIUM 100 MG/ML
40 INJECTION SUBCUTANEOUS DAILY
Status: DISCONTINUED | OUTPATIENT
Start: 2024-10-24 | End: 2024-10-25 | Stop reason: HOSPADM

## 2024-10-23 RX ORDER — DEXAMETHASONE SODIUM PHOSPHATE 4 MG/ML
INJECTION, SOLUTION INTRA-ARTICULAR; INTRALESIONAL; INTRAMUSCULAR; INTRAVENOUS; SOFT TISSUE
Status: DISCONTINUED | OUTPATIENT
Start: 2024-10-23 | End: 2024-10-23 | Stop reason: SDUPTHER

## 2024-10-23 RX ORDER — SODIUM CHLORIDE 0.9 % (FLUSH) 0.9 %
5-40 SYRINGE (ML) INJECTION PRN
Status: DISCONTINUED | OUTPATIENT
Start: 2024-10-23 | End: 2024-10-23 | Stop reason: HOSPADM

## 2024-10-23 RX ORDER — ACETAMINOPHEN 325 MG/1
650 TABLET ORAL EVERY 6 HOURS SCHEDULED
Status: DISCONTINUED | OUTPATIENT
Start: 2024-10-23 | End: 2024-10-25 | Stop reason: HOSPADM

## 2024-10-23 RX ORDER — ONDANSETRON 2 MG/ML
4 INJECTION INTRAMUSCULAR; INTRAVENOUS
Status: DISCONTINUED | OUTPATIENT
Start: 2024-10-23 | End: 2024-10-23 | Stop reason: HOSPADM

## 2024-10-23 RX ORDER — SENNA AND DOCUSATE SODIUM 50; 8.6 MG/1; MG/1
1 TABLET, FILM COATED ORAL 2 TIMES DAILY
Status: DISCONTINUED | OUTPATIENT
Start: 2024-10-23 | End: 2024-10-25 | Stop reason: HOSPADM

## 2024-10-23 RX ORDER — SODIUM CHLORIDE 0.9 % (FLUSH) 0.9 %
5-40 SYRINGE (ML) INJECTION PRN
Status: DISCONTINUED | OUTPATIENT
Start: 2024-10-23 | End: 2024-10-25 | Stop reason: HOSPADM

## 2024-10-23 RX ORDER — MEPERIDINE HYDROCHLORIDE 25 MG/ML
12.5 INJECTION INTRAMUSCULAR; INTRAVENOUS; SUBCUTANEOUS
Status: DISCONTINUED | OUTPATIENT
Start: 2024-10-23 | End: 2024-10-23 | Stop reason: HOSPADM

## 2024-10-23 RX ORDER — FENTANYL CITRATE 50 UG/ML
INJECTION, SOLUTION INTRAMUSCULAR; INTRAVENOUS
Status: DISCONTINUED | OUTPATIENT
Start: 2024-10-23 | End: 2024-10-23 | Stop reason: SDUPTHER

## 2024-10-23 RX ORDER — FENTANYL CITRATE 0.05 MG/ML
25 INJECTION, SOLUTION INTRAMUSCULAR; INTRAVENOUS EVERY 5 MIN PRN
Status: DISCONTINUED | OUTPATIENT
Start: 2024-10-23 | End: 2024-10-23 | Stop reason: HOSPADM

## 2024-10-23 RX ADMIN — GABAPENTIN 300 MG: 300 CAPSULE ORAL at 20:18

## 2024-10-23 RX ADMIN — HYDROMORPHONE HYDROCHLORIDE 0.25 MG: 1 INJECTION, SOLUTION INTRAMUSCULAR; INTRAVENOUS; SUBCUTANEOUS at 02:59

## 2024-10-23 RX ADMIN — SENNOSIDES AND DOCUSATE SODIUM 1 TABLET: 50; 8.6 TABLET ORAL at 20:18

## 2024-10-23 RX ADMIN — SODIUM CHLORIDE: 9 INJECTION, SOLUTION INTRAVENOUS at 09:08

## 2024-10-23 RX ADMIN — FENTANYL CITRATE 50 MCG: 50 INJECTION INTRAMUSCULAR; INTRAVENOUS at 09:24

## 2024-10-23 RX ADMIN — GABAPENTIN 300 MG: 300 CAPSULE ORAL at 15:20

## 2024-10-23 RX ADMIN — DEXAMETHASONE SODIUM PHOSPHATE 8 MG: 4 INJECTION, SOLUTION INTRAMUSCULAR; INTRAVENOUS at 10:10

## 2024-10-23 RX ADMIN — FENTANYL CITRATE 25 MCG: 0.05 INJECTION, SOLUTION INTRAMUSCULAR; INTRAVENOUS at 11:01

## 2024-10-23 RX ADMIN — BUDESONIDE AND FORMOTEROL FUMARATE DIHYDRATE 2 PUFF: 80; 4.5 AEROSOL RESPIRATORY (INHALATION) at 20:11

## 2024-10-23 RX ADMIN — TRANEXAMIC ACID 1000 MG: 10 INJECTION, SOLUTION INTRAVENOUS at 09:26

## 2024-10-23 RX ADMIN — SODIUM CHLORIDE: 9 INJECTION, SOLUTION INTRAVENOUS at 15:27

## 2024-10-23 RX ADMIN — FENTANYL CITRATE 25 MCG: 50 INJECTION INTRAMUSCULAR; INTRAVENOUS at 09:14

## 2024-10-23 RX ADMIN — ROCURONIUM BROMIDE 10 MG: 10 SOLUTION INTRAVENOUS at 09:48

## 2024-10-23 RX ADMIN — FENTANYL CITRATE 25 MCG: 50 INJECTION INTRAMUSCULAR; INTRAVENOUS at 09:23

## 2024-10-23 RX ADMIN — ACETAMINOPHEN 325MG 650 MG: 325 TABLET ORAL at 15:21

## 2024-10-23 RX ADMIN — ESCITALOPRAM OXALATE 20 MG: 10 TABLET ORAL at 15:21

## 2024-10-23 RX ADMIN — HYDROMORPHONE HYDROCHLORIDE 1 MG: 1 INJECTION, SOLUTION INTRAMUSCULAR; INTRAVENOUS; SUBCUTANEOUS at 07:01

## 2024-10-23 RX ADMIN — SODIUM CHLORIDE 2000 MG: 900 INJECTION INTRAVENOUS at 09:10

## 2024-10-23 RX ADMIN — ONDANSETRON 4 MG: 2 INJECTION INTRAMUSCULAR; INTRAVENOUS at 10:15

## 2024-10-23 RX ADMIN — OXYCODONE 5 MG: 5 TABLET ORAL at 15:20

## 2024-10-23 RX ADMIN — SUGAMMADEX 200 MG: 100 INJECTION, SOLUTION INTRAVENOUS at 10:20

## 2024-10-23 RX ADMIN — TRANEXAMIC ACID 1000 MG: 10 INJECTION, SOLUTION INTRAVENOUS at 10:13

## 2024-10-23 RX ADMIN — WATER 2000 MG: 1 INJECTION INTRAMUSCULAR; INTRAVENOUS; SUBCUTANEOUS at 17:50

## 2024-10-23 RX ADMIN — DIGOXIN 125 MCG: 125 TABLET ORAL at 15:21

## 2024-10-23 RX ADMIN — PROPOFOL 60 MG: 10 INJECTION, EMULSION INTRAVENOUS at 09:16

## 2024-10-23 RX ADMIN — OXYCODONE 5 MG: 5 TABLET ORAL at 20:18

## 2024-10-23 RX ADMIN — ALBUMIN (HUMAN) 500 ML: 12.5 INJECTION, SOLUTION INTRAVENOUS at 09:48

## 2024-10-23 RX ADMIN — ROCURONIUM BROMIDE 40 MG: 10 SOLUTION INTRAVENOUS at 09:17

## 2024-10-23 ASSESSMENT — PAIN DESCRIPTION - PAIN TYPE
TYPE: ACUTE PAIN
TYPE: ACUTE PAIN

## 2024-10-23 ASSESSMENT — PAIN - FUNCTIONAL ASSESSMENT
PAIN_FUNCTIONAL_ASSESSMENT: PREVENTS OR INTERFERES SOME ACTIVE ACTIVITIES AND ADLS
PAIN_FUNCTIONAL_ASSESSMENT: 0-10
PAIN_FUNCTIONAL_ASSESSMENT: PREVENTS OR INTERFERES SOME ACTIVE ACTIVITIES AND ADLS
PAIN_FUNCTIONAL_ASSESSMENT: PREVENTS OR INTERFERES SOME ACTIVE ACTIVITIES AND ADLS

## 2024-10-23 ASSESSMENT — PAIN DESCRIPTION - LOCATION
LOCATION: LEG;HIP
LOCATION: HIP
LOCATION: HIP;LEG
LOCATION: HIP

## 2024-10-23 ASSESSMENT — PAIN SCALES - GENERAL
PAINLEVEL_OUTOF10: 3
PAINLEVEL_OUTOF10: 8
PAINLEVEL_OUTOF10: 4
PAINLEVEL_OUTOF10: 5
PAINLEVEL_OUTOF10: 2
PAINLEVEL_OUTOF10: 7
PAINLEVEL_OUTOF10: 4
PAINLEVEL_OUTOF10: 3
PAINLEVEL_OUTOF10: 0

## 2024-10-23 ASSESSMENT — PAIN DESCRIPTION - DESCRIPTORS
DESCRIPTORS: ACHING
DESCRIPTORS: DISCOMFORT;SHARP
DESCRIPTORS: DISCOMFORT
DESCRIPTORS: ACHING
DESCRIPTORS: ACHING

## 2024-10-23 ASSESSMENT — PAIN DESCRIPTION - ORIENTATION
ORIENTATION: LEFT

## 2024-10-23 ASSESSMENT — PAIN DESCRIPTION - FREQUENCY
FREQUENCY: INTERMITTENT
FREQUENCY: INTERMITTENT

## 2024-10-23 ASSESSMENT — PAIN DESCRIPTION - ONSET
ONSET: GRADUAL
ONSET: ON-GOING

## 2024-10-23 ASSESSMENT — ENCOUNTER SYMPTOMS: SHORTNESS OF BREATH: 1

## 2024-10-23 NOTE — ANESTHESIA PRE PROCEDURE
Department of Anesthesiology  Preprocedure Note       Name:  Cindy Pérez   Age:  79 y.o.  :  1945                                          MRN:  [id number]         Date:  10/23/2024      Surgeon: Surgeon(s):  Cameron Wu MD    Procedure: Procedure(s):  HIP HEMIARTHROPLASTY- LEFT    Medications prior to admission:   Prior to Admission medications    Medication Sig Start Date End Date Taking? Authorizing Provider   midodrine (PROAMATINE) 10 MG tablet Take 0.5 tablets by mouth 3 times daily   Yes Keisha Calixto MD   furosemide (LASIX) 20 MG tablet Take 1 tablet by mouth daily   Yes Keisha Calixto MD   escitalopram (LEXAPRO) 20 MG tablet Take 1 tablet by mouth daily   Yes Keisha Calixto MD   gabapentin (NEURONTIN) 300 MG capsule Take 2 capsules by mouth in the morning and at bedtime.   Yes ProviderKeisha MD   triamcinolone (NASACORT) 55 MCG/ACT nasal inhaler 2 sprays by Each Nostril route daily   Yes Keisha Calixto MD   fluticasone-umeclidin-vilant (TRELEGY ELLIPTA) 100-62.5-25 MCG/ACT AEPB inhaler Inhale 1 puff into the lungs daily 24  Yes Rafal Gauthier MD   cyanocobalamin 1000 MCG tablet Take 1 tablet by mouth daily   Yes Keisha Calixto MD   digoxin (LANOXIN) 125 MCG tablet Take 1 tablet by mouth daily   Yes ProviderKeisha MD       Current medications:    Current Facility-Administered Medications   Medication Dose Route Frequency Provider Last Rate Last Admin   • ceFAZolin (ANCEF) 2,000 mg in sodium chloride 0.9 % 50 mL IVPB (mini-bag)  2,000 mg IntraVENous On Call to OR Pallavi Roman APRN - CNP       • vitamin B-12 (CYANOCOBALAMIN) tablet 1,000 mcg  1,000 mcg Oral Daily Silvia Díaz MD   1,000 mcg at 10/22/24 1910   • digoxin (LANOXIN) tablet 125 mcg  125 mcg Oral Daily Silvia Díaz MD   125 mcg at 10/22/24 1910   • escitalopram (LEXAPRO) tablet 20 mg  20 mg Oral Daily Silvia Díaz MD   20 mg at

## 2024-10-23 NOTE — CARE COORDINATION
The Plan for Transition of Care is related to the following treatment goals:     Acute rehab at 5-7 days per week.    The Patient and/or patient representative was provided with a choice of provider and agrees   with the discharge plan. [x] Yes [] No    Freedom of choice list was provided with basic dialogue that supports the patient's individualized plan of care/goals, treatment preferences and shares the quality data associated with the providers. [x] Yes [] No    VAMSI spoke to daughter Jane today at [phone]. SW explored freedom of choice list and she would prefer mom to rehab at [facility]. VAMSI reached out to MD for orders.    Respectfully submitted,    Geno IBARRA, JASMIN  [facility]   [phone]    Electronically signed by STACEY Nuñez, JAIROW on 10/23/2024 at 6:23 PM

## 2024-10-23 NOTE — ANESTHESIA POSTPROCEDURE EVALUATION
Department of Anesthesiology  Postprocedure Note    Patient: Cindy Pérez  MRN: [id number]  YOB: 1945  Date of evaluation: 10/23/2024    Procedure Summary       Date: 10/23/24 Room / Location: [address]    Anesthesia Start: 0908 Anesthesia Stop: 1046    Procedure: HIP HEMIARTHROPLASTY- LEFT (Left: Hip) Diagnosis:       Hip fracture, left (HCC)      (Hip fracture, left (HCC) [S72.002A])    Surgeons: Cameron Wu MD Responsible Provider: Ramirez Lynch MD    Anesthesia Type: general ASA Status: 3            Anesthesia Type: No value filed.    Anthony Phase I: Anthony Score: 9    Anthony Phase II:      Anesthesia Post Evaluation    Patient location during evaluation: PACU  Patient participation: complete - patient participated  Level of consciousness: awake and alert  Pain score: 2  Airway patency: patent  Nausea & Vomiting: no nausea and no vomiting  Cardiovascular status: blood pressure returned to baseline  Respiratory status: acceptable  Hydration status: euvolemic  Pain management: adequate    No notable events documented.  
Unknown if ever smoked

## 2024-10-23 NOTE — OP NOTE
Operative Note      Patient: Cindy Pérez  YOB: 1945  MRN: [id number]    Date of Procedure: 10/23/2024    Pre-Op Diagnosis Codes:      * Hip fracture, left ([facility]) [S72.002A]    Post-Op Diagnosis: Left displaced femoral neck fracture       Procedure(s):  HIP HEMIARTHROPLASTY- LEFT    Surgeon(s):  Cameron Wu MD    Assistant:   * No surgical staff found *    Anesthesia: General    Estimated Blood Loss (mL): 200     Complications: None    Specimens:   * No specimens in log *    Implants:  * No implants in log *      Drains:   Urinary Catheter 10/22/24 (Active)   $ Urethral catheter insertion Inserted for procedure 10/22/24 1552   Catheter Indications Perioperative use for selected surgical procedures 10/23/24 0458   Site Assessment Pink 10/23/24 0458   Urine Color Yellow 10/23/24 0458   Urine Appearance Clear 10/23/24 0458   Urine Odor Other (Comment) 10/23/24 0458   Collection Container Standard 10/23/24 0458   Securement Method Securing device (Describe) 10/23/24 0458   Catheter Care  Perineal wipes 10/23/24 0458   Catheter Best Practices  Drainage tube clipped to bed;Catheter secured to thigh;Tamper seal intact;Bag below bladder;Bag not on floor;Lack of dependent loop in tubing;Drainage bag less than half full 10/23/24 0458   Status Patent;Draining 10/23/24 0458   Output (mL) 600 mL 10/23/24 0458       Findings:  Infection Present At Time Of Surgery (PATOS) (choose all levels that have infection present):  No infection present  Other Findings: Mild comminution noted at the fracture site.    Detailed Description of Procedure:   PATIENT NAME:        Cindy Pérez  YOB: 1945   MEDICAL RECORD# [id number]  SURGERY DATE:      10/23/2024  SURGEON:                Cameron Wu MD      PREOPERATIVE DIAGNOSIS: left  displaced femoral neck fracture.     POSTOPERATIVE DIAGNOSIS: left displaced femoral neck fracture.     PROCEDURE: left hip bipolar hemiarthroplasty.

## 2024-10-23 NOTE — CARE COORDINATION
[facility] has accepted patient for admission; they will have a bed on 10/25. Will initiate [employer] precert after therapy notes available tomorrow.

## 2024-10-24 ENCOUNTER — APPOINTMENT (OUTPATIENT)
Dept: CT IMAGING | Age: 79
DRG: 522 | End: 2024-10-24
Payer: MEDICARE

## 2024-10-24 LAB
BASOPHILS # BLD: 0 K/UL (ref 0–0.2)
BASOPHILS NFR BLD: 0.2 %
BILIRUB UR QL STRIP.AUTO: NEGATIVE
CLARITY UR: CLEAR
COLOR UR: YELLOW
DEPRECATED RDW RBC AUTO: 13.3 % (ref 12.4–15.4)
EOSINOPHIL # BLD: 0 K/UL (ref 0–0.6)
EOSINOPHIL NFR BLD: 0.2 %
GLUCOSE UR STRIP.AUTO-MCNC: NEGATIVE MG/DL
HCT VFR BLD AUTO: 31.9 % (ref 36–48)
HGB BLD-MCNC: 10.8 G/DL (ref 12–16)
HGB UR QL STRIP.AUTO: NEGATIVE
KETONES UR STRIP.AUTO-MCNC: NEGATIVE MG/DL
LEUKOCYTE ESTERASE UR QL STRIP.AUTO: NEGATIVE
LYMPHOCYTES # BLD: 1 K/UL (ref 1–5.1)
LYMPHOCYTES NFR BLD: 13.9 %
MCH RBC QN AUTO: 32.4 PG (ref 26–34)
MCHC RBC AUTO-ENTMCNC: 33.8 G/DL (ref 31–36)
MCV RBC AUTO: 96 FL (ref 80–100)
MONOCYTES # BLD: 1 K/UL (ref 0–1.3)
MONOCYTES NFR BLD: 13.9 %
NEUTROPHILS # BLD: 5.2 K/UL (ref 1.7–7.7)
NEUTROPHILS NFR BLD: 71.8 %
NITRITE UR QL STRIP.AUTO: NEGATIVE
PH UR STRIP.AUTO: 5.5 [PH] (ref 5–8)
PLATELET # BLD AUTO: 167 K/UL (ref 135–450)
PMV BLD AUTO: 6.4 FL (ref 5–10.5)
PROT UR STRIP.AUTO-MCNC: NEGATIVE MG/DL
RBC # BLD AUTO: 3.32 M/UL (ref 4–5.2)
SP GR UR STRIP.AUTO: 1.02 (ref 1–1.03)
UA COMPLETE W REFLEX CULTURE PNL UR: NORMAL
UA DIPSTICK W REFLEX MICRO PNL UR: NORMAL
URN SPEC COLLECT METH UR: NORMAL
UROBILINOGEN UR STRIP-ACNC: 1 E.U./DL
WBC # BLD AUTO: 7.2 K/UL (ref 4–11)

## 2024-10-24 PROCEDURE — 97530 THERAPEUTIC ACTIVITIES: CPT

## 2024-10-24 PROCEDURE — 94640 AIRWAY INHALATION TREATMENT: CPT

## 2024-10-24 PROCEDURE — 2700000000 HC OXYGEN THERAPY PER DAY

## 2024-10-24 PROCEDURE — 81003 URINALYSIS AUTO W/O SCOPE: CPT

## 2024-10-24 PROCEDURE — 71260 CT THORAX DX C+: CPT

## 2024-10-24 PROCEDURE — 97110 THERAPEUTIC EXERCISES: CPT

## 2024-10-24 PROCEDURE — 94761 N-INVAS EAR/PLS OXIMETRY MLT: CPT

## 2024-10-24 PROCEDURE — 6370000000 HC RX 637 (ALT 250 FOR IP): Performed by: HOSPITALIST

## 2024-10-24 PROCEDURE — 6360000004 HC RX CONTRAST MEDICATION: Performed by: INTERNAL MEDICINE

## 2024-10-24 PROCEDURE — 6360000002 HC RX W HCPCS: Performed by: ORTHOPAEDIC SURGERY

## 2024-10-24 PROCEDURE — 6370000000 HC RX 637 (ALT 250 FOR IP): Performed by: ORTHOPAEDIC SURGERY

## 2024-10-24 PROCEDURE — 97116 GAIT TRAINING THERAPY: CPT

## 2024-10-24 PROCEDURE — 2580000003 HC RX 258: Performed by: ORTHOPAEDIC SURGERY

## 2024-10-24 PROCEDURE — 85025 COMPLETE CBC W/AUTO DIFF WBC: CPT

## 2024-10-24 PROCEDURE — 1200000000 HC SEMI PRIVATE

## 2024-10-24 PROCEDURE — 36415 COLL VENOUS BLD VENIPUNCTURE: CPT

## 2024-10-24 RX ORDER — OXYCODONE HYDROCHLORIDE 5 MG/1
5-10 TABLET ORAL EVERY 6 HOURS PRN
Qty: 40 TABLET | Refills: 0 | Status: SHIPPED | OUTPATIENT
Start: 2024-10-24 | End: 2024-10-29

## 2024-10-24 RX ORDER — IOPAMIDOL 612 MG/ML
50 INJECTION, SOLUTION INTRAVASCULAR
Status: COMPLETED | OUTPATIENT
Start: 2024-10-24 | End: 2024-10-24

## 2024-10-24 RX ORDER — ASPIRIN 81 MG/1
81 TABLET ORAL
Qty: 60 TABLET | Refills: 0 | Status: SHIPPED | OUTPATIENT
Start: 2024-10-24 | End: 2024-11-23

## 2024-10-24 RX ORDER — POLYETHYLENE GLYCOL 3350 17 G/17G
17 POWDER, FOR SOLUTION ORAL DAILY PRN
Qty: 10 PACKET | Refills: 0
Start: 2024-10-24 | End: 2024-11-03

## 2024-10-24 RX ORDER — IOPAMIDOL 755 MG/ML
75 INJECTION, SOLUTION INTRAVASCULAR
Status: DISCONTINUED | OUTPATIENT
Start: 2024-10-24 | End: 2024-10-25 | Stop reason: HOSPADM

## 2024-10-24 RX ADMIN — SENNOSIDES AND DOCUSATE SODIUM 1 TABLET: 50; 8.6 TABLET ORAL at 09:15

## 2024-10-24 RX ADMIN — SODIUM CHLORIDE, PRESERVATIVE FREE 10 ML: 5 INJECTION INTRAVENOUS at 09:15

## 2024-10-24 RX ADMIN — ACETAMINOPHEN 325MG 650 MG: 325 TABLET ORAL at 05:11

## 2024-10-24 RX ADMIN — TIOTROPIUM BROMIDE INHALATION SPRAY 2 PUFF: 3.12 SPRAY, METERED RESPIRATORY (INHALATION) at 08:30

## 2024-10-24 RX ADMIN — CYANOCOBALAMIN TAB 1000 MCG 1000 MCG: 1000 TAB at 09:15

## 2024-10-24 RX ADMIN — MIDODRINE HYDROCHLORIDE 5 MG: 5 TABLET ORAL at 09:14

## 2024-10-24 RX ADMIN — ACETAMINOPHEN 325MG 650 MG: 325 TABLET ORAL at 00:44

## 2024-10-24 RX ADMIN — DIGOXIN 125 MCG: 125 TABLET ORAL at 09:15

## 2024-10-24 RX ADMIN — SENNOSIDES AND DOCUSATE SODIUM 1 TABLET: 50; 8.6 TABLET ORAL at 20:05

## 2024-10-24 RX ADMIN — ACETAMINOPHEN 325MG 650 MG: 325 TABLET ORAL at 12:47

## 2024-10-24 RX ADMIN — OXYCODONE 5 MG: 5 TABLET ORAL at 05:11

## 2024-10-24 RX ADMIN — WATER 2000 MG: 1 INJECTION INTRAMUSCULAR; INTRAVENOUS; SUBCUTANEOUS at 00:45

## 2024-10-24 RX ADMIN — BUDESONIDE AND FORMOTEROL FUMARATE DIHYDRATE 2 PUFF: 80; 4.5 AEROSOL RESPIRATORY (INHALATION) at 21:03

## 2024-10-24 RX ADMIN — ENOXAPARIN SODIUM 40 MG: 100 INJECTION SUBCUTANEOUS at 09:15

## 2024-10-24 RX ADMIN — IOPAMIDOL 50 ML: 612 INJECTION, SOLUTION INTRAVENOUS at 17:21

## 2024-10-24 RX ADMIN — BUDESONIDE AND FORMOTEROL FUMARATE DIHYDRATE 2 PUFF: 80; 4.5 AEROSOL RESPIRATORY (INHALATION) at 08:30

## 2024-10-24 RX ADMIN — GABAPENTIN 300 MG: 300 CAPSULE ORAL at 20:05

## 2024-10-24 RX ADMIN — ESCITALOPRAM OXALATE 20 MG: 10 TABLET ORAL at 09:15

## 2024-10-24 RX ADMIN — GABAPENTIN 300 MG: 300 CAPSULE ORAL at 09:15

## 2024-10-24 RX ADMIN — OXYCODONE 5 MG: 5 TABLET ORAL at 20:05

## 2024-10-24 RX ADMIN — SODIUM CHLORIDE, PRESERVATIVE FREE 10 ML: 5 INJECTION INTRAVENOUS at 20:07

## 2024-10-24 ASSESSMENT — PAIN DESCRIPTION - DESCRIPTORS
DESCRIPTORS: ACHING
DESCRIPTORS: ACHING;SORE

## 2024-10-24 ASSESSMENT — PAIN DESCRIPTION - FREQUENCY
FREQUENCY: CONTINUOUS
FREQUENCY: CONTINUOUS
FREQUENCY: INTERMITTENT

## 2024-10-24 ASSESSMENT — PAIN DESCRIPTION - PAIN TYPE
TYPE: ACUTE PAIN
TYPE: SURGICAL PAIN
TYPE: SURGICAL PAIN

## 2024-10-24 ASSESSMENT — PAIN - FUNCTIONAL ASSESSMENT
PAIN_FUNCTIONAL_ASSESSMENT: ACTIVITIES ARE NOT PREVENTED
PAIN_FUNCTIONAL_ASSESSMENT: PREVENTS OR INTERFERES SOME ACTIVE ACTIVITIES AND ADLS

## 2024-10-24 ASSESSMENT — PAIN DESCRIPTION - LOCATION
LOCATION: HIP

## 2024-10-24 ASSESSMENT — PAIN SCALES - GENERAL
PAINLEVEL_OUTOF10: 4
PAINLEVEL_OUTOF10: 1

## 2024-10-24 ASSESSMENT — PAIN DESCRIPTION - ONSET
ONSET: ON-GOING
ONSET: GRADUAL
ONSET: ON-GOING

## 2024-10-24 ASSESSMENT — PAIN DESCRIPTION - ORIENTATION
ORIENTATION: LEFT

## 2024-10-24 NOTE — CARE COORDINATION
Noted d/c order; patient is in agreement with plan for transfer to [facility]. Precert initiated.   Will need hens and transportation.   Electronically signed by LYDIA Blank on 10/24/2024 at 1:05 PM       Hens completed.

## 2024-10-24 NOTE — DISCHARGE SUMMARY
Hospital Medicine Discharge Summary    Patient ID: Cindy Pérez      Patient's PCP: Rafal Gauthier MD    Admit Date: 10/22/2024     Discharge Date:   10/24/24    Admitting Physician: Silvia Díaz MD     Discharge Physician: Annette Hodges PA-C           Hospital Course: 79-year-old female with a past medical history of metastatic colon cancer, CHF, atrial fibrillation, GERD, urgency anemia, osteoporosis, depression who presented to the ED with left hip pain after mechanical fall.  Patient states she tripped over a garbage can and landed on her left hip.  Patient denied loss of consciousness or head injury.  While in the ED vital signs were stable.  X-ray of the left hip showed displaced left femoral neck fracture.  Orthopedics was consulted, recommended admission for surgical intervention.  Patient was admitted for further workup/treatment.  Patient underwent ORIF with orthopedics on 10/23 with no complications.  Postop H&H was stable.  Patient was able to work with PT/OT who recommended discharging to a skilled facility.  Patient and family would like [facility] as first choice, ARU consulted and currently pending.  Patient will discharge to [facility] as secondary choice.  Orthopedics cleared patient for discharge, recommend ASA 81 mg twice daily for 30 days for DVT prophylaxis.  P.o. pain medication prescribed.  She will need to follow-up with orthopedics in 2 weeks.  Information placed on [facility] and discharge paperwork.    Patient also underwent a CT scan of the cervical spine and a new left upper lobe pulmonary nodule was incidentally seen.  Due to patient's history of colon cancer, oncology was consulted.  Oncology was concerned for possible metastatic disease.  Recommended full body CT scan, timing to be determined.  Patient will need to follow-up with [facility] at discharge for further management.    Patient stated they felt well and wanted to go home.  Patient denied

## 2024-10-24 NOTE — CONSULTS
Oncology Hematology Care    Consult Note      Requesting Physician:  Ofelia Díaz MD    CHIEF COMPLAINT:  colon cancer/hip fracture      HISTORY OF PRESENT ILLNESS:    Ms. Pérez  is a 79 y.o. female we are seeing in consultation for history of colon cancer in with hip fracture. This is a patient that I follow for history of metastatic colon cancer who tripped and fell at home and suffered a left femoral neck fracture.  She is having surgery on that today.  A CT of the head and neck were done on admission that show a new left upper lobe lung nodule.  Therefore, Oncology was consulted for further workup and management.  She is otherwise doing well other than left hip pain.  In terms of her history of colorectal cancer, she was initially diagnosed with stage III disease by colonoscopy in October 2022.  She underwent a left colectomy that revealed a 4 cm adenocarcinoma with 1 out of 19 positive lymph nodes.  She finished adjuvant chemotherapy with FOLFOX in May 2023.  She then developed an isolated liver lesion in October 2023 and completed SBRT to that lesion followed by 8 cycles of FOLFIRI plus Avastin with her last February 2024.  Her CEA has been going up lately.    Past Medical History:  Past Medical History:   Diagnosis Date    A-fib (HCC)     Age-related osteoporosis without current pathological fracture 11/08/2019    Anxiety     Cancer (HCC)     colon stage 3    Cancer of descending colon (HCC) 10/24/2022    Cancer of descending colon (HCC) 10/24/2022    CHF (congestive heart failure) (HCC)     Colon polyp     Colon, diverticulosis 9/22/2023    Colon, diverticulosis 9/22/2023    Depression     Gastrointestinal hemorrhage 9/20/2022    History of blood transfusion     Hypoglycemia     Intussusception of jejunum (HCC) 10/24/2022    Low blood pressure     Personal history of colon cancer, stage 
[facility] Orthopedic Surgery  Consult Note    This patient is seen in consultation at the request of Dr Díaz    Reason for Consult:  left IT hip fx    CHIEF COMPLAINT:  left hip pain    History Obtained From:  patient, electronic medical record    HISTORY OF PRESENT ILLNESS:    The patient is a 79 y.o. female who presents with left hip pain after a fall at home today. She tripped in her kitchen on the garbage can. She is alert and oriented. States she was unable to stand and brought to ER per EMS. Pain is described in left hip and with the intensity of moderate. Pain is described as aching. Discomfort is persistent. Pain is worse with activity and better at rest. No other complaints.     Past Medical History:        Diagnosis Date    A-fib (HCC)     Age-related osteoporosis without current pathological fracture 11/08/2019    Anxiety     Cancer (HCC)     colon stage 3    Cancer of descending colon (HCC) 10/24/2022    Cancer of descending colon (HCC) 10/24/2022    CHF (congestive heart failure) (HCC)     Colon polyp     Colon, diverticulosis 9/22/2023    Colon, diverticulosis 9/22/2023    Depression     Gastrointestinal hemorrhage 9/20/2022    History of blood transfusion     Hypoglycemia     Intussusception of jejunum (HCC) 10/24/2022    Low blood pressure     Personal history of colon cancer, stage III 11/28/2022    S/P gastric bypass 2/13/2012    Vitamin B12 deficiency     Wears dentures     full set    Wears glasses        Past Surgical History:        Procedure Laterality Date    APPENDECTOMY      CERVICAL FUSION N/A 11/07/2019    C1-2 FUSION POSTERIOR performed by Qasim Casitllo MD at [facility] OR    CHOLECYSTECTOMY  01/01/2001    COLECTOMY  10/2022    Dr Hudson    COLONOSCOPY  09/01/2012    q 5    COLONOSCOPY N/A 10/11/2022    COLONOSCOPY WITH BIOPSY performed by Macy Méndez MD at [facility] ENDOSCOPY    COLONOSCOPY N/A 10/11/2022    COLONOSCOPY SUBMUCOSAL/spot  INJECTION performed by Macy Méndez MD at 
blood transfusion     Hypoglycemia     Intussusception of jejunum (HCC) 10/24/2022    Low blood pressure     Personal history of colon cancer, stage III 11/28/2022    S/P gastric bypass 2/13/2012    Vitamin B12 deficiency     Wears dentures     full set    Wears glasses        Past Surgical History:   Procedure Laterality Date    APPENDECTOMY      CERVICAL FUSION N/A 11/07/2019    C1-2 FUSION POSTERIOR performed by Qasim Castillo MD at [facility] OR    CHOLECYSTECTOMY  01/01/2001    COLECTOMY  10/2022    Dr Hudson    COLONOSCOPY  09/01/2012    q 5    COLONOSCOPY N/A 10/11/2022    COLONOSCOPY WITH BIOPSY performed by Macy Méndez MD at [facility] ENDOSCOPY    COLONOSCOPY N/A 10/11/2022    COLONOSCOPY SUBMUCOSAL/spot  INJECTION performed by Macy Méndez MD at [facility] ENDOSCOPY    COLONOSCOPY N/A 09/22/2023    COLONOSCOPY performed by Macy Méndez MD at [facility] ENDOSCOPY    COLONOSCOPY  09/22/2023    Méndez-divertics,normal anastamosis    GASTRIC BYPASS SURGERY  01/01/2000    HEMICOLECTOMY N/A 10/24/2022    LAPAROSCOPIC LEFT COLECTOMY performed by Ismael Hudson MD at [facility] OR    HIP SURGERY Left 10/23/2024    HIP HEMIARTHROPLASTY- LEFT performed by Cameron Wu MD at [facility] OR    HYSTERECTOMY (CERVIX STATUS UNKNOWN)  01/01/1978    PORT SURGERY Left 11/28/2022    INSERT POWER PORT performed by Ismael Hudson MD at [facility] OR    PORT SURGERY Left 01/12/2023    PORT REVISION performed by Ismael Hudson MD at [facility] OR    NYLA-EN-Y GASTRIC BYPASS N/A 10/24/2022    LAPAROSCOPIC REDUCTION OF INTUSSUSCEPTION WITH FIXATION performed by Maximilian Garcia DO at [facility] OR    TONSILLECTOMY      UPPER GASTROINTESTINAL ENDOSCOPY N/A 10/11/2022    EGD BIOPSY performed by Macy Méndez MD at [facility] ENDOSCOPY       Family History   Problem Relation Age of Onset    Cancer Mother         lung    Cancer Father         lung    Cancer Brother         prostate       Social History     Socioeconomic History    Marital status:

## 2024-10-24 NOTE — DISCHARGE INSTR - COC
[facility] Continuity of Care Form    Patient Name:  Cindy Pérez  : 1945    MRN:  [id number]    Admit date:  10/22/2024  Discharge date:  10/24/24    Code Status Order: Full Code  Advance Directives: No    Admitting Physician: Silvia Díaz MD  PCP: Rafal Gauthier MD  [phone]    Past Surgical History:  Past Surgical History:   Procedure Laterality Date    APPENDECTOMY      CERVICAL FUSION N/A 2019    C1-2 FUSION POSTERIOR performed by Qasim Castillo MD at [facility] OR    CHOLECYSTECTOMY  2001    COLECTOMY  10/2022    Dr Hudson    COLONOSCOPY  2012    q 5    COLONOSCOPY N/A 10/11/2022    COLONOSCOPY WITH BIOPSY performed by Macy Méndez MD at [facility] ENDOSCOPY    COLONOSCOPY N/A 10/11/2022    COLONOSCOPY SUBMUCOSAL/spot  INJECTION performed by Macy Méndez MD at [facility] ENDOSCOPY    COLONOSCOPY N/A 2023    COLONOSCOPY performed by Macy Méndez MD at [facility] ENDOSCOPY    COLONOSCOPY  2023    Méndez-divertics,normal anastamosis    GASTRIC BYPASS SURGERY  2000    HEMICOLECTOMY N/A 10/24/2022    LAPAROSCOPIC LEFT COLECTOMY performed by Ismael Hudson MD at [facility] OR    HIP SURGERY Left 10/23/2024    HIP HEMIARTHROPLASTY- LEFT performed by Cameron Wu MD at [facility] OR    HYSTERECTOMY (CERVIX STATUS UNKNOWN)  1978    PORT SURGERY Left 2022    INSERT POWER PORT performed by Ismael Hudson MD at [facility] OR    PORT SURGERY Left 2023    PORT REVISION performed by Ismael Hudson MD at [facility] OR    NYLA-EN-Y GASTRIC BYPASS N/A 10/24/2022    LAPAROSCOPIC REDUCTION OF INTUSSUSCEPTION WITH FIXATION performed by Maximilian Garcia DO at [facility] OR    TONSILLECTOMY      UPPER GASTROINTESTINAL ENDOSCOPY N/A 10/11/2022    EGD BIOPSY performed by Macy Méndez MD at [facility] ENDOSCOPY       Immunization History:   Immunization History   Administered Date(s) Administered    COVID-19, MODERNA BLUE border, Primary or Immunocompromised, (age 12y+),

## 2024-10-24 NOTE — CARE COORDINATION
Discharge Planning:  SW received voice mail from pt's daughter Jane asking if [facility] had denied Rehab stay   Jane's call back # [phone].  Called YASMIN Flores and left message to reach out to daughter.

## 2024-10-24 NOTE — CARE COORDINATION
[facility] - [employer] PRE-CERT REQUEST SUBMITTED VIA [place] PORTAL    REQUESTED FOR FACILITY: [facility]     ANTICIPATED ADMIT DATE TO [facility]: 10/24/2024       [facility] #: [id number]        Electronically signed by Lacey Bangura on 10/24/2024 at 2:08 PM   Case Management Assistant  Phone:  [phone] - Fax:  [phone]

## 2024-10-25 VITALS
BODY MASS INDEX: 24.19 KG/M2 | OXYGEN SATURATION: 96 % | SYSTOLIC BLOOD PRESSURE: 128 MMHG | WEIGHT: 159.61 LBS | RESPIRATION RATE: 16 BRPM | TEMPERATURE: 98.1 F | HEIGHT: 68 IN | DIASTOLIC BLOOD PRESSURE: 82 MMHG | HEART RATE: 104 BPM

## 2024-10-25 LAB
BASOPHILS # BLD: 0 K/UL (ref 0–0.2)
BASOPHILS NFR BLD: 0.4 %
DEPRECATED RDW RBC AUTO: 13.5 % (ref 12.4–15.4)
EOSINOPHIL # BLD: 0.1 K/UL (ref 0–0.6)
EOSINOPHIL NFR BLD: 1 %
HCT VFR BLD AUTO: 28.3 % (ref 36–48)
HGB BLD-MCNC: 9.5 G/DL (ref 12–16)
LYMPHOCYTES # BLD: 0.8 K/UL (ref 1–5.1)
LYMPHOCYTES NFR BLD: 15.8 %
MCH RBC QN AUTO: 32.2 PG (ref 26–34)
MCHC RBC AUTO-ENTMCNC: 33.6 G/DL (ref 31–36)
MCV RBC AUTO: 95.7 FL (ref 80–100)
MONOCYTES # BLD: 0.7 K/UL (ref 0–1.3)
MONOCYTES NFR BLD: 13.2 %
NEUTROPHILS # BLD: 3.7 K/UL (ref 1.7–7.7)
NEUTROPHILS NFR BLD: 69.6 %
PLATELET # BLD AUTO: 165 K/UL (ref 135–450)
PMV BLD AUTO: 6.5 FL (ref 5–10.5)
RBC # BLD AUTO: 2.96 M/UL (ref 4–5.2)
WBC # BLD AUTO: 5.4 K/UL (ref 4–11)

## 2024-10-25 PROCEDURE — 94760 N-INVAS EAR/PLS OXIMETRY 1: CPT

## 2024-10-25 PROCEDURE — 6370000000 HC RX 637 (ALT 250 FOR IP): Performed by: ORTHOPAEDIC SURGERY

## 2024-10-25 PROCEDURE — 97530 THERAPEUTIC ACTIVITIES: CPT

## 2024-10-25 PROCEDURE — 6360000002 HC RX W HCPCS: Performed by: ORTHOPAEDIC SURGERY

## 2024-10-25 PROCEDURE — 97116 GAIT TRAINING THERAPY: CPT

## 2024-10-25 PROCEDURE — 85025 COMPLETE CBC W/AUTO DIFF WBC: CPT

## 2024-10-25 PROCEDURE — 6370000000 HC RX 637 (ALT 250 FOR IP): Performed by: HOSPITALIST

## 2024-10-25 PROCEDURE — 2580000003 HC RX 258: Performed by: ORTHOPAEDIC SURGERY

## 2024-10-25 PROCEDURE — 94640 AIRWAY INHALATION TREATMENT: CPT

## 2024-10-25 RX ADMIN — MIDODRINE HYDROCHLORIDE 5 MG: 5 TABLET ORAL at 08:33

## 2024-10-25 RX ADMIN — SENNOSIDES AND DOCUSATE SODIUM 1 TABLET: 50; 8.6 TABLET ORAL at 08:34

## 2024-10-25 RX ADMIN — GABAPENTIN 300 MG: 300 CAPSULE ORAL at 08:34

## 2024-10-25 RX ADMIN — TIOTROPIUM BROMIDE INHALATION SPRAY 2 PUFF: 3.12 SPRAY, METERED RESPIRATORY (INHALATION) at 07:48

## 2024-10-25 RX ADMIN — ACETAMINOPHEN 325MG 650 MG: 325 TABLET ORAL at 05:10

## 2024-10-25 RX ADMIN — ESCITALOPRAM OXALATE 20 MG: 10 TABLET ORAL at 08:34

## 2024-10-25 RX ADMIN — CYANOCOBALAMIN TAB 1000 MCG 1000 MCG: 1000 TAB at 08:34

## 2024-10-25 RX ADMIN — DIGOXIN 125 MCG: 125 TABLET ORAL at 08:34

## 2024-10-25 RX ADMIN — ENOXAPARIN SODIUM 40 MG: 100 INJECTION SUBCUTANEOUS at 08:34

## 2024-10-25 RX ADMIN — SODIUM CHLORIDE, PRESERVATIVE FREE 10 ML: 5 INJECTION INTRAVENOUS at 08:35

## 2024-10-25 RX ADMIN — BUDESONIDE AND FORMOTEROL FUMARATE DIHYDRATE 2 PUFF: 80; 4.5 AEROSOL RESPIRATORY (INHALATION) at 07:48

## 2024-10-25 RX ADMIN — ACETAMINOPHEN 325MG 650 MG: 325 TABLET ORAL at 12:30

## 2024-10-25 ASSESSMENT — PAIN SCALES - GENERAL
PAINLEVEL_OUTOF10: 0
PAINLEVEL_OUTOF10: 0

## 2024-10-25 NOTE — CARE COORDINATION
Received call from FLORES Bills [facility] ([phone])  She gave skilled facility approval:     Reference Number: [id number]    Approved 10/25/24 - 10/31/24    With Update 10/31/2024  Fax Update to [place] [phone]      Spoke with Carlotta at [facility] regarding above.    Electronically signed by Shelby Joya, STACEY, LISW, Case Management on 10/25/2024 at 4:00 PM  [place] [phone]

## 2024-10-25 NOTE — CARE COORDINATION
DISCHARGE SUMMARY     DATE OF DISCHARGE: 10/25/2024    DISCHARGE DESTINATION: skilled unit    FACILITY:   Discharging to Facility/ Agency   Name: [facility]  Address:  [address] [place] [address]   Phone:  [phone]  Fax:  [phone]    INSURANCE PRECERT OBTAINED: yes    CEDRICK/PASAAR COMPLETED: yes    TRANSPORTATION:     Company Name:  [facility] [employer]      Time: 530pm    Phone Number: [phone]      COMMENTS: Informed patient's daughter Jane who will call patient and family of precert and transport time.   Informed Carlotta at [facility].  Rn aware. Report number [phone].    Electronically signed by STACEY Freeman, LISW, Case Management on 10/25/2024 at 4:26 PM  [place] [phone]

## 2024-10-25 NOTE — CARE COORDINATION
[facility] precert still pending.    Electronically signed by STACEY Hilliard, SANTY, Case Management 10/25/2024 at 8:30 AM  [phone]    11:16 AM  Spoke with Carlotta at [facility]--they also have not received precert. Spoke with daughter Jane to inform her that precert still pending.    Electronically signed by STACEY Freeman, SANTY, Case Management on 10/25/2024 at 11:23 AM  [place] [phone]

## 2024-10-25 NOTE — PLAN OF CARE
Problem: Chronic Conditions and Co-morbidities  Goal: Patient's chronic conditions and co-morbidity symptoms are monitored and maintained or improved  10/24/2024 0815 by Rae Muñoz RN  Outcome: Progressing  Flowsheets (Taken 10/23/2024 1600 by Antonette May, RN)  Care Plan - Patient's Chronic Conditions and Co-Morbidity Symptoms are Monitored and Maintained or Improved:   Monitor and assess patient's chronic conditions and comorbid symptoms for stability, deterioration, or improvement   Collaborate with multidisciplinary team to address chronic and comorbid conditions and prevent exacerbation or deterioration   Update acute care plan with appropriate goals if chronic or comorbid symptoms are exacerbated and prevent overall improvement and discharge     Problem: Discharge Planning  Goal: Discharge to home or other facility with appropriate resources  10/24/2024 0815 by Rae Muñoz RN  Outcome: Progressing  Flowsheets (Taken 10/23/2024 1600 by Antonette May, RN)  Discharge to home or other facility with appropriate resources:   Identify barriers to discharge with patient and caregiver   Arrange for needed discharge resources and transportation as appropriate   Identify discharge learning needs (meds, wound care, etc)   Arrange for interpreters to assist at discharge as needed   Refer to discharge planning if patient needs post-hospital services based on physician order or complex needs related to functional status, cognitive ability or social support system     Problem: Pain  Goal: Verbalizes/displays adequate comfort level or baseline comfort level  10/24/2024 0815 by Rae Muñoz RN  Outcome: Progressing  Flowsheets (Taken 10/23/2024 0002 by Jenny Melgar, ADONIS)  Verbalizes/displays adequate comfort level or baseline comfort level:   Encourage patient to monitor pain and request assistance   Administer analgesics based on type and severity of pain and evaluate response   Consider cultural and 
  Problem: Chronic Conditions and Co-morbidities  Goal: Patient's chronic conditions and co-morbidity symptoms are monitored and maintained or improved  10/24/2024 1928 by Bobbi Manzano, RN  Outcome: Progressing  Flowsheets (Taken 10/24/2024 1928)  Care Plan - Patient's Chronic Conditions and Co-Morbidity Symptoms are Monitored and Maintained or Improved:   Monitor and assess patient's chronic conditions and comorbid symptoms for stability, deterioration, or improvement   Collaborate with multidisciplinary team to address chronic and comorbid conditions and prevent exacerbation or deterioration  10/24/2024 0815 by Rae Muñoz RN  Outcome: Progressing  Flowsheets (Taken 10/23/2024 1600 by Antonette May, RN)  Care Plan - Patient's Chronic Conditions and Co-Morbidity Symptoms are Monitored and Maintained or Improved:   Monitor and assess patient's chronic conditions and comorbid symptoms for stability, deterioration, or improvement   Collaborate with multidisciplinary team to address chronic and comorbid conditions and prevent exacerbation or deterioration   Update acute care plan with appropriate goals if chronic or comorbid symptoms are exacerbated and prevent overall improvement and discharge     Problem: Discharge Planning  Goal: Discharge to home or other facility with appropriate resources  10/24/2024 1928 by Bobbi Manzano, RN  Outcome: Progressing  Flowsheets (Taken 10/24/2024 1928)  Discharge to home or other facility with appropriate resources:   Identify barriers to discharge with patient and caregiver   Identify discharge learning needs (meds, wound care, etc)   Arrange for needed discharge resources and transportation as appropriate  10/24/2024 0815 by Rae Muñoz RN  Outcome: Progressing  Flowsheets (Taken 10/23/2024 1600 by Antonette May, RN)  Discharge to home or other facility with appropriate resources:   Identify barriers to discharge with patient and caregiver   Arrange for 
  Problem: Chronic Conditions and Co-morbidities  Goal: Patient's chronic conditions and co-morbidity symptoms are monitored and maintained or improved  10/25/2024 0722 by Nai Shah RN  Outcome: Progressing     Problem: Discharge Planning  Goal: Discharge to home or other facility with appropriate resources  10/25/2024 0722 by Nai Shah RN  Outcome: Progressing   Assessed patients knowledge of discharge.  Will continue to work with patient on discharge planning and answer patient questions. Will consult case management and  as necessary.   Problem: Pain  Goal: Verbalizes/displays adequate comfort level or baseline comfort level  10/25/2024 0722 by Nai Shah RN  Outcome: Progressing   Patient educated on acute pain.  Taught patient to use call light to ask for pain medication.  PRN pain medication given for acute pain.  Will continue to monitor pain per unit protocol.    Problem: Skin/Tissue Integrity  Goal: Absence of new skin breakdown  Description: 1.  Monitor for areas of redness and/or skin breakdown  2.  Assess vascular access sites hourly  3.  Every 4-6 hours minimum:  Change oxygen saturation probe site  4.  Every 4-6 hours:  If on nasal continuous positive airway pressure, respiratory therapy assess nares and determine need for appliance change or resting period.  Outcome: Progressing   Will monitor skin and mucous members.  Will turn patient every 2 hours, monitor for friction and sheering, and change dressings as needed.  Will preform skin assessment every shift.     Problem: Safety - Adult  Goal: Free from fall injury  10/25/2024 0722 by Nai Shah RN  Outcome: Progressing   Will remain free from falls. Fall precautions are in place. Call light, telephone and bedside table are within reach.   Problem: ABCDS Injury Assessment  Goal: Absence of physical injury  10/25/2024 0722 by Nai Shah RN  Outcome: Progressing     
  Problem: Chronic Conditions and Co-morbidities  Goal: Patient's chronic conditions and co-morbidity symptoms are monitored and maintained or improved  Outcome: Progressing  Flowsheets (Taken 10/23/2024 0002)  Care Plan - Patient's Chronic Conditions and Co-Morbidity Symptoms are Monitored and Maintained or Improved:   Update acute care plan with appropriate goals if chronic or comorbid symptoms are exacerbated and prevent overall improvement and discharge   Collaborate with multidisciplinary team to address chronic and comorbid conditions and prevent exacerbation or deterioration   Monitor and assess patient's chronic conditions and comorbid symptoms for stability, deterioration, or improvement     Problem: Discharge Planning  Goal: Discharge to home or other facility with appropriate resources  Outcome: Progressing  Flowsheets (Taken 10/23/2024 0002)  Discharge to home or other facility with appropriate resources:   Identify barriers to discharge with patient and caregiver   Identify discharge learning needs (meds, wound care, etc)   Refer to discharge planning if patient needs post-hospital services based on physician order or complex needs related to functional status, cognitive ability or social support system   Arrange for needed discharge resources and transportation as appropriate     Problem: Pain  Goal: Verbalizes/displays adequate comfort level or baseline comfort level  Outcome: Progressing  Flowsheets (Taken 10/23/2024 0002)  Verbalizes/displays adequate comfort level or baseline comfort level:   Encourage patient to monitor pain and request assistance   Administer analgesics based on type and severity of pain and evaluate response   Consider cultural and social influences on pain and pain management   Implement non-pharmacological measures as appropriate and evaluate response   Assess pain using appropriate pain scale   Notify Licensed Independent Practitioner if interventions unsuccessful or patient 
Consider cultural and social influences on pain and pain management   Implement non-pharmacological measures as appropriate and evaluate response   Assess pain using appropriate pain scale   Notify Licensed Independent Practitioner if interventions unsuccessful or patient reports new pain  10/23/2024 1341 by Antonette May, RN  Outcome: Progressing     Problem: Skin/Tissue Integrity  Goal: Absence of new skin breakdown  Description: 1.  Monitor for areas of redness and/or skin breakdown  2.  Assess vascular access sites hourly  3.  Every 4-6 hours minimum:  Change oxygen saturation probe site  4.  Every 4-6 hours:  If on nasal continuous positive airway pressure, respiratory therapy assess nares and determine need for appliance change or resting period.  Outcome: Progressing     Problem: Safety - Adult  Goal: Free from fall injury  10/23/2024 1957 by Nicole Koehler RN  Outcome: Progressing  Flowsheets (Taken 10/23/2024 0003 by Jenny Melgar RN)  Free From Fall Injury: Instruct family/caregiver on patient safety  10/23/2024 1341 by Antonette May RN  Outcome: Progressing  Flowsheets (Taken 10/23/2024 0003 by Jenny Melgar, RN)  Free From Fall Injury: Instruct family/caregiver on patient safety     Problem: ABCDS Injury Assessment  Goal: Absence of physical injury  Outcome: Progressing  Flowsheets (Taken 10/23/2024 0003 by Jenny Melgar RN)  Absence of Physical Injury: Implement safety measures based on patient assessment

## 2024-10-25 NOTE — PROGRESS NOTES
Physical Therapy    Facility/Department: [address] ORTHOPEDICS    Physical Therapy Initial Assessment      Name: Cindy Pérez    : 1945    MRN: [id number]    Date of Service: 10/23/2024    Assessment / Discharge Recommendations:    -left hip fracture >>> evangelista-arthroplasty  wbat   posterior precautions   -good start mobilizing from bed via kory stedy   -anticipate able to get up to walker at next session and initiate ambulation   -will need continued PTOT and nursing care in a skilled setting   -she is able to tolerate a HIGH frequency of sessions    Cindy Pérez scored a  on the AM-PAC short mobility form.   Current research shows that an AM-PAC score of 17 or less is typically not associated with a discharge to the patient's home setting.   Based on the patient's AM-PAC score and their current functional mobility deficits, it is recommended that the patient have 5-7 sessions per week of Physical Therapy at d/c to increase the patient's independence.         Patient Diagnosis(es): The primary encounter diagnosis was Closed fracture of left hip, initial encounter ([facility]). A diagnosis of Pulmonary nodule was also pertinent to this visit.  Past Medical History:  has a past medical history of A-fib ([facility]), Age-related osteoporosis without current pathological fracture, Anxiety, Cancer ([facility]), Cancer of descending colon (HCC), Cancer of descending colon (HCC), CHF (congestive heart failure) ([facility]), Colon polyp, Colon, diverticulosis, Colon, diverticulosis, Depression, Gastrointestinal hemorrhage, History of blood transfusion, Hypoglycemia, Intussusception of jejunum (HCC), Low blood pressure, Personal history of colon cancer, stage III, S/P gastric bypass, Vitamin B12 deficiency, Wears dentures, and Wears glasses.  Past Surgical History:  has a past surgical history that includes Gastric bypass surgery (2000); Hysterectomy (1978); Cholecystectomy (2001); Colonoscopy (2012); 
  Physical Therapy    Facility/Department: [address] ORTHOPEDICS    Physical Therapy Treatment note       Name: Cindy Pérez    : 1945    MRN: [id number]    Date of Service: 10/24/2024    Assessment / Discharge Recommendations:    -left hip fracture >>>hemiarthroplasty  wbat   posterior precautions    -able to mobilize up to walker to ambulate a short distance in room     -will need continued PT OT and nursing care in a skilled setting     -lives alone and will need to reach Modified Independent level for all mobility and basic ADLs    -able to tolerate a high frequency of sessions      Cindy Pérez scored a  on the AM-PAC short mobility form.     Current research shows that an AM-PAC score of 17 or less is typically not associated with a discharge to the patient's home setting.           Patient Diagnosis(es): The primary encounter diagnosis was Closed fracture of left hip, initial encounter ([facility]). A diagnosis of Pulmonary nodule was also pertinent to this visit.  Past Medical History:  has a past medical history of A-fib ([facility]), Age-related osteoporosis without current pathological fracture, Anxiety, Cancer ([facility]), Cancer of descending colon (HCC), Cancer of descending colon (HCC), CHF (congestive heart failure) ([facility]), Colon polyp, Colon, diverticulosis, Colon, diverticulosis, Depression, Gastrointestinal hemorrhage, History of blood transfusion, Hypoglycemia, Intussusception of jejunum (HCC), Low blood pressure, Personal history of colon cancer, stage III, S/P gastric bypass, Vitamin B12 deficiency, Wears dentures, and Wears glasses.  Past Surgical History:  has a past surgical history that includes Gastric bypass surgery (2000); Hysterectomy (1978); Cholecystectomy (2001); Colonoscopy (2012); Appendectomy; Tonsillectomy; cervical fusion (N/A, 2019); Upper gastrointestinal endoscopy (N/A, 10/11/2022); Colonoscopy (N/A, 10/11/2022); Colonoscopy (N/A, 10/11/2022); 
Bedside introduction complete. Patient denies any needs at this time. Updated whiteboard with RN and PCA name and number. Patient able to make needs known, using call light appropriately. Will continue to monitor and assess for needs and comfort.     
Bedside introduction complete. Patient denies any needs at this time. Updated whiteboard with RN and PCA name and number. Patient able to make needs known, using call light appropriately. Will continue to monitor and assess for needs and comfort.     
[facility] Orthopedic Surgery   Progress Note      S/P :  SUBJECTIVE  in bed. States was up in chair and to BR.Alert and oriented. . Pain is   described in left hip and with the intensity of moderate. Pain is described as aching.       OBJECTIVE              Physical                      VITALS:  /70   Pulse 84   Temp 98.2 °F (36.8 °C) (Oral)   Resp 16   Ht 1.727 m (5' 8\")   Wt 73.1 kg (161 lb 2.5 oz)   SpO2 95%   BMI 24.50 kg/m²                     MUSCULOSKELETAL:  left foot NVI. Wiggles toes to command. Able to plantarflex and dorsiflex ankle Pedal pulses are palpable.                    NEUROLOGIC:                                  Sensory:  Touch:  Left Lower Extremity:  normal                                                 Surgical wound appears clean and dry left hip with Prineo dressing Ice pack on.    Data       CBC:   Lab Results   Component Value Date/Time    WBC 7.2 10/24/2024 05:40 AM    RBC 3.32 10/24/2024 05:40 AM    HGB 10.8 10/24/2024 05:40 AM    HCT 31.9 10/24/2024 05:40 AM    MCV 96.0 10/24/2024 05:40 AM    MCH 32.4 10/24/2024 05:40 AM    MCHC 33.8 10/24/2024 05:40 AM    RDW 13.3 10/24/2024 05:40 AM     10/24/2024 05:40 AM    MPV 6.4 10/24/2024 05:40 AM        WBC:    Lab Results   Component Value Date/Time    WBC 7.2 10/24/2024 05:40 AM        Hemoglobin/Hematocrit:    Lab Results   Component Value Date/Time    HGB 10.8 10/24/2024 05:40 AM    HCT 31.9 10/24/2024 05:40 AM        PT/INR:    Lab Results   Component Value Date/Time    PROTIME 11.5 11/07/2019 10:15 AM    INR 1.01 11/07/2019 10:15 AM              Current Inpatient Medications             Current Facility-Administered Medications: enoxaparin (LOVENOX) injection 40 mg, 40 mg, SubCUTAneous, Daily  0.9 % sodium chloride infusion, , IntraVENous, Continuous  sodium chloride flush 0.9 % injection 5-40 mL, 5-40 mL, IntraVENous, 2 times per day  sodium chloride flush 0.9 % injection 5-40 mL, 5-40 mL, IntraVENous, PRN  0.9 % 
Data- discharge order received, pt verbalized agreement to discharge, disposition to previous residence, no needs for HHC/DME.     Action- discharge instructions prepared and given to transport, pt verbalized understanding. Medication information packet given r/t NEW and/or CHANGED prescriptions emphasizing name/purpose/side effects, pt verbalized understanding. Discharge instruction summary: Diet- regular, Activity- stedy x1, Primary Care Physician as follows: Rafal Gauthier -[phone] f/u appointment, immunizations reviewed and  prescription medications filled patient's preferred pharmacy. Inpatient surgical procedure precautions reviewed: Patient is leaving for discharge to [facility]. This RN called report and spoke with Jessica. No further questions at this time.     Response- Pt belongings gathered, IV removed without complications. Dry dressing applied. Disposition is home (no HHC/DME needs), transported with transport, taken to lobby via w/c w/ belongings and discharge packet,  no complications. Electronically signed by Nai Shah RN on 10/25/2024 at 5:39 PM      
Medication Reconciliation    List of medications patient is currently taking is complete.     Source of information: 1. Conversation with patient and family at bedside                                      2. EPIC records     Notes regarding home medications:   1. Patient did not take any of her home medications prior to arrival to the ER today.    Luca Bhardwaj [facility], PharmD, BCPS  10/22/2024 3:31 PM                
Occupational Therapy  Facility/Department: [address] ORTHOPEDICS  Occupational Therapy Daily Treatment    Name: Cindy Pérez  : 1945  MRN: [id number]  Date of Service: 10/25/2024    Discharge Recommendations:  Patient would benefit from continued therapy after discharge, 3-5 sessions per week, 5-7 sessions per week  OT Equipment Recommendations  Other: defer to D/C facility     Cindy Pérez scored a  on the AM-[facility] ADL Inpatient form. Please see assessment section for further patient specific details.    If patient discharges prior to next session this note will serve as a discharge summary.  Please see below for the latest assessment towards goals.      Patient Diagnosis(es): The primary encounter diagnosis was Closed fracture of left hip, initial encounter (HCC). A diagnosis of Pulmonary nodule was also pertinent to this visit.  Past Medical History:  has a past medical history of A-fib (HCC), Age-related osteoporosis without current pathological fracture, Anxiety, Cancer (HCC), Cancer of descending colon (HCC), Cancer of descending colon (HCC), CHF (congestive heart failure) (HCC), Colon polyp, Colon, diverticulosis, Colon, diverticulosis, Depression, Gastrointestinal hemorrhage, History of blood transfusion, Hypoglycemia, Intussusception of jejunum (HCC), Low blood pressure, Personal history of colon cancer, stage III, S/P gastric bypass, Vitamin B12 deficiency, Wears dentures, and Wears glasses.  Past Surgical History:  has a past surgical history that includes Gastric bypass surgery (2000); Hysterectomy (1978); Cholecystectomy (2001); Colonoscopy (2012); Appendectomy; Tonsillectomy; cervical fusion (N/A, 2019); Upper gastrointestinal endoscopy (N/A, 10/11/2022); Colonoscopy (N/A, 10/11/2022); Colonoscopy (N/A, 10/11/2022); hemicolectomy (N/A, 10/24/2022); Garrett-en-Y Gastric Bypass (N/A, 10/24/2022); colectomy (10/2022); Port Surgery (Left, 2022); Port 
Occupational Therapy  Facility/Department: [address] ORTHOPEDICS  Occupational Therapy Initial Assessment    Name: Cindy Pérez  : 1945  MRN: [id number]  Date of Service: 10/23/2024    Discharge Recommendations:  Patient would benefit from continued therapy after discharge, 3-5 sessions per week  OT Equipment Recommendations  Other: defer to D/C facility     Cindy Pérez scored a 16/24 on the AM-PAC ADL Inpatient form. Current research shows that an AM-PAC score of 17 or less is typically not associated with a discharge to the patient's home setting. Based on the patient's AM-PAC score and their current ADL deficits, it is recommended that the patient have 3-5 sessions per week of Occupational Therapy at d/c to increase the patient's independence.  Please see assessment section for further patient specific details.    If patient discharges prior to next session this note will serve as a discharge summary.  Please see below for the latest assessment towards goals.      Patient Diagnosis(es): The primary encounter diagnosis was Closed fracture of left hip, initial encounter ([facility]). A diagnosis of Pulmonary nodule was also pertinent to this visit.  Past Medical History:  has a past medical history of A-fib ([facility]), Age-related osteoporosis without current pathological fracture, Anxiety, Cancer ([facility]), Cancer of descending colon (HCC), Cancer of descending colon (HCC), CHF (congestive heart failure) ([facility]), Colon polyp, Colon, diverticulosis, Colon, diverticulosis, Depression, Gastrointestinal hemorrhage, History of blood transfusion, Hypoglycemia, Intussusception of jejunum (HCC), Low blood pressure, Personal history of colon cancer, stage III, S/P gastric bypass, Vitamin B12 deficiency, Wears dentures, and Wears glasses.  Past Surgical History:  has a past surgical history that includes Gastric bypass surgery (2000); Hysterectomy (1978); Cholecystectomy (2001); Colonoscopy (2012); 
Order provided for UA from LYNDA Hodges. Sample to be collected w/ patient's next void.   
PO contrast started  Electronically signed by Rae Muñoz RN on 10/24/2024 at 2:47 PM    
Patient awake and resting in bed. Assessment complete. Alert and oriented x4. IV infusing. Dressing clean dry and intact. Tolerated PM meds well. Patient denies needs or concerns. Call light within reach. Able to make needs known. Fall precautions in place.   
Patient is A&Ox4. Patient is resting in chair, awake and quiet. Room air. Side rails are up x2. Fall precautions are in  place. Chair alarm on and feet elevated while in chair. Call light, telephone and bedside table are within reach. VSS taken. AM meds given. Shift assessment completed. Prineo dressing is clean, dry and intact to left hip. Electronically signed by aNi Shah RN on 10/25/2024 at 12:05 PM      Bedside report given to ADONIS Genao. No further questions at this time. Electronically signed by Nai Shah RN on 10/25/2024 at 12:07 PM    
Patient is alert & oriented x4, resting in bed, weaned to room air. Gets up x1 assist with stedy. L chest port normal saline locked, flushed easily with brisk blood return. 2/4 bed rails up, bed in lowest position, fall precautions in place, bed alarm on, call light within reach. Morning medications given as ordered, tolerated well. Patient states pain is minimal, denies need for pain medication. Patient denies further needs at this time. L hip dressing down to prineo, remains CDI. Ice applied to L hip. SCD and JAUN hose in place. Will cont to monitor and reassess.  Electronically signed by Rae Muñoz RN on 10/24/2024 at 10:22 AM    
Patient is alert and oriented x 4 in bed . Vital, assessment and daily care given. Education and care plan updated. Medication given as per order. Fall precaution initiated, call light within reach, bed in low position and wheels locked. Patient is in bedrest, room air. Addressed current patient's need. Continue to monitor.       Electronically signed by Jenny Melgar RN on 10/23/2024 at 12:40 AM    
Patient is leaving for discharge to [facility]. This RN called report and spoke with Jessica. No further questions at this time. Electronically signed by Nai Shah RN on 10/25/2024 at 4:39 PM    
Patient returned to unit and was placed back into room 3118. Patient assisted back to bed x1 w/ a stedy, denies further needs. SCDs applied bilaterally. Heels elevated on pillows. Call light within reach. Fall precautions in place. Will continue to monitor and assess.   
Patient to CT w/ hospital transport via stretcher. Last 1/4 of PO contrast sent w/ patient per administration directions   
Patient up to the restroom x1 w/ a steady w/ this RN. Patient reports new onset urinary urgency & frequency + burning with urinating. Message sent to LYNDA Hodges w/ request to send UA. Waiting for response   
Pre-op checklist completed,CHG bath given, consent done,complete bed changed. Update given to pre-op RN. Addressed current patient's need. Continue to monitor.    Electronically signed by Jenny Melgar RN on 10/23/2024 at 6:21 AM    
Pt is up in chair at this time, noon medications given. VSS. Report received by this RN. RN introduced self to patient and updated white board. No additional needs a this time. Call light is within reach and fall precautions in place.    Electronically signed by Birgit Aldrich RN on 10/25/2024 at 12:44 PM    
Pt received into room 3118 per bed from PACU . She has oxygen on at 2 liters per nasal cannula and her left hip dressing is clean, dry, and intact. She denies pain and nausea and just wants to sleep at this time.   
Pt resting comfortably in bed with eyes closed, opens eyes spontaneously to voice, states pain still  tolerable at 3/10 at this time, ready to move back to inpatient room. Vss. No signs of distress noted at this time.  
Pt taken upstairs via transport, no signs of distress noted at time of transfer back to inpatient room. On 2L via NC at time of transfer to room.  
Pt to pacu from OR. Pt awake at arrival, groggy, oriented to pacu, denies pain or nausea at this time, pt on 2L via Nc per crna. placed on monitor, vss. Dressing to left hip clean dry and intact, ice applied, abductor pillow in place. Pt updated on plan of care. No signs of distress noted at this time, glucose 125. Xray called. Report obtained. Pt has small skin tear to left side of lower lip, per OR team pt bit self upon extubation, mild swelling, no active bleeding noted at this time.  
Pt. Received into room 3118 from ED per stretcher policies and procedures reviewed with pt. And family. Pt. Has left chest port that is accessed and capped . She has a donovan that is draining clear yellow urine pt. Is in supine position on the hover mat pad.   
Report called to nickolas brown. No signs of distress noted at this time.  
The patients OARRS report has been reviewed online and any prescribing of pain related medications is based on our findings.The patient has been issued narcotics to safely reduce postoperative pain and promote tolerance with physical therapies and ADL's. Reduction in dosing will be addressed with the next narcotic refill request. Dosing is adjusted for patients with history of chronic pain disorders.    
To OR per bed.   
Villegas removed.   
Xray at bedside.  
2011 -- -- -- 83 16 94 % --   10/23/24 1919 123/73 98.6 °F (37 °C) Oral 84 17 94 % --   10/23/24 1627 (!) 147/83 97.6 °F (36.4 °C) Oral 82 17 92 % --   10/23/24 1457 -- -- -- 87 16 93 % --       24HR INTAKE/OUTPUT:    Intake/Output Summary (Last 24 hours) at 10/24/2024 1421  Last data filed at 10/24/2024 1216  Gross per 24 hour   Intake 1574 ml   Output 950 ml   Net 624 ml       CONSTITUTIONAL: awake, alert, cooperative, no apparent distress   EYES: pupils equal, round and reactive to light, sclera clear and conjunctiva normal  ENT: Normocephalic, without obvious abnormality, atraumatic  NECK: supple, symmetrical, no jugular venous distension and no carotid bruits   HEMATOLOGIC/LYMPHATIC: no cervical, supraclavicular or axillary lymphadenopathy   LUNGS: no increased work of breathing and clear to auscultation   CARDIOVASCULAR: regular rate and rhythm, normal S1 and S2, no murmur noted  ABDOMEN: normal bowel sounds x 4, soft, non-distended, non-tender, no masses palpated, no hepatosplenomgaly   MUSCULOSKELETAL: full range of motion noted, tone is normal  NEUROLOGIC: awake, alert, oriented to name, place and time. Motor skills grossly intact.   SKIN: Normal skin color, texture, turgor and no jaundice. appears intact   EXTREMITIES: no LE edema Left lateral hip surgical incision clean dry and intact no surrounding erythema or edema no discharge or drainage.    DATA:  CBC:    Recent Labs     10/24/24  0540 10/23/24  0412 10/22/24  1244 09/26/24  1055   WBC 7.2 5.7 3.9* 3.2*   NEUTROABS 5.2 4.4 2.8 1.6*   LYMPHOPCT 13.9 10.2 16.2 36.0   RBC 3.32* 4.16 4.03 4.27   HGB 10.8* 13.3 12.9 13.3   HCT 31.9* 39.6 38.9 41.3   MCV 96.0 95.3 96.4 96.5   MCH 32.4 32.0 31.9 31.2   MCHC 33.8 33.6 33.2 32.3   RDW 13.3 13.2 13.6 13.3    201 198 235       PT/INR:  No results for input(s): \"INR\" in the last 720 hours.    Invalid input(s): \"PROT\"  PTT:  No results for input(s): \"APTT\" in the last 720 hours.    CMP:    Recent Labs     
Surgery (Left, 01/12/2023); Colonoscopy (N/A, 09/22/2023); Colonoscopy (09/22/2023); and hip surgery (Left, 10/23/2024).           Assessment  Performance deficits / Impairments: Decreased endurance;Decreased functional mobility ;Decreased ADL status;Decreased balance;Decreased strength;Decreased high-level IADLs  Assessment: Pt is an 78 yo F admitted with L displaced femoral neck fx after a fall at home, now s/p L hip hemiarthroplasty per Dr. Wu 10-23-24. WBAT, posterior hip precautions. CT during this admission revealed left upper lobe lung nodule, oncology consulted. PMH including: metastatic colon cancer, CHF, depression, anxiety, GERD. PTA - pt lives at home alone, 1 MARQUITA, typically IND ADL/IADLs and mobility/txs without AD. Today - pt tolerated tx session well, completing bed mobility min A with inc time, functional txs to/from household surfaces min A with cues, amb with RW household distance in room (~10') min A with cues, demo very slow gait, no LOB. Chair pulled for safety d/t fatigue/LE \"shakiness\" and after brief rest break, pt amb with RW chair > recliner (~3') min A with cues. Dep LB dressing. Pt declining additional ADLs at this time. Pt ed on: isadora hose wearing schedule, posterior hip precautions, safe tx technique and walker safety, POC. Pt verbalized understanding. Cont skilled therapy services while hospitalized to address above deficits. Recommend cont therapy upon D/C at mod-high intensity to maximize pt safety and independence prior to return home as pt lives alone and remains a high fall risk with low AMPAC score.  Prognosis: Good  Exam: see above  REQUIRES OT FOLLOW-UP: Yes  Activity Tolerance  Activity Tolerance: Patient Tolerated treatment well  Activity Tolerance Comments: RA. VSS throughout.     Plan  Occupational Therapy Plan  Times Per Week: 3-5x  Times Per Day: Once a day  Current Treatment Recommendations: Strengthening, Balance training, Functional mobility training, Safety 
injection 5-40 mL, 5-40 mL, IntraVENous, PRN  0.9 % sodium chloride infusion, , IntraVENous, PRN  acetaminophen (TYLENOL) tablet 650 mg, 650 mg, Oral, 4 times per day  oxyCODONE (ROXICODONE) immediate release tablet 5 mg, 5 mg, Oral, Q4H PRN **OR** oxyCODONE HCl (OXY-IR) immediate release tablet 10 mg, 10 mg, Oral, Q4H PRN  HYDROmorphone (DILAUDID) injection 0.25 mg, 0.25 mg, IntraVENous, Q3H PRN **OR** HYDROmorphone (DILAUDID) injection 0.5 mg, 0.5 mg, IntraVENous, Q3H PRN  sennosides-docusate sodium (SENOKOT-S) 8.6-50 MG tablet 1 tablet, 1 tablet, Oral, BID  hydrOXYzine HCl (ATARAX) tablet 10 mg, 10 mg, Oral, Q8H PRN  vitamin B-12 (CYANOCOBALAMIN) tablet 1,000 mcg, 1,000 mcg, Oral, Daily  digoxin (LANOXIN) tablet 125 mcg, 125 mcg, Oral, Daily  escitalopram (LEXAPRO) tablet 20 mg, 20 mg, Oral, Daily  budesonide-formoterol (SYMBICORT) 80-4.5 MCG/ACT inhaler 2 puff, 2 puff, Inhalation, BID RT **AND** tiotropium (SPIRIVA RESPIMAT) 2.5 MCG/ACT inhaler 2 puff, 2 puff, Inhalation, Daily RT  gabapentin (NEURONTIN) capsule 300 mg, 300 mg, Oral, BID  midodrine (PROAMATINE) tablet 5 mg, 5 mg, Oral, BID WC  potassium chloride (KLOR-CON M) extended release tablet 40 mEq, 40 mEq, Oral, PRN **OR** potassium bicarb-citric acid (EFFER-K) effervescent tablet 40 mEq, 40 mEq, Oral, PRN  magnesium sulfate 2000 mg in 50 mL IVPB premix, 2,000 mg, IntraVENous, PRN  ondansetron (ZOFRAN-ODT) disintegrating tablet 4 mg, 4 mg, Oral, Q8H PRN **OR** ondansetron (ZOFRAN) injection 4 mg, 4 mg, IntraVENous, Q6H PRN  polyethylene glycol (GLYCOLAX) packet 17 g, 17 g, Oral, Daily PRN  acetaminophen (TYLENOL) tablet 650 mg, 650 mg, Oral, Q6H PRN **OR** acetaminophen (TYLENOL) suppository 650 mg, 650 mg, Rectal, Q6H PRN    ASSESSMENT AND PLAN    Fall  Left hip pain  Left femoral neck fx, post hemiarthroplasty per DR Wu. Stable exam  DVT prophylaxis ordered, ASA 81mg twice at day for 30 days for DVT prophylaxis   PT OT for ADL's and ambulation as 
Multi-level, Able to Live on Main level with bedroom/bathroom, Laundry in basement (two level home, roommate on 2nd floor. pt lives on main level, laundry in basement.)  Home Access: Stairs to enter with rails  Entrance Stairs - Number of Steps: 1  Bathroom Shower/Tub: Tub/Shower unit  Bathroom Toilet: Standard  Bathroom Equipment: Shower chair  Home Equipment:  (unsure if RW or standard?)  Has the patient had two or more falls in the past year or any fall with injury in the past year?: Yes  ADL Assistance: Independent  Homemaking Assistance: Independent  Ambulation Assistance: Independent (no device)  Transfer Assistance: Independent  Active : Yes  Vision/Hearing  Vision  Vision: Impaired  Vision Exceptions: Wears glasses at all times  Hearing  Hearing: Within functional limits    Cognition   Orientation  Overall Orientation Status: Within Functional Limits  Cognition  Overall Cognitive Status: WFL    Objective    Bed mobility  Supine to Sit: Minimal assistance;Contact guard assistance  Sit to Supine: Unable to assess  Scooting: Stand by assistance;Contact guard assistance  Transfers  Sit to Stand: Contact guard assistance;Minimal Assistance  Stand to Sit: Contact guard assistance  Ambulation  Surface: Level tile  Device: Rolling Walker  Assistance: Contact guard assistance  Quality of Gait: step to pattern leading with left LE     -very short step length -able to increase to more effective with verbal cues and some assist with advancing the walker   -good heel contact and good weight bearing  Distance: ~20 feet  More Ambulation?: No  Stairs/Curb  Stairs?: No     Balance  Comments: midline in sitting at the EOB and in static stance on the walker with min assist for safety    -dynamic is fair while using rolling walker at min assist for safety     -cold pack to left hip  -reinforced practice with the spirometer and to do seated ankle pump    AM-PAC Basic Mobility - Inpatient   How much help is needed turning 
For convenience and continuity at follow-up the following most recent labs are provided:      CBC:    Lab Results   Component Value Date/Time    WBC 5.4 10/25/2024 05:18 AM    HGB 9.5 10/25/2024 05:18 AM    HCT 28.3 10/25/2024 05:18 AM     10/25/2024 05:18 AM       Renal:    Lab Results   Component Value Date/Time     10/23/2024 04:12 AM    K 4.1 10/23/2024 04:12 AM    K 4.5 02/01/2024 02:08 PM    CL 99 10/23/2024 04:12 AM    CO2 27 10/23/2024 04:12 AM    BUN 13 10/23/2024 04:12 AM    CREATININE 0.6 10/23/2024 04:12 AM    CALCIUM 8.8 10/23/2024 04:12 AM    PHOS 3.2 10/26/2022 06:56 AM         Significant Diagnostic Studies    Radiology:   CT CHEST ABDOMEN PELVIS W CONTRAST Additional Contrast? Radiologist Recommendation   Final Result   Chest      New punctate nodule left upper lobe and right lower lobe, possibly early   pulmonary metastasis.  There is a background of pulmonary emphysema.      Wall thickening right upper lobe airways, with patchy ground-glass opacity,   likely postinflammatory-infectious      Abdomen and pelvis      No obvious change in suspected hepatic metastasis.         XR HIP 2-3 VW W PELVIS LEFT   Final Result   Status post left hip arthroplasty as described above.         XR CHEST 1 VIEW   Final Result   No acute process.      Emphysema.         XR HIP LEFT (2-3 VIEWS)   Final Result   Displaced left femoral neck fracture.         XR FOOT LEFT (MIN 3 VIEWS)   Final Result   No acute osseous abnormality.         CT HEAD WO CONTRAST   Final Result   No acute intracranial abnormality.         CT CERVICAL SPINE WO CONTRAST   Final Result   Postfusion change and degenerative change.  No acute osseous abnormality      Left upper lobe pulmonary nodule.  This is new compared to prior.  This could   be metastatic given the reported history of colon carcinoma                Consults:     IP CONSULT TO ONCOLOGY  IP CONSULT TO SOCIAL WORK  IP CONSULT TO PHYSICAL MEDICINE 
transcription errors may be present.

## 2024-10-29 ENCOUNTER — TELEPHONE (OUTPATIENT)
Dept: ORTHOPEDIC SURGERY | Age: 79
End: 2024-10-29

## 2024-10-29 NOTE — TELEPHONE ENCOUNTER
Daughter Jane is listed on HIPAA authorization dated 12/22/21.   S/P Left hip hemiarthroplasty; DOS 10/23/24.   2 week post op appt needs to be scheduled.     LVM for Mia to call back.

## 2024-10-29 NOTE — TELEPHONE ENCOUNTER
General Question     Subject: LT HIP POST OP CARE   Patient and /or Facility Request: Cindy Pérez   Contact Number: [phone]     PATIENT DAUGHTER STEPHANI CALLED IN TO SEE IF SHE CAN SPEAK TO SOMEONE ABOUT THE PATIENT LT HIP POST OP CARE... PATIENT IS CURRENTLY IN [facility] IN [facility]...    REQ A CALL BACK..    PLEASE ADVISE

## 2024-11-06 ENCOUNTER — OFFICE VISIT (OUTPATIENT)
Dept: ORTHOPEDIC SURGERY | Age: 79
End: 2024-11-06

## 2024-11-06 VITALS — HEIGHT: 68 IN | WEIGHT: 159 LBS | BODY MASS INDEX: 24.1 KG/M2

## 2024-11-06 DIAGNOSIS — Z96.649 S/P HIP HEMIARTHROPLASTY: Primary | ICD-10-CM

## 2024-11-06 PROCEDURE — 99024 POSTOP FOLLOW-UP VISIT: CPT | Performed by: ORTHOPAEDIC SURGERY

## 2024-11-06 NOTE — PROGRESS NOTES
Cindy Pérez  [id number]  November 6, 2024    Chief Complaint   Patient presents with    Post-Op Check     DOS 10/23/24 L hip evangelista       History: The patient is an 79-year-old female who is here in follow-up regarding her left hip.  She underwent a left hip hemiarthroplasty on 10/23.  She is recovering at [facility].  She reports no pain in the hip.  She is progressing rather well.  She is ambulating with a walker.    The patient's  past medical history, medications, allergies,  family history, social history, and have been reviewed, and dated and are recorded in the chart.  Pertinent items are noted in HPI.  Review of systems reviewed from Pertinent History Form dated on 11/6 and available in the patient's chart under the Media tab.     Vitals:  Ht 1.727 m (5' 8\")   Wt 72.1 kg (159 lb)   BMI 24.18 kg/m²     Physical: On examination, the patient is alert and oriented x 3.  The patient is neurovascularly intact in both lower extremities.  She has mild left lower extremity swelling.  She has no pain with rotation of her left hip.  Her incision is clean and dry.  There is no evidence of drainage.  There is no evidence of erythema.  Leg lengths are symmetric.  There is no evidence of DVT.    X-rays: AP pelvis and 2 views of the left hip obtained in the office today were extensively reviewed.  The prosthesis is well aligned.  There is no evidence of loosening or subsidence.    Impression: Status post left hip hemiarthroplasty    Plan: At this time, the patient will continue with posterior hip precautions.  We again reviewed posterior hip precautions.  She will continue to use an elevated commode.  The patient plans on returning home this Friday.  We instructed her to get rid of all throw rugs on the floor.  She will gradually transition to a cane.  She will work on her balance and gait.  The patient can follow-up with me in approximately 4 weeks and we will reassess her then.    Orders Placed

## 2024-11-11 ENCOUNTER — CARE COORDINATION (OUTPATIENT)
Dept: CASE MANAGEMENT | Age: 79
End: 2024-11-11

## 2024-11-11 DIAGNOSIS — S72.002A LEFT DISPLACED FEMORAL NECK FRACTURE (HCC): Primary | ICD-10-CM

## 2024-11-11 PROCEDURE — 1111F DSCHRG MED/CURRENT MED MERGE: CPT | Performed by: FAMILY MEDICINE

## 2024-11-11 NOTE — CARE COORDINATION
Care Transitions Note    Initial Call - Call within 2 business days of discharge: Yes    Patient Current Location:  Home: [address] [place] [address] [address]  [facility] [address]    Post Acute Care Manager contacted the patient by telephone to perform post hospital discharge assessment, verified name and  as identifiers. Provided introduction to self, and explanation of the Post Acute Care Manager role.     Patient: Cindy Pérez    Patient : 1945   MRN: [id number]    Reason for Admission: Closed fracture of left hip, initial encounter   Discharge Date: 10/25/24  RURS: Readmission Risk Score: 13.6      Last Discharge Facility       Date Complaint Diagnosis Description Type Department Provider    10/22/24 Fall Closed fracture of left hip, initial encounter ([facility]) ... ED to Hosp-Admission (Discharged) (ADMITTED) [facility] MillsW Silvia Díaz MD            Was this an external facility discharge? Yes. Discharge Date: . Facility Name:   [facility] to [place] with [facility]      Additional needs identified to be addressed with provider   No needs identified             Method of communication with provider: none.    Patients top risk factors for readmission: medical condition-L hip sx from femur fx     Interventions to address risk factors:   Education: posterior precautions and CHF zone     Care Summary Note: spoke with Cindy Pérez after 2 IDs. Educated on last ortho visit. Pt is using a walker and educated on posterior precautions  To avoid bending, use a pillow between her legs. Avoid low seating including toilet, keep feet forward. Avoid twisting. On  has a HFU. Granddaughter is staying with her for now. COA is coming out. C will be out tomorrow. She is eating and sleeping well. Reviewed medications and she is on midodrine. Educated on need for to monitor b/p and to take daily weights. CHF zone. Agreeable to calls. Left my contact information.     Post Acute Care Manager reviewed red flags with 
denies loose teeth or dentures

## 2024-11-12 ENCOUNTER — CARE COORDINATION (OUTPATIENT)
Dept: CARE COORDINATION | Age: 79
End: 2024-11-12

## 2024-11-12 ASSESSMENT — PATIENT HEALTH QUESTIONNAIRE - PHQ9
SUM OF ALL RESPONSES TO PHQ QUESTIONS 1-9: 0
SUM OF ALL RESPONSES TO PHQ9 QUESTIONS 1 & 2: 0
SUM OF ALL RESPONSES TO PHQ QUESTIONS 1-9: 0
1. LITTLE INTEREST OR PLEASURE IN DOING THINGS: NOT AT ALL
SUM OF ALL RESPONSES TO PHQ QUESTIONS 1-9: 0
SUM OF ALL RESPONSES TO PHQ QUESTIONS 1-9: 0
2. FEELING DOWN, DEPRESSED OR HOPELESS: NOT AT ALL

## 2024-11-12 NOTE — CARE COORDINATION
Ambulatory Care Management Note    Date/Time:  11/12/2024 4:28 PM    This patient was received as a referral from Care Transition Nurse.  Ambulatory Care Manager outreached to patient today to offer care management services.   Phone rings until auto-disconnects.  No option to leave vm.      
[facility] to assist with completion.  I advised patient to watch for information on Advance Care Planning, which AC will arrange to have mailed via US mail in the coming week.     Joyce Craft RN  11/12/2024

## 2024-11-12 NOTE — CARE COORDINATION
Per [facility]- Per this CTN handoff -  MIGHT either of you have availability to call SNF ([facility], to inquire name of HHC who accepted referral for this pt?       Call to [facility] was transferred to [facility] which was full. Called back and spoke again with Wendy/reception who said she was able to speak to medical records and patient was referred to   [facility] [phone] for HHC.    Routed to [facility]

## 2024-11-13 ENCOUNTER — OFFICE VISIT (OUTPATIENT)
Dept: FAMILY MEDICINE CLINIC | Age: 79
End: 2024-11-13

## 2024-11-13 VITALS
SYSTOLIC BLOOD PRESSURE: 100 MMHG | HEART RATE: 87 BPM | DIASTOLIC BLOOD PRESSURE: 60 MMHG | BODY MASS INDEX: 23.1 KG/M2 | WEIGHT: 152.4 LBS | OXYGEN SATURATION: 95 % | HEIGHT: 68 IN

## 2024-11-13 DIAGNOSIS — R91.8 PULMONARY NODULES: ICD-10-CM

## 2024-11-13 DIAGNOSIS — M81.0 OSTEOPOROSIS, UNSPECIFIED OSTEOPOROSIS TYPE, UNSPECIFIED PATHOLOGICAL FRACTURE PRESENCE: ICD-10-CM

## 2024-11-13 DIAGNOSIS — R06.02 SOB (SHORTNESS OF BREATH): ICD-10-CM

## 2024-11-13 DIAGNOSIS — D64.9 POSTOPERATIVE ANEMIA: ICD-10-CM

## 2024-11-13 DIAGNOSIS — R06.02 SOB (SHORTNESS OF BREATH): Primary | ICD-10-CM

## 2024-11-13 DIAGNOSIS — J43.2 CENTRILOBULAR EMPHYSEMA (HCC): ICD-10-CM

## 2024-11-13 DIAGNOSIS — Z96.642 S/P HIP REPLACEMENT, LEFT: ICD-10-CM

## 2024-11-13 DIAGNOSIS — Z09 HOSPITAL DISCHARGE FOLLOW-UP: ICD-10-CM

## 2024-11-13 LAB
ALBUMIN SERPL-MCNC: 3.8 G/DL (ref 3.4–5)
ALBUMIN/GLOB SERPL: 1.9 {RATIO} (ref 1.1–2.2)
ALP SERPL-CCNC: 127 U/L (ref 40–129)
ALT SERPL-CCNC: 15 U/L (ref 10–40)
ANION GAP SERPL CALCULATED.3IONS-SCNC: 10 MMOL/L (ref 3–16)
AST SERPL-CCNC: 27 U/L (ref 15–37)
BASOPHILS # BLD: 0 K/UL (ref 0–0.2)
BASOPHILS NFR BLD: 0.7 %
BILIRUB SERPL-MCNC: 0.3 MG/DL (ref 0–1)
BUN SERPL-MCNC: 14 MG/DL (ref 7–20)
CALCIUM SERPL-MCNC: 9.2 MG/DL (ref 8.3–10.6)
CHLORIDE SERPL-SCNC: 103 MMOL/L (ref 99–110)
CO2 SERPL-SCNC: 24 MMOL/L (ref 21–32)
CREAT SERPL-MCNC: 0.7 MG/DL (ref 0.6–1.2)
DEPRECATED RDW RBC AUTO: 14.4 % (ref 12.4–15.4)
EOSINOPHIL # BLD: 0.1 K/UL (ref 0–0.6)
EOSINOPHIL NFR BLD: 1.9 %
GFR SERPLBLD CREATININE-BSD FMLA CKD-EPI: 87 ML/MIN/{1.73_M2}
GLUCOSE SERPL-MCNC: 90 MG/DL (ref 70–99)
HCT VFR BLD AUTO: 34.1 % (ref 36–48)
HGB BLD-MCNC: 11.2 G/DL (ref 12–16)
LYMPHOCYTES # BLD: 0.8 K/UL (ref 1–5.1)
LYMPHOCYTES NFR BLD: 15.3 %
MCH RBC QN AUTO: 31.3 PG (ref 26–34)
MCHC RBC AUTO-ENTMCNC: 32.9 G/DL (ref 31–36)
MCV RBC AUTO: 95.1 FL (ref 80–100)
MONOCYTES # BLD: 0.4 K/UL (ref 0–1.3)
MONOCYTES NFR BLD: 8.1 %
NEUTROPHILS # BLD: 4 K/UL (ref 1.7–7.7)
NEUTROPHILS NFR BLD: 74 %
NT-PROBNP SERPL-MCNC: 118 PG/ML (ref 0–449)
PLATELET # BLD AUTO: 382 K/UL (ref 135–450)
PMV BLD AUTO: 6.6 FL (ref 5–10.5)
POTASSIUM SERPL-SCNC: 4.8 MMOL/L (ref 3.5–5.1)
PROT SERPL-MCNC: 5.8 G/DL (ref 6.4–8.2)
RBC # BLD AUTO: 3.59 M/UL (ref 4–5.2)
SODIUM SERPL-SCNC: 137 MMOL/L (ref 136–145)
WBC # BLD AUTO: 5.5 K/UL (ref 4–11)

## 2024-11-13 ASSESSMENT — ENCOUNTER SYMPTOMS
FACIAL SWELLING: 0
CONSTIPATION: 0
CHEST TIGHTNESS: 0
VOMITING: 0
BACK PAIN: 0
COLOR CHANGE: 0
PHOTOPHOBIA: 0
EYE PAIN: 0
EYE ITCHING: 0
ALLERGIC/IMMUNOLOGIC NEGATIVE: 1
SORE THROAT: 0
ANAL BLEEDING: 0
RECTAL PAIN: 0
DIARRHEA: 0
STRIDOR: 0
VOICE CHANGE: 0
SINUS PRESSURE: 0
APNEA: 0
EYE REDNESS: 0
BLOOD IN STOOL: 0
RHINORRHEA: 0
CHOKING: 0
COUGH: 0
WHEEZING: 0
NAUSEA: 0
SHORTNESS OF BREATH: 1
ABDOMINAL PAIN: 0
TROUBLE SWALLOWING: 0
ABDOMINAL DISTENTION: 0
EYE DISCHARGE: 0

## 2024-11-13 NOTE — ASSESSMENT & PLAN NOTE
New, not at goal (unstable), changes made today: CT shows emphysema but is also likely anemic-also on 600 BID of neurontin(? CHF)-BNP checked

## 2024-11-13 NOTE — ASSESSMENT & PLAN NOTE
Chronic, not at goal (unstable), continue current treatment plan-labs--referred to Dr Mackay--seen on chest CT but no PE

## 2024-11-13 NOTE — PROGRESS NOTES
Post-Discharge Transitional Care Follow Up      Cindy Pérez   YOB: 1945    Date of Office Visit:  11/13/2024  Date of Hospital Admission: 10/22/24  Date of Hospital Discharge: 10/25/24  Readmission Risk Score (high >=14%. Medium >=10%):Readmission Risk Score: 13.6      Care management risk score Rising risk (score 2-5) and Complex Care (Scores >=6): No Risk Score On File     Non face to face  following discharge, date last encounter closed (first attempt may have been earlier): 11/12/2024     Call initiated 2 business days of discharge: Yes     SOB (shortness of breath)  Assessment & Plan:   New, not at goal (unstable), changes made today: CT shows emphysema but is also likely anemic-also on 600 BID of neurontin(? CHF)-BNP checked  Orders:  -     Comprehensive Metabolic Panel; Future  -     CBC with Auto Differential; Future  -     Brain Natriuretic Peptide; Future  Centrilobular emphysema (HCC)  Assessment & Plan:   Chronic, not at goal (unstable), continue current treatment plan-labs--referred to Dr Mackay--seen on chest CT but no PE  Osteoporosis, unspecified osteoporosis type, unspecified pathological fracture presence  Assessment & Plan:   Chronic, not at goal (unstable), changes made today: will need tx once above is worked out  Postoperative anemia  Assessment & Plan:   New, not at goal (unstable), changes made today: recheck  S/P hip replacement, left  Assessment & Plan:   New, at goal (stable), changes made today: con't home PT  Pulmonary nodules  Assessment & Plan:   New, uncertain prognosis, changes made today: new chest CT soon-      Medical Decision Making: high complexity  No follow-ups on file.    On this date 11/13/2024 I have spent 20 minutes reviewing previous notes, test results and face to face with the patient discussing the diagnosis and importance of compliance with the treatment plan as well as documenting on the day of the visit.       Subjective:   HPI  Admitted with

## 2024-11-19 ENCOUNTER — CARE COORDINATION (OUTPATIENT)
Dept: CARE COORDINATION | Age: 79
End: 2024-11-19

## 2024-11-19 NOTE — CARE COORDINATION
Ambulatory Care Coordination Note     11/19/2024 3:46 PM     Patient outreach attempt by this ACM today to perform care management follow up . ACM was unable to reach the patient by telephone today;   Pt primary number rings until prompt invites \"enter remote access code\", no option to leave vm.     ACM: Joyce Craft RN     Care Summary Note: per review w/pt on initial ACC outreach 11.12.24, ACM attempted after 2pm (NOTE pt preference no calls before 2pm) and pt denied need/desire for higher frequency ACC outreach.  Nevertheless, this being week #2 in ACC, ACM aimed for outreach ahead of Thanksgiving holiday week.    Note pt has bronchoscopy scheduled tomorrow.  Will aim to reattempt for ACC outreach again by end of this week.     PCP/Specialist follow up:   Future Appointments         Provider Specialty Dept Phone    11/20/2024  8:00 AM SCHEDULE, [facility] PFT Pulmonary Function Testing [phone]    12/4/2024 9:30 AM Cameron Wu MD Orthopedic Surgery [phone]    12/18/2024 11:00 AM (Arrive by 9:45 AM) [facility] [phone]    12/23/2024 2:00 PM David Mackay MD Pulmonology [phone]            Follow Up:   Plan for next AC outreach in approximately  2-3 days  to complete:  - CC Protocol assessments  - disease specific assessments  - SDOH assessments  - goal progression  - RPM  - follow-up appointment with PCP .

## 2024-11-20 ENCOUNTER — HOSPITAL ENCOUNTER (OUTPATIENT)
Dept: PULMONOLOGY | Age: 79
Discharge: HOME | End: 2024-11-20
Attending: INTERNAL MEDICINE
Payer: MEDICARE

## 2024-11-20 ENCOUNTER — CARE COORDINATION (OUTPATIENT)
Dept: CARE COORDINATION | Age: 79
End: 2024-11-20

## 2024-11-20 DIAGNOSIS — R06.09 DOE (DYSPNEA ON EXERTION): ICD-10-CM

## 2024-11-20 LAB
DLCO %PRED: 57 %
DLCO PRED: NORMAL
DLCO/VA %PRED: NORMAL
DLCO/VA PRED: NORMAL
DLCO/VA: NORMAL
DLCO: NORMAL
EXPIRATORY TIME-POST: NORMAL
EXPIRATORY TIME: NORMAL
FEF 25-75 %CHNG: NORMAL
FEF 25-75 POST %PRED: NORMAL
FEF 25-75% %PRED-PRE: NORMAL
FEF 25-75% PRED: NORMAL
FEF 25-75-POST: NORMAL
FEF 25-75-PRE: NORMAL
FEV1 %PRED-POST: NORMAL
FEV1 %PRED-PRE: 112 %
FEV1 PRED: NORMAL
FEV1-POST: NORMAL
FEV1-PRE: NORMAL
FEV1/FVC %PRED-POST: NORMAL
FEV1/FVC %PRED-PRE: NORMAL
FEV1/FVC PRED: NORMAL
FEV1/FVC-POST: NORMAL
FEV1/FVC-PRE: 69 %
FVC %PRED-POST: NORMAL
FVC %PRED-PRE: NORMAL
FVC PRED: NORMAL
FVC-POST: NORMAL
FVC-PRE: NORMAL
GAW %PRED: NORMAL
GAW PRED: NORMAL
GAW: NORMAL
IC PRE %PRED: NORMAL
IC PRED: NORMAL
IC: NORMAL
MEP: NORMAL
MIP: NORMAL
MVV %PRED-PRE: NORMAL
MVV PRED: NORMAL
MVV-PRE: NORMAL
PEF %PRED-POST: NORMAL
PEF %PRED-PRE: NORMAL
PEF PRED: NORMAL
PEF%CHNG: NORMAL
PEF-POST: NORMAL
PEF-PRE: NORMAL
RAW %PRED: NORMAL
RAW PRED: NORMAL
RAW: NORMAL
RV PRE %PRED: NORMAL
RV PRED: NORMAL
RV: NORMAL
SVC %PRED: NORMAL
SVC PRED: NORMAL
SVC: NORMAL
TLC PRE %PRED: 147 %
TLC PRED: NORMAL
TLC: NORMAL
VA %PRED: NORMAL
VA PRED: NORMAL
VA: NORMAL
VTG %PRED: NORMAL
VTG PRED: NORMAL
VTG: NORMAL

## 2024-11-20 PROCEDURE — 94726 PLETHYSMOGRAPHY LUNG VOLUMES: CPT

## 2024-11-20 PROCEDURE — 94010 BREATHING CAPACITY TEST: CPT

## 2024-11-20 PROCEDURE — 94760 N-INVAS EAR/PLS OXIMETRY 1: CPT

## 2024-11-20 PROCEDURE — 94729 DIFFUSING CAPACITY: CPT

## 2024-11-20 ASSESSMENT — PULMONARY FUNCTION TESTS
FEV1_PERCENT_PREDICTED_PRE: 112
FEV1/FVC_PRE: 69

## 2024-11-20 NOTE — CARE COORDINATION
concepts at home & in community at all times, use of DME as appropriate  Confidence: 10/10  Anticipated Goal Completion Date: 3.11.25               PCP/Specialist follow up:   Future Appointments         Provider Specialty Dept Phone    12/4/2024 9:30 AM Cameron Wu MD Orthopedic Surgery [phone]    12/18/2024 11:00 AM (Arrive by 9:45 AM) [place] [address] Radiology [phone]    12/23/2024 2:00 PM David Mackay MD Pulmonology [phone]            Follow Up:   Plan for next ACM outreach in approximately 2 weeks to complete:  - disease specific assessments  - goal progression  - follow up appointment with PCP .   Patient  is agreeable to this plan.

## 2024-11-21 ENCOUNTER — ENROLLMENT (OUTPATIENT)
Dept: PHARMACY | Facility: CLINIC | Age: 79
End: 2024-11-21

## 2024-11-21 NOTE — PROCEDURES
Pulmonary Function Testing      Patient name:  Cindy Pérez      Unit #:   [id number]   Date of test: 11/20/2024  Date of interpretation:   11/21/2024    Ms. Cindy Pérez is a 79 y.o. year-old.  The spirometry data were acceptable and reproducible.     Spirometry:  Flow volume loops were obstructed. The FEV-1/FVC ratio was decreased. The  best FEV-1 was 2.44 liters (112% of predicted), which was normal. The FVC was 3.53 liters (121% of predicted), which was normal. Response to inhaled bronchodilators (albuterol) was not performed.    Lung volumes:  Lung volumes were tested by plethysmography. The total lung capacity was 8.11 liters (147% of predicted), which was increased. The residual volume was 5.04 liters (196% of predicted), which was increased. The ratio of residual volume to total lung capacity (RV/TLC) was 128%, which was increased.     Diffusion capacity was found to be (57% of predicted) which was decreased.       Interpretation:  Mild obstructive airway disease with evidence of hyperinflation and reduced diffusion capacity.      Ronny Rios MD, [facility] Pulmonary, Critical Care and Sleep Medicine  [address] , [facility] [address], [place] [address]

## 2024-11-25 ENCOUNTER — TELEPHONE (OUTPATIENT)
Dept: FAMILY MEDICINE CLINIC | Age: 79
End: 2024-11-25

## 2024-11-25 NOTE — TELEPHONE ENCOUNTER
Pt had a couple Pulmonary Function tests last week and haunted to discuss with  once he had reviewed them and resulted.

## 2024-12-02 ENCOUNTER — TELEPHONE (OUTPATIENT)
Dept: FAMILY MEDICINE CLINIC | Age: 79
End: 2024-12-02

## 2024-12-02 RX ORDER — FLUTICASONE FUROATE, UMECLIDINIUM BROMIDE AND VILANTEROL TRIFENATATE 200; 62.5; 25 UG/1; UG/1; UG/1
1 POWDER RESPIRATORY (INHALATION) DAILY
Qty: 1 EACH | Refills: 2 | Status: SHIPPED | OUTPATIENT
Start: 2024-12-02

## 2024-12-02 RX ORDER — ALBUTEROL SULFATE 90 UG/1
2 INHALANT RESPIRATORY (INHALATION) 4 TIMES DAILY PRN
Qty: 18 G | Refills: 0 | Status: SHIPPED | OUTPATIENT
Start: 2024-12-02

## 2024-12-02 NOTE — TELEPHONE ENCOUNTER
Pt wanted to ask if  would be able to prescribe anything to help with pt's breathing issues before she is able to see  at the end of this month.

## 2024-12-04 ENCOUNTER — OFFICE VISIT (OUTPATIENT)
Dept: ORTHOPEDIC SURGERY | Age: 79
End: 2024-12-04

## 2024-12-04 VITALS — WEIGHT: 152 LBS | HEIGHT: 68 IN | BODY MASS INDEX: 23.04 KG/M2

## 2024-12-04 DIAGNOSIS — Z96.649 S/P HIP HEMIARTHROPLASTY: Primary | ICD-10-CM

## 2024-12-04 PROCEDURE — 99024 POSTOP FOLLOW-UP VISIT: CPT | Performed by: ORTHOPAEDIC SURGERY

## 2024-12-04 NOTE — PROGRESS NOTES
Cindy Pérez  [id number]  December 4, 2024    Chief Complaint   Patient presents with    Post-Op Check      Left hip hemiarthroplasty; DOS 10/23/24.       History: The patient is an 79-year-old female who is here in follow-up regarding her left hip.  She underwent a left hip hemiarthroplasty on 10/23.  She did well.  She reports no pain in the left hip.  He has returned home.  She is ambulating with a cane.      The patient's  past medical history, medications, allergies,  family history, social history, and have been reviewed, and dated and are recorded in the chart.  Pertinent items are noted in HPI.  Review of systems reviewed from Pertinent History Form dated on 11/6 and available in the patient's chart under the Media tab.     Vitals:  Ht 1.727 m (5' 8\")   Wt 68.9 kg (152 lb)   BMI 23.11 kg/m²     Physical: On examination, the patient is alert and oriented x 3.  The patient is neurovascularly intact in both lower extremities.  She has no left lower extremity swelling.  She has no pain with rotation of her left hip.  Her incision is completely healed. There is no evidence of drainage.  There is no evidence of erythema.  Leg lengths are symmetric.  There is no evidence of DVT.    X-rays: AP pelvis and 2 views of the left hip obtained in the office today were extensively reviewed.  The prosthesis is well aligned.  There is no evidence of loosening or subsidence.    Impression: Status post left hip hemiarthroplasty    Plan: At this time, the patient will continue to work on her balance, gait and general strengthening.  She may gradually discontinue the cane as she sees fit.  The patient can follow-up with me in 2 months and we will reassess her then.  We will then likely plan on releasing the patient.  At follow-up, final AP pelvis and 2 views of the left hip will be obtained.      Orders Placed This Encounter   Procedures    XR HIP 2-3 VW W PELVIS LEFT     Rm 22     Standing Status:   Future     Number of

## 2024-12-05 ENCOUNTER — CARE COORDINATION (OUTPATIENT)
Dept: CARE COORDINATION | Age: 79
End: 2024-12-05

## 2024-12-05 NOTE — CARE COORDINATION
Ambulatory Care Coordination Note     2024 2:19 PM     Patient Current Location:  Home: [address]  [facility] [address]     ACM contacted the patient by telephone. Verified name and  with patient as identifiers.         ACM: Joyce Craft RN     Challenges to be reviewed by the provider   Additional needs identified to be addressed with provider No  none               Method of communication with provider: none.    Utilization: Patient has not had any utilization since our last call.     Care Summary Note: pt reports she graduated home PT, told her PT to stop coming d/t 'didn't need PT anymore'.  Pt shares she is able to negotiate steps, get up/out of chairs, in/out of shower/tub, independently - did not feel she had any more progress to be realized. Endorses belief she is returned to optimal baseline and reportedly PT shares same assessment.   Pt happily reports 'doing really well' overall. Denies any newly presenting concerns or needs.  Agrees to self outreach direct to ACM, as needed, between routine ACC outreaches. Pt endorses ongoing ACC outreach frequency q2-4 weeks through remainder of ACC program.     Offered patient enrollment in the Remote Patient Monitoring (RPM) program for in-home monitoring: Yes, but did not enroll at this time: already monitoring with home equipment.     Assessments Completed:   Congestive Heart Failure Assessment    Are you currently restricting fluids?: No Restriction  Do you understand a low sodium diet?: Yes  Do you understand how to read food labels?: Yes  How many restaurant meals do you eat per week?: 0  Do you salt your food before tasting it?: No     No patient-reported symptoms      Symptoms:  None: Yes      Symptom course: stable  Weight trend: stable  Salt intake watch compared to last visit: stable        ,   General Assessment    Do you have any symptoms that are causing concern?: No          Medications Reviewed:   Patient denies any changes with

## 2024-12-19 ENCOUNTER — CARE COORDINATION (OUTPATIENT)
Dept: CARE COORDINATION | Age: 79
End: 2024-12-19

## 2024-12-19 NOTE — CARE COORDINATION
Ambulatory Care Coordination Note     12/19/2024 2:10 PM     Patient outreach attempt by this ACM today to perform care management follow up . ACM was unable to reach the patient by telephone today;   left voice message requesting a return phone call to this ACM.     ACM: Joyce Craft RN     Care Summary Note: routine ACC outreach, as planned per pt preference - ACC outreach monthly (prefers calls after 2pm)  Had to leave .  Offered ACM callback. Reviewed need to schedule future/return visit w/PCP.  Advised ACM sought review ahead of fast approaching holidays. Reminded ACM available through end of business 12.24.24, off Caitlin Day & New Year's Day, and taking PTO 12.26.24-12.31.24, returning 1.2.25 after sign off 12.24.24  Provided & repeated ACM callback number.      PCP/Specialist follow up:   Future Appointments         Provider Specialty Dept Phone    12/20/2024 12:20 PM (Arrive by 11:05 AM) [place] [address] Radiology [phone]    12/23/2024 2:00 PM David Mackay MD Pulmonology [phone]    2/4/2025 9:30 AM Cameron Wu MD Orthopedic Surgery [phone]            Follow Up:   Plan for next ACM outreach in approximately 3 weeks to complete:  - disease specific assessments  - goal progression  - follow-up appointment with PCP .

## 2024-12-20 ENCOUNTER — HOSPITAL ENCOUNTER (OUTPATIENT)
Dept: CT IMAGING | Age: 79
Discharge: HOME | End: 2024-12-20
Attending: INTERNAL MEDICINE
Payer: MEDICARE

## 2024-12-20 DIAGNOSIS — C18.6 MALIGNANT NEOPLASM OF DESCENDING COLON (HCC): ICD-10-CM

## 2024-12-20 PROCEDURE — 6360000004 HC RX CONTRAST MEDICATION: Performed by: INTERNAL MEDICINE

## 2024-12-20 PROCEDURE — 71260 CT THORAX DX C+: CPT

## 2024-12-20 RX ORDER — IOPAMIDOL 612 MG/ML
50 INJECTION, SOLUTION INTRAVASCULAR
Status: COMPLETED | OUTPATIENT
Start: 2024-12-20 | End: 2024-12-20

## 2024-12-20 RX ORDER — IOPAMIDOL 755 MG/ML
75 INJECTION, SOLUTION INTRAVASCULAR
Status: COMPLETED | OUTPATIENT
Start: 2024-12-20 | End: 2024-12-20

## 2024-12-20 RX ADMIN — IOPAMIDOL 50 ML: 612 INJECTION, SOLUTION INTRAVENOUS at 11:16

## 2024-12-20 RX ADMIN — IOPAMIDOL 75 ML: 755 INJECTION, SOLUTION INTRAVENOUS at 11:16

## 2024-12-23 ENCOUNTER — OFFICE VISIT (OUTPATIENT)
Dept: PULMONOLOGY | Age: 79
End: 2024-12-23
Payer: MEDICARE

## 2024-12-23 ENCOUNTER — HOSPITAL ENCOUNTER (OUTPATIENT)
Dept: GENERAL RADIOLOGY | Age: 79
Discharge: HOME | End: 2024-12-23
Payer: MEDICARE

## 2024-12-23 VITALS
HEART RATE: 99 BPM | OXYGEN SATURATION: 97 % | DIASTOLIC BLOOD PRESSURE: 60 MMHG | SYSTOLIC BLOOD PRESSURE: 110 MMHG | BODY MASS INDEX: 23.23 KG/M2 | WEIGHT: 148 LBS | RESPIRATION RATE: 16 BRPM | HEIGHT: 67 IN

## 2024-12-23 DIAGNOSIS — R06.09 DOE (DYSPNEA ON EXERTION): Primary | ICD-10-CM

## 2024-12-23 DIAGNOSIS — J44.9 COPD, MILD (HCC): ICD-10-CM

## 2024-12-23 DIAGNOSIS — R06.02 SHORTNESS OF BREATH: ICD-10-CM

## 2024-12-23 DIAGNOSIS — C18.9 COLON CANCER METASTASIZED TO LUNG (HCC): ICD-10-CM

## 2024-12-23 DIAGNOSIS — C78.00 COLON CANCER METASTASIZED TO LUNG (HCC): ICD-10-CM

## 2024-12-23 PROCEDURE — 1159F MED LIST DOCD IN RCRD: CPT | Performed by: INTERNAL MEDICINE

## 2024-12-23 PROCEDURE — 71046 X-RAY EXAM CHEST 2 VIEWS: CPT

## 2024-12-23 PROCEDURE — 1123F ACP DISCUSS/DSCN MKR DOCD: CPT | Performed by: INTERNAL MEDICINE

## 2024-12-23 PROCEDURE — 99204 OFFICE O/P NEW MOD 45 MIN: CPT | Performed by: INTERNAL MEDICINE

## 2024-12-23 NOTE — PROGRESS NOTES
volume to total lung capacity (RV/TLC) was 128%, which was increased.      Diffusion capacity was found to be (57% of predicted) which was decreased.        Interpretation:  Mild obstructive airway disease with evidence of hyperinflation and reduced diffusion capacity.       Cardiology    ECHO    February 17, 2022  Findings      Left Ventricle   Normal left ventricular size.   Severe septal hypertrophy.   Mild posterior wall hypertrophy.   Mildly decreased left ventricular systolic function with an estimated   ejection fraction of 45%.   Diffuse hypokinesis.   Grade I diastolic dysfunction with normal LV filling pressures.      Mitral Valve   Mitral annular calcification is present.   No evidence of mitral regurgitation.   No evidence of mitral stenosis.      Left Atrium   The left atrial size is normal.      Aortic Valve   Mildly calcified aortic valve.   There is no significant aortic valve regurgitation or stenosis.      Aorta   The aortic root appears normal.      Right Ventricle   The right ventricle is normal in size and function.   Tapse: 1.91 cm.   RV s: 13.3 cm/s      Tricuspid Valve   The tricuspid valve is normal in structure.   Trivial tricuspid regurgitation.   No evidence of tricuspid stenosis.      Right Atrium   The right atrial size is normal.      Pulmonic Valve   The pulmonic valve is not well visualized.   Trivial pulmonic regurgitation present.   No evidence of pulmonic valve stenosis.      Pericardial Effusion   No pericardial effusion noted.      Pleural Effusion   No pleural effusion.      Miscellaneous   IVC size is normal (<2.1cm) and collapses > 50% with respiration consistent   with normal RA pressure (3mmHg).   Estimated pulmonary artery systolic pressure is at 20 mmHg assuming a right   atrial pressure of 3 mmHg.     Stress Test  None recent    Assessment:      Diagnosis Orders   1. CRAVEN (dyspnea on exertion)        2. COPD, mild (HCC)        3. Colon cancer metastasized to lung (HCC)

## 2024-12-25 RX ORDER — ALBUTEROL SULFATE 90 UG/1
INHALANT RESPIRATORY (INHALATION)
Qty: 6.7 G | Refills: 3 | Status: SHIPPED | OUTPATIENT
Start: 2024-12-25

## 2025-01-02 ENCOUNTER — HOSPITAL ENCOUNTER (OUTPATIENT)
Dept: NUCLEAR MEDICINE | Age: 80
Discharge: HOME | End: 2025-01-02
Attending: INTERNAL MEDICINE
Payer: MEDICARE

## 2025-01-02 ENCOUNTER — CARE COORDINATION (OUTPATIENT)
Dept: CARE COORDINATION | Age: 80
End: 2025-01-02

## 2025-01-02 DIAGNOSIS — J44.9 COPD, MILD (HCC): ICD-10-CM

## 2025-01-02 DIAGNOSIS — R06.09 DOE (DYSPNEA ON EXERTION): ICD-10-CM

## 2025-01-02 PROCEDURE — A9558 XE133 XENON 10MCI: HCPCS | Performed by: INTERNAL MEDICINE

## 2025-01-02 PROCEDURE — A9540 TC99M MAA: HCPCS | Performed by: INTERNAL MEDICINE

## 2025-01-02 PROCEDURE — 78582 LUNG VENTILAT&PERFUS IMAGING: CPT

## 2025-01-02 PROCEDURE — 3430000000 HC RX DIAGNOSTIC RADIOPHARMACEUTICAL: Performed by: INTERNAL MEDICINE

## 2025-01-02 RX ORDER — XENON XE-133 10 MCI/1
7.25 GAS RESPIRATORY (INHALATION)
Status: COMPLETED | OUTPATIENT
Start: 2025-01-02 | End: 2025-01-02

## 2025-01-02 RX ADMIN — Medication 5.26 MILLICURIE: at 08:59

## 2025-01-02 RX ADMIN — XENON XE-133 7.25 MILLICURIE: 10 GAS RESPIRATORY (INHALATION) at 08:59

## 2025-01-02 NOTE — CARE COORDINATION
Ambulatory Care Coordination Note     1/2/2025 4:28 PM     Patient outreach attempt by this ACM today to perform care management follow up . ACM was unable to reach the patient by telephone today;   left voice message requesting a return phone call to this ACM.     ACM: Joyce Craft RN     Care Summary Note: routine ACC outreach; left vm requesting return callback.  Provided & repeated ACM callback number    PCP/Specialist follow up:   Future Appointments         Provider Specialty Dept Phone    2/4/2025 9:30 AM Cameron Wu MD Orthopedic Surgery [phone]            Follow Up:   Plan for next ACM outreach in approximately 1 week to complete:  - disease specific assessments  - goal progression  - follow-up appointment with PCP .

## 2025-01-03 ENCOUNTER — TELEPHONE (OUTPATIENT)
Dept: PULMONOLOGY | Age: 80
End: 2025-01-03

## 2025-01-03 NOTE — TELEPHONE ENCOUNTER
Patient had NM Lung Vent test completed 1/2/2025. Please review and advised when you would like to see pt to discuss results.   Thank you

## 2025-01-08 ENCOUNTER — CARE COORDINATION (OUTPATIENT)
Dept: CARE COORDINATION | Age: 80
End: 2025-01-08

## 2025-01-08 NOTE — CARE COORDINATION
Ambulatory Care Coordination Note     2025 3:31 PM     Patient Current Location:  Home: [address] [address]     ACM contacted the patient by telephone. Verified name and  with patient as identifiers.         ACM: Joyce Craft RN     Challenges to be reviewed by the provider   Additional needs identified to be addressed with provider No  none               Method of communication with provider: none.    Utilization: Patient has not had any utilization since our last call.     Care Summary Note: routine ACC outreach; pt/family deny any newly presenting concerns or identified needs - endorse pt has all Rx ahead of inclement weather forecast end of this week.  Reviewed availability for support & options for care in outreach as appropriate, if needed.  Pt/family accurately recite this writer's return callback number, in case of any newly presenting needs ahead of next planned ACC outreach - ACC outreach established at q2-4 weeks    Offered patient enrollment in the Remote Patient Monitoring (RPM) program for in-home monitoring: Yes, but did not enroll at this time: already monitoring with home equipment.     Assessments Completed:   Congestive Heart Failure Assessment    Are you currently restricting fluids?: No Restriction  Do you understand a low sodium diet?: Yes  Do you understand how to read food labels?: Yes  How many restaurant meals do you eat per week?: 0  Do you salt your food before tasting it?: No     No patient-reported symptoms      Symptoms:  None: Yes      Symptom course: stable  Weight trend: stable  Salt intake watch compared to last visit: stable      ,   General Assessment    Do you have any symptoms that are causing concern?: No          Medications Reviewed:   Patient denies any changes with medications and reports taking all medications as prescribed.    Advance Care Planning:   Reviewed during previous call   Not reviewed during this call     Care Planning:

## 2025-01-14 ENCOUNTER — HOSPITAL ENCOUNTER (OUTPATIENT)
Age: 80
Discharge: HOME | End: 2025-01-16
Attending: INTERNAL MEDICINE
Payer: MEDICARE

## 2025-01-14 DIAGNOSIS — J44.9 COPD, MILD (HCC): ICD-10-CM

## 2025-01-14 DIAGNOSIS — R06.09 DOE (DYSPNEA ON EXERTION): ICD-10-CM

## 2025-01-14 LAB
ECHO AO ASC DIAM: 3.2 CM
ECHO AO ROOT DIAM: 3.2 CM
ECHO AV AREA PEAK VELOCITY: 2.8 CM2
ECHO AV AREA VTI: 2.8 CM2
ECHO AV MEAN GRADIENT: 2 MMHG
ECHO AV MEAN VELOCITY: 0.7 M/S
ECHO AV PEAK GRADIENT: 5 MMHG
ECHO AV PEAK VELOCITY: 1.1 M/S
ECHO AV VELOCITY RATIO: 0.73
ECHO AV VTI: 20.8 CM
ECHO IVC PROX: 1.4 CM
ECHO LA AREA 2C: 14.8 CM2
ECHO LA AREA 4C: 14.2 CM2
ECHO LA DIAMETER: 3.9 CM
ECHO LA MAJOR AXIS: 4.3 CM
ECHO LA MINOR AXIS: 4.5 CM
ECHO LA TO AORTIC ROOT RATIO: 1.22
ECHO LA VOL BP: 38 ML (ref 22–52)
ECHO LA VOL MOD A2C: 38 ML (ref 22–52)
ECHO LA VOL MOD A4C: 37 ML (ref 22–52)
ECHO LV E' LATERAL VELOCITY: 8.27 CM/S
ECHO LV E' SEPTAL VELOCITY: 5.55 CM/S
ECHO LV EDV A2C: 68 ML
ECHO LV EDV A4C: 73 ML
ECHO LV EJECTION FRACTION A2C: 57 %
ECHO LV EJECTION FRACTION A4C: 56 %
ECHO LV EJECTION FRACTION BIPLANE: 57 % (ref 55–100)
ECHO LV ESV A2C: 29 ML
ECHO LV ESV A4C: 32 ML
ECHO LV FRACTIONAL SHORTENING: 36 % (ref 28–44)
ECHO LV INTERNAL DIMENSION DIASTOLIC: 5.5 CM (ref 3.9–5.3)
ECHO LV INTERNAL DIMENSION SYSTOLIC: 3.5 CM
ECHO LV IVSD: 0.8 CM (ref 0.6–0.9)
ECHO LV MASS 2D: 172.7 G (ref 67–162)
ECHO LV POSTERIOR WALL DIASTOLIC: 0.9 CM (ref 0.6–0.9)
ECHO LV RELATIVE WALL THICKNESS RATIO: 0.33
ECHO LVOT AREA: 3.8 CM2
ECHO LVOT AV VTI INDEX: 0.73
ECHO LVOT DIAM: 2.2 CM
ECHO LVOT MEAN GRADIENT: 1 MMHG
ECHO LVOT PEAK GRADIENT: 2 MMHG
ECHO LVOT PEAK VELOCITY: 0.8 M/S
ECHO LVOT SV: 57.4 ML
ECHO LVOT VTI: 15.1 CM
ECHO MV A VELOCITY: 0.78 M/S
ECHO MV E VELOCITY: 0.5 M/S
ECHO MV E/A RATIO: 0.64
ECHO MV E/E' LATERAL: 6.05
ECHO MV E/E' RATIO (AVERAGED): 7.53
ECHO MV E/E' SEPTAL: 9.01
ECHO PULMONARY ARTERY SYSTOLIC PRESSURE (PASP): 28 MMHG
ECHO PV MAX VELOCITY: 0.6 M/S
ECHO PV MEAN GRADIENT: 1 MMHG
ECHO PV MEAN VELOCITY: 0.4 M/S
ECHO PV PEAK GRADIENT: 1 MMHG
ECHO PV VTI: 10.7 CM
ECHO RA AREA 4C: 10.1 CM2
ECHO RA VOLUME: 19 ML
ECHO RV BASAL DIMENSION: 3.1 CM
ECHO RV FREE WALL PEAK S': 10.7 CM/S
ECHO RV LONGITUDINAL DIMENSION: 6.4 CM
ECHO RV MID DIMENSION: 2.3 CM
ECHO RV TAPSE: 1.8 CM (ref 1.7–?)
ECHO TV REGURGITANT MAX VELOCITY: 2.28 M/S
ECHO TV REGURGITANT PEAK GRADIENT: 21 MMHG

## 2025-01-14 PROCEDURE — 93306 TTE W/DOPPLER COMPLETE: CPT

## 2025-01-14 PROCEDURE — 93306 TTE W/DOPPLER COMPLETE: CPT | Performed by: INTERNAL MEDICINE

## 2025-01-21 ENCOUNTER — CARE COORDINATION (OUTPATIENT)
Dept: CARE COORDINATION | Age: 80
End: 2025-01-21

## 2025-01-21 NOTE — CARE COORDINATION
Ambulatory Care Coordination Note     1/21/2025 3:42 PM     Patient outreach attempt by this ACM today to perform care management follow up . ACM was unable to reach the patient by telephone today;   left voice message requesting a return phone call to this ACM.     ACM: Joyce Craft RN     Care Summary Note: routine ACC outreach. Last successful outreach 1.8.25  Left vm requesting return callback at pt's soonest convenience.  Provided & repeated ACM callback number.     Quick mention of noted visit scheduled w/pulmonology on Thurs    PCP/Specialist follow up:   Future Appointments         Provider Specialty Dept Phone    1/23/2025 9:20 AM David Mackay MD Pulmonology [phone]    2/4/2025 9:30 AM Cameron Wu MD Orthopedic Surgery [phone]            Follow Up:   Plan for next AC outreach in approximately 2 weeks to complete:  - disease specific assessments  - goal progression  - follow-up appointment with PCP .

## 2025-01-23 ENCOUNTER — OFFICE VISIT (OUTPATIENT)
Dept: PULMONOLOGY | Age: 80
End: 2025-01-23
Payer: MEDICARE

## 2025-01-23 VITALS
OXYGEN SATURATION: 94 % | HEIGHT: 67 IN | DIASTOLIC BLOOD PRESSURE: 68 MMHG | SYSTOLIC BLOOD PRESSURE: 90 MMHG | HEART RATE: 90 BPM | WEIGHT: 150.2 LBS | BODY MASS INDEX: 23.57 KG/M2

## 2025-01-23 DIAGNOSIS — C78.00 COLON CANCER METASTASIZED TO LUNG (HCC): ICD-10-CM

## 2025-01-23 DIAGNOSIS — C18.9 COLON CANCER METASTASIZED TO LUNG (HCC): ICD-10-CM

## 2025-01-23 DIAGNOSIS — J44.9 COPD, MILD (HCC): ICD-10-CM

## 2025-01-23 DIAGNOSIS — R06.09 DOE (DYSPNEA ON EXERTION): Primary | ICD-10-CM

## 2025-01-23 PROCEDURE — 99213 OFFICE O/P EST LOW 20 MIN: CPT | Performed by: INTERNAL MEDICINE

## 2025-01-23 PROCEDURE — 1123F ACP DISCUSS/DSCN MKR DOCD: CPT | Performed by: INTERNAL MEDICINE

## 2025-01-23 PROCEDURE — 1159F MED LIST DOCD IN RCRD: CPT | Performed by: INTERNAL MEDICINE

## 2025-01-23 NOTE — PROGRESS NOTES
[facility]    Outpatient Follow Up Note    Subjective:   CHIEF COMPLAINT / HPI:     The patient is 79 y.o. female who is here for follow up of CRAVEN in the setting of mild COPD. Last visit I ordered an ECHO that was unremarkable and a V/Q scan that shows obtrcutive lung disease bit no large v/Q mismatch. She states she still has dyspnea daily with her routine ADL's. It is not necessarily only with exertion and she says they only time she does not get dyspneic is when she lies down to sleep. No cough, wheezing ro chest tightness. She is using Trelegy daily but not rinsing her mouth out. She states the her breathing is still 30% better than prior to her trelegy. She notes she is not very active    December 24, 2024  Cindy presents today for a new patient visit for evaluation of progressive dyspnea on exertion over the last year.  Her dyspnea on exertion can happen at rest but is common walking room to room in her apartment. She has some associated chest tightness and some chronic lower extremity swelling worse on the left.  Doppler ultrasound in the spring was negative for DVT.  She broke hip 2 months ago but CRAVEN started prior but worse now.  No purulent cough or orthopnea    Cindy has never been seen by a pulmonologist before but does have a history of COPD which is mild by PFTs and she was started on Trelegy a few months back.  She states that her dyspnea on exertion is approximately 30% better with Trelegy.  She has a past medical history for stage IV colon cancer diagnosed in 2022 with mets to the liver and now presumed metastasis to her lung. Previous therapy for metastatic colon cancer: adjuvant FOLFOX every 2 weeks for 12 treatments--finished in May 2023. She then developed an isolated liver lesion in October 2023. She completed SBRT in October 2023. She then completed 8 cycles of FOLFIRI plus Avastin with her last on 2/12/ 2024.  She also has a history of CHF diagnosed

## 2025-02-04 ENCOUNTER — CARE COORDINATION (OUTPATIENT)
Dept: CARE COORDINATION | Age: 80
End: 2025-02-04

## 2025-02-04 NOTE — CARE COORDINATION
Ambulatory Care Coordination Note     2025 2:17 PM     Patient Current Location:  Home: [address] [place] [id number]     ACM contacted the patient by telephone. Verified name and  with patient as identifiers.         ACM: Joyce Craft RN     Challenges to be reviewed by the provider   Additional needs identified to be addressed with provider No  none               Method of communication with provider: none.    Utilization: Patient has not had any utilization since our last call.     Care Summary Note: pt reports doing well, beginning pulmonary class 2.13.25. Denies requiring higher frequency ACC outreach and endorses readiness to graduate ACC by 3.12.25 as planned.     Offered patient enrollment in the Remote Patient Monitoring (RPM) program for in-home monitoring: Yes, but did not enroll at this time: already monitoring with home equipment.     Assessments Completed:   Congestive Heart Failure Assessment    Are you currently restricting fluids?: No Restriction  Do you understand a low sodium diet?: Yes  Do you understand how to read food labels?: Yes  How many restaurant meals do you eat per week?: 0  Do you salt your food before tasting it?: No     No patient-reported symptoms      Symptoms:           ,   General Assessment    Do you have any symptoms that are causing concern?: No          Medications Reviewed:   Patient denies any changes with medications and reports taking all medications as prescribed.    Advance Care Planning:   Reviewed during previous call   Not reviewed during this call     Care Planning:   Education Documentation  Influenza Vaccine, taught by Joyce Craft, RN at 2025  2:17 PM.  Learner: Patient  Readiness: Acceptance  Method: Explanation, Handout, Teachback  Response: Verbalizes Understanding    Pneumovax Recommendation, taught by Joyce Craft, RN at 2025  2:17 PM.  Learner: Patient  Readiness: Acceptance  Method: Explanation, Handout,

## 2025-02-25 ENCOUNTER — CARE COORDINATION (OUTPATIENT)
Dept: CARE COORDINATION | Age: 80
End: 2025-02-25

## 2025-02-25 NOTE — CARE COORDINATION
Ambulatory Care Coordination Note     2/25/2025 4:20 PM     Patient outreach attempt by this ACM today to perform care management follow up . ACM was unable to reach the patient by telephone today;   left voice message requesting a return phone call to this ACM.     ACM: Jocye Craft RN     Care Summary Note: left vm requesting return callback at pt/family's soonest convenience.  Provided & repeated ACM callback number.    PCP/Specialist follow up:   Future Appointments         Provider Specialty Dept Phone    3/13/2025 1:30 PM Talia Grande RN Cardiac Rehabilitation [phone]    3/17/2025 1:20 PM (Arrive by 12:05 PM) [place] [address] Radiology [phone]    3/25/2025 1:30 PM Cameron Wu MD Orthopedic Surgery [phone]    4/3/2025 11:20 AM David Mackay MD Pulmonology [phone]            Follow Up:   Plan for next AC outreach in approximately 1 week to complete:  - disease specific assessments  - goal progression  - follow-up appointment with PCP .

## 2025-03-04 ENCOUNTER — CARE COORDINATION (OUTPATIENT)
Dept: CARE COORDINATION | Age: 80
End: 2025-03-04

## 2025-03-04 NOTE — CARE COORDINATION
Ambulatory Care Coordination Note     3/4/2025 2:54 PM     Patient outreach attempt by this ACM today to perform care management follow up . ACM was unable to reach the patient, family, pt's granddaughter  by telephone today;   left voice message requesting a return phone call to this ACM.     ACM: Joyce Craft RN     Care Summary Note: cont'd effort for routine ACC outreach. Last successful ACC outreach was 2.4.25. On 2.4.25 outreach, pt/family endorses readiness to graduate ACC by 3.12.25.    Left vm reminding ACM remains on care team through 3.12.25 as previously agreed. Invited/encouraged return callback to ACM, at pt/family soonest opportunity. Advised, in absence of return callback from pt/family sooner - ACM will attempt pt/family for final ACC outreach in coming week.  Provided & repeated ACM callback number    PCP/Specialist follow up:   Future Appointments         Provider Specialty Dept Phone    3/13/2025 1:30 PM Talia Grande RN Cardiac Rehabilitation [phone]    3/17/2025 1:20 PM (Arrive by 12:05 PM) [facility] 2 Radiology [phone]    3/25/2025 1:30 PM Cameron Wu MD Orthopedic Surgery [phone]    4/3/2025 11:20 AM David Mackay MD Pulmonology [phone]            Follow Up:   Plan for next ACM outreach in approximately 1 week to complete:  FINAL ACC outreach; Graduate ACC by 3.12.25 .

## 2025-03-12 ENCOUNTER — CARE COORDINATION (OUTPATIENT)
Dept: CARE COORDINATION | Age: 80
End: 2025-03-12

## 2025-03-12 NOTE — CARE COORDINATION
Ambulatory Care Coordination Note     3/12/2025 2:58 PM  FINAL ACC Outreach for current ACC program     patient outreach attempt by this ACM today to perform care management follow up . ACM was unable to reach the patient by telephone today;   Per review w/pt on 2.4.25 - left detailed vm this final ACC outreach; advised pt will graduate ACC as of today, in absence of recent return callback and that pt has previously verbalized readiness and agreement with plan to graduate by 3.12.25  Provided & repeated ACM callback number if any questions or concerns about this vm.  Extended pt open invitation to outreach to this writer for ACC support, in the future, as needed.    Patient graduated from the High Risk Care Management program on 3/12/2025.  Patient has the ability to self manage at this time. reinforced resources provided during this care transition period..  Care management goals have been completed. No further Ambulatory Care Manager follow up scheduled.

## 2025-03-17 ENCOUNTER — HOSPITAL ENCOUNTER (OUTPATIENT)
Dept: CT IMAGING | Age: 80
Discharge: HOME | End: 2025-03-17
Attending: INTERNAL MEDICINE
Payer: MEDICARE

## 2025-03-17 DIAGNOSIS — C18.6 MALIGNANT NEOPLASM OF DESCENDING COLON (HCC): ICD-10-CM

## 2025-03-17 LAB
PERFORMED ON: NORMAL
POC CREATININE: 0.7 MG/DL (ref 0.6–1.2)
POC SAMPLE TYPE: NORMAL

## 2025-03-17 PROCEDURE — 82565 ASSAY OF CREATININE: CPT

## 2025-03-17 PROCEDURE — 74177 CT ABD & PELVIS W/CONTRAST: CPT

## 2025-03-17 PROCEDURE — 6360000004 HC RX CONTRAST MEDICATION: Performed by: INTERNAL MEDICINE

## 2025-03-17 RX ORDER — IOPAMIDOL 755 MG/ML
75 INJECTION, SOLUTION INTRAVASCULAR
Status: COMPLETED | OUTPATIENT
Start: 2025-03-17 | End: 2025-03-17

## 2025-03-17 RX ORDER — IOPAMIDOL 612 MG/ML
50 INJECTION, SOLUTION INTRAVASCULAR
Status: COMPLETED | OUTPATIENT
Start: 2025-03-17 | End: 2025-03-17

## 2025-03-17 RX ADMIN — IOPAMIDOL 75 ML: 755 INJECTION, SOLUTION INTRAVENOUS at 12:31

## 2025-03-17 RX ADMIN — IOPAMIDOL 50 ML: 612 INJECTION, SOLUTION INTRAVENOUS at 12:31

## 2025-04-03 RX ORDER — FLUTICASONE FUROATE, UMECLIDINIUM BROMIDE AND VILANTEROL TRIFENATATE 200; 62.5; 25 UG/1; UG/1; UG/1
1 POWDER RESPIRATORY (INHALATION) DAILY
Qty: 60 EACH | Refills: 5 | Status: SHIPPED | OUTPATIENT
Start: 2025-04-03

## 2025-04-10 ENCOUNTER — OFFICE VISIT (OUTPATIENT)
Dept: ENT CLINIC | Age: 80
End: 2025-04-10
Payer: MEDICARE

## 2025-04-10 VITALS
DIASTOLIC BLOOD PRESSURE: 70 MMHG | WEIGHT: 148 LBS | HEART RATE: 97 BPM | OXYGEN SATURATION: 94 % | BODY MASS INDEX: 23.17 KG/M2 | SYSTOLIC BLOOD PRESSURE: 109 MMHG

## 2025-04-10 DIAGNOSIS — J30.0 VASOMOTOR RHINITIS: ICD-10-CM

## 2025-04-10 DIAGNOSIS — J30.9 ALLERGIC RHINITIS, UNSPECIFIED SEASONALITY, UNSPECIFIED TRIGGER: Primary | ICD-10-CM

## 2025-04-10 PROCEDURE — 1123F ACP DISCUSS/DSCN MKR DOCD: CPT | Performed by: OTOLARYNGOLOGY

## 2025-04-10 PROCEDURE — 1159F MED LIST DOCD IN RCRD: CPT | Performed by: OTOLARYNGOLOGY

## 2025-04-10 PROCEDURE — 99213 OFFICE O/P EST LOW 20 MIN: CPT | Performed by: OTOLARYNGOLOGY

## 2025-04-10 RX ORDER — FLUTICASONE PROPIONATE 50 MCG
1 SPRAY, SUSPENSION (ML) NASAL DAILY
Qty: 32 G | Refills: 1 | Status: SHIPPED | OUTPATIENT
Start: 2025-04-10

## 2025-04-10 NOTE — PROGRESS NOTES
service.    This note was generated completely or in part utilizing Dragon dictation speech recognition software.  Occasionally, words are mistranscribed and despite editing, the text may contain inaccuracies due to incorrect word recognition.  If further clarification is needed please contact the office at [phone].

## 2025-05-08 ENCOUNTER — OFFICE VISIT (OUTPATIENT)
Dept: ENT CLINIC | Age: 80
End: 2025-05-08
Payer: MEDICARE

## 2025-05-08 VITALS
HEIGHT: 67 IN | WEIGHT: 152 LBS | HEART RATE: 91 BPM | SYSTOLIC BLOOD PRESSURE: 121 MMHG | BODY MASS INDEX: 23.86 KG/M2 | DIASTOLIC BLOOD PRESSURE: 75 MMHG

## 2025-05-08 DIAGNOSIS — J30.9 ALLERGIC RHINITIS, UNSPECIFIED SEASONALITY, UNSPECIFIED TRIGGER: Primary | ICD-10-CM

## 2025-05-08 DIAGNOSIS — J30.0 VASOMOTOR RHINITIS: ICD-10-CM

## 2025-05-08 PROCEDURE — 99213 OFFICE O/P EST LOW 20 MIN: CPT | Performed by: OTOLARYNGOLOGY

## 2025-05-08 PROCEDURE — 1123F ACP DISCUSS/DSCN MKR DOCD: CPT | Performed by: OTOLARYNGOLOGY

## 2025-05-08 PROCEDURE — 1159F MED LIST DOCD IN RCRD: CPT | Performed by: OTOLARYNGOLOGY

## 2025-05-08 NOTE — PROGRESS NOTES
[facility]  DIVISION OF OTOLARYNGOLOGY- HEAD & NECK SURGERY  Follow up      Patient Name: Cindy Pérez  Medical Record Number:  [id number]  Primary Care Physician:  Rafal Gauthier MD  Date of Consultation: 5/8/2025    Chief Complaint: Runny nose        Interval History    Patient following up for her nose.  I saw her in April and restarted both Flonase and Atrovent.  She says it is a lot better.          REVIEW OF SYSTEMS  As above    PHYSICAL EXAM  GENERAL: No Acute Distress, Alert and Oriented, no Hoarseness, strong voice  EYES: EOMI, Anti-icteric  HENT:   Head: Normocephalic and atraumatic.   Face:  Symmetric, facial nerve intact, no sinus tenderness  Right Ear: Normal external ear, normal external auditory canal, intact tympanic membrane with normal mobility and aerated middle ear  Left Ear: Normal external ear, normal external auditory canal, intact tympanic membrane with normal mobility and aerated middle ear  Mouth/Oral Cavity:  normal lips, Uvula is midline, no mucosal lesions, no trismus  Oropharynx/Larynx:  normal oropharynx,  Nose:Normal external nasal appearance.  Anterior rhinoscopy shows  no polyps or purulent drainage  NECK: Normal range of motion, no thyromegaly, trachea is midline, no lymphadenopathy, no neck masses, no crepitus              ASSESSMENT/PLAN  1. Allergic rhinitis, unspecified seasonality, unspecified trigger  She is doing better with a combination of Flonase and Atrovent nasal spray.  She will continue this.  She will follow-up in a year.  2. Vasomotor rhinitis  As above             I have performed a head and neck physical exam personally or was physically present during the key or critical portions of the service.    This note was generated completely or in part utilizing Dragon dictation speech recognition software.  Occasionally, words are mistranscribed and despite editing, the text may contain inaccuracies due to incorrect word recognition.  If further

## 2025-06-23 ENCOUNTER — HOSPITAL ENCOUNTER (OUTPATIENT)
Dept: CT IMAGING | Age: 80
Discharge: HOME | End: 2025-06-23
Attending: RADIOLOGY
Payer: MEDICARE

## 2025-06-23 DIAGNOSIS — C18.6 MALIGNANT NEOPLASM OF DESCENDING COLON (HCC): ICD-10-CM

## 2025-06-23 LAB
PERFORMED ON: ABNORMAL
POC CREATININE: 0.4 MG/DL (ref 0.6–1.2)
POC SAMPLE TYPE: ABNORMAL

## 2025-06-23 PROCEDURE — 6360000004 HC RX CONTRAST MEDICATION: Performed by: RADIOLOGY

## 2025-06-23 PROCEDURE — 74177 CT ABD & PELVIS W/CONTRAST: CPT

## 2025-06-23 PROCEDURE — 82565 ASSAY OF CREATININE: CPT

## 2025-06-23 RX ORDER — IOPAMIDOL 755 MG/ML
75 INJECTION, SOLUTION INTRAVASCULAR
Status: COMPLETED | OUTPATIENT
Start: 2025-06-23 | End: 2025-06-23

## 2025-06-23 RX ORDER — IOPAMIDOL 612 MG/ML
50 INJECTION, SOLUTION INTRAVASCULAR
Status: COMPLETED | OUTPATIENT
Start: 2025-06-23 | End: 2025-06-23

## 2025-06-23 RX ADMIN — IOPAMIDOL 50 ML: 612 INJECTION, SOLUTION INTRAVENOUS at 12:07

## 2025-06-23 RX ADMIN — IOPAMIDOL 75 ML: 755 INJECTION, SOLUTION INTRAVENOUS at 12:07

## 2025-07-23 ENCOUNTER — ENROLLMENT (OUTPATIENT)
Dept: CARE COORDINATION | Age: 80
End: 2025-07-23

## 2025-07-25 ENCOUNTER — CARE COORDINATION (OUTPATIENT)
Dept: CARE COORDINATION | Age: 80
End: 2025-07-25

## 2025-07-25 NOTE — CARE COORDINATION
Ambulatory Care Coordination Note     7/25/2025 4:27 PM     Patient outreach attempt by this ACM today to offer care management services. ACM was unable to reach the patient by telephone today;   left voice message requesting a return phone call to this ACM.     ACM: Joyce Craft RN     Care Summary Note: Initial ACC outreach. Left vm  -   Offered to pt quick recall - ACM is calling, as extension of PCP care team.  (Of note- pt has engaged in ACC in past)  Invited pt to revisit added support of ACC.  Requested return callback, at pt's soonest convenience, to discuss ACC.  Provided & repeated ACM callback number, w/mention of availability Mon-Fri 8a-4pm      PCP/Specialist follow up:   Future Appointments         Provider Specialty Dept Phone    11/7/2025 11:00 AM Coleman Mcclellan MD Otolaryngology [phone]            Follow Up:   Plan for next ACM outreach in approximately 3-5 business days to complete:  - outreach attempt to offer care management services.

## 2025-07-30 ENCOUNTER — CARE COORDINATION (OUTPATIENT)
Dept: CARE COORDINATION | Age: 80
End: 2025-07-30

## 2025-07-30 NOTE — CARE COORDINATION
Ambulatory Care Coordination Note     7/30/2025 2:45 PM     Patient outreach attempt by this ACM today to offer care management services. ACM was unable to reach the patient by telephone today;   left voice message requesting a return phone call to this ACM.     ACM: Joyce Craft RN     Care Summary Note: cont'd attempt in outreach to engage for ACC enrollment  Provided & repeated ACM callback number.    PCP/Specialist follow up:       Follow Up:   Plan for next ACM outreach in approximately 1-2 days  to complete:  - outreach attempt to offer care management services.

## 2025-07-31 ENCOUNTER — CARE COORDINATION (OUTPATIENT)
Dept: CARE COORDINATION | Age: 80
End: 2025-07-31

## 2025-07-31 NOTE — CARE COORDINATION
Ambulatory Care Coordination Note     7/31/2025 2:03 PM     Patient outreach attempt by this ACM today to offer care management services. ACM was unable to reach the patient by telephone today;   left voice message requesting a return phone call to this ACM.     ACM: Joyce Craft RN     Care Summary Note: cont'd attempt to reach for ACC enrollment.  Left subsequent vm in review of previous messages left this week.  Provided & repeated ACM callback number    PCP/Specialist follow up:       Follow Up:   Plan for next ACM outreach in approximately 1-2 days  to complete:  4th/FINAL- outreach attempt to offer care management services.

## 2025-08-01 ENCOUNTER — CARE COORDINATION (OUTPATIENT)
Dept: CARE COORDINATION | Age: 80
End: 2025-08-01

## 2025-08-04 RX ORDER — FLUTICASONE PROPIONATE 50 MCG
SPRAY, SUSPENSION (ML) NASAL
Qty: 32 G | Refills: 1 | Status: SHIPPED | OUTPATIENT
Start: 2025-08-04

## (undated) DEVICE — TRANSFER SET 3": Brand: MEDLINE INDUSTRIES, INC.

## (undated) DEVICE — RELOAD STPL L75MM OPN H3.8MM CLS 1.5MM WIRE DIA0.2MM REG

## (undated) DEVICE — GLOVE ORANGE PI 7   MSG9070

## (undated) DEVICE — PORT INSERTION: Brand: MEDLINE INDUSTRIES, INC.

## (undated) DEVICE — PAD,ABDOMINAL,8"X7.5",STERILE,LF,1/PK: Brand: MEDLINE

## (undated) DEVICE — GLOVE SURG SZ 7 CRM LTX FREE POLYISOPRENE POLYMER BEAD ANTI

## (undated) DEVICE — FEMORAL CANAL TIP, IRRIGATION/SUCTION

## (undated) DEVICE — LAMINECTOMY PK

## (undated) DEVICE — Device

## (undated) DEVICE — PILLOW POS W15XH6XL22IN RASPBERRY FOAM ABD W/ STRP DISP FOR

## (undated) DEVICE — SUTURE VCRL SZ 2-0 L18IN ABSRB UD CT-1 L36MM 1/2 CIR J839D

## (undated) DEVICE — ADHESIVE SKIN CLOSURE XL 42 CM 2.7 CC MESH LIQUIBAND SECUR

## (undated) DEVICE — SUTURE VCRL SZ 2-0 L27IN ABSRB UD L26MM SH 1/2 CIR J417H

## (undated) DEVICE — SOLUTION IRRIG 1000ML 0.9% SOD CHL USP POUR PLAS BTL

## (undated) DEVICE — TROCAR: Brand: KII FIOS FIRST ENTRY

## (undated) DEVICE — MERCY HEALTH WEST TURNOVER: Brand: MEDLINE INDUSTRIES, INC.

## (undated) DEVICE — ADHESIVE SKIN CLSR 0.7ML TOP DERMBND ADV

## (undated) DEVICE — GLOVE SURG SZ 65 L12IN FNGR THK87MIL WHT LTX FREE

## (undated) DEVICE — NEEDLE INSUF L150MM DIA2MM DISP FOR PNEUMOPERI ENDOPATH

## (undated) DEVICE — PROGRASP FORCEPS: Brand: ENDOWRIST

## (undated) DEVICE — SUTURE BOOT: Brand: DEROYAL

## (undated) DEVICE — UNDERGLOVE SURG SZ 8 BLU LTX FREE SYN POLYISOPRENE POLYMER

## (undated) DEVICE — SEALER/DIVIDER LAP SHFT L44CM JAW APER 11.4MM 315DEG ROT

## (undated) DEVICE — BLANKET WRM W40.2XL55.9IN IORT LO BODY + MISTRAL AIR

## (undated) DEVICE — SOLUTION INJ LR VISIV 1000ML BG

## (undated) DEVICE — SURGICAL SET UP - SURE SET: Brand: MEDLINE INDUSTRIES, INC.

## (undated) DEVICE — BOWL MED L 32OZ PLAS W/ MOLD GRAD EZ OPN PEEL PCH

## (undated) DEVICE — SUTURE MONOCRYL STRATAFIX SPRL SZ 3-0 L12IN ABSRB UD FS-1 L30X30CM SXMP2B410

## (undated) DEVICE — CANNULA SAMP CO2 AD GRN 7FT CO2 AND 7FT O2 TBNG UNIV CONN

## (undated) DEVICE — STAPLER SKIN H3.9MM WIRE DIA0.58MM CRWN 6.9MM 35 STPL ROT

## (undated) DEVICE — SUTURE VICRYL + 1 L27IN ABSRB UD CT-1 L36MM 1/2 CIR TAPR PNT VCP261H

## (undated) DEVICE — PUMP SUC IRR TBNG L10FT W/ HNDPC ASSEMB STRYKEFLOW 2

## (undated) DEVICE — MAYFIELD® DISPOSABLE ADULT SKULL PIN (PLASTIC BASE): Brand: MAYFIELD®

## (undated) DEVICE — NEEDLE HYPO 22GA L1.5IN BLK POLYPR HUB S STL REG BVL STR

## (undated) DEVICE — ANTI-EMBOLISM STOCKINGS,THIGH LENGTH,LARGE-LONG-SIZE J: Brand: T.E.D.

## (undated) DEVICE — PAD,NON-ADHERENT,3X8,STERILE,LF,1/PK: Brand: MEDLINE

## (undated) DEVICE — SURE SET-DOUBLE BASIN-LF: Brand: MEDLINE INDUSTRIES, INC.

## (undated) DEVICE — COVER LT HNDL CAM BLU DISP W/ SURG CTRL

## (undated) DEVICE — HYPODERMIC SAFETY NEEDLE: Brand: MONOJECT

## (undated) DEVICE — HANDPIECE SET WITH HIGH FLOW TIP AND SUCTION TUBE: Brand: INTERPULSE

## (undated) DEVICE — SUTURE MCRYL SZ 4-0 L27IN ABSRB UD L19MM PS-2 1/2 CIR PRIM Y426H

## (undated) DEVICE — 3M™ STERI-DRAPE™ U-DRAPE 1015: Brand: STERI-DRAPE™

## (undated) DEVICE — BLADELESS OBTURATOR: Brand: WECK VISTA

## (undated) DEVICE — BLADELESS OBTURATOR, LONG: Brand: WECK VISTA

## (undated) DEVICE — ELECTRODE BLDE L6.5IN CAUT EXT DISP

## (undated) DEVICE — SYSTEM SMK EVAC LAP TBNG FILTER HSNG BENT STYL PNK SEE CLR

## (undated) DEVICE — TOWEL,STOP FLAG GOLD N-W: Brand: MEDLINE

## (undated) DEVICE — 3M™ DURAPORE™ SURGICAL TAPE 1538-3, 3 INCH X 10 YARD (7,5CM X 9,1M), 4 ROLLS/BOX: Brand: 3M™ DURAPORE™

## (undated) DEVICE — SUTURE VCRL SZ 3-0 L18IN ABSRB UD L26MM SH 1/2 CIR J864D

## (undated) DEVICE — SUTURE VCRL SZ 0 L18IN ABSRB UD L36MM CT-1 1/2 CIR J840D

## (undated) DEVICE — 450 ML BOTTLE OF 0.05% CHLORHEXIDINE GLUCONATE IN 99.95% STERILE WATER FOR IRRIGATION, USP AND APPLICATOR.: Brand: IRRISEPT ANTIMICROBIAL WOUND LAVAGE

## (undated) DEVICE — NEURO SPONGES: Brand: DEROYAL

## (undated) DEVICE — ADHESIVE SKIN CLOSURE WND 8.661X1.5 IN 22 CM LIQUIBAND SECUR

## (undated) DEVICE — 40583 XL ADVANCED TRENDELENBURG POSITIONING KIT: Brand: 40583 XL ADVANCED TRENDELENBURG POSITIONING KIT

## (undated) DEVICE — CANNULA SEAL

## (undated) DEVICE — GLOVE SURG SZ 85 L12IN FNGR ORTHO 126MIL CRM LTX FREE

## (undated) DEVICE — SYRINGE MED 10ML SLIP TIP BLNT FILL AND LUERLOCK DISP

## (undated) DEVICE — GLOVE SURG SZ 6 L12IN FNGR THK75MIL WHT LTX POLYMER BEAD

## (undated) DEVICE — SOLUTION ANTIFOG VIS SYS CLEARIFY LAPSCP

## (undated) DEVICE — ARM DRAPE

## (undated) DEVICE — DRAPE,HIP,W/POUCHES,STERILE: Brand: MEDLINE

## (undated) DEVICE — SPONGE,LAP,18"X18",DLX,XR,ST,5/PK,40/PK: Brand: MEDLINE

## (undated) DEVICE — TOTAL TRAY, 16FR 10ML SIL FOLEY, URN: Brand: MEDLINE

## (undated) DEVICE — VESSEL SEALER EXTEND: Brand: ENDOWRIST

## (undated) DEVICE — SPONGE GZ W4XL4IN COT 12 PLY TYP VII WVN C FLD DSGN STERILE

## (undated) DEVICE — TROCAR: Brand: KII OPTICAL ACCESS SYSTEM

## (undated) DEVICE — SPONGE,LAP,4"X18",XR,ST,5/PK,40PK/CS: Brand: MEDLINE INDUSTRIES, INC.

## (undated) DEVICE — BINDER ABD H12IN FOR 62-74IN WAIST UNIV 4 PNL PREM DSGN E

## (undated) DEVICE — CLEANER,CAUTERY TIP,2X2",STERILE: Brand: MEDLINE

## (undated) DEVICE — 2108 SERIES SAGITTAL BLADE AGGRESSIVE  (25.0 X 1.19 X 85.0MM)

## (undated) DEVICE — VISIGI 3D®  CALIBRATION SYSTEM  SIZE 36FR STD W/ BULB: Brand: BOEHRINGER® VISIGI 3D™ SLEEVE GASTRECTOMY CALIBRATION SYSTEM, SIZE 36FR W/BULB

## (undated) DEVICE — DRAPE MICSCP W132XL406CM LENS DIA68MM W VARI LENS2 FOR LEICA

## (undated) DEVICE — TROCAR: Brand: KII SLEEVE

## (undated) DEVICE — DEVICE CLSR 10/12MM XL PRT SYS SUT PASS ST DISP CARTER

## (undated) DEVICE — SPONGE GZ W4XL4IN COT 12 PLY TYP VII WVN C FLD DSGN

## (undated) DEVICE — SYRINGE MED 20ML STD CLR PLAS LUERLOCK TIP N CTRL DISP

## (undated) DEVICE — GARMENT,MEDLINE,DVT,INT,CALF,LG, GEN2: Brand: MEDLINE

## (undated) DEVICE — Z DUP USE 2591632 BIT DRL DIA2.4MM ADJ FOR MOUNTAINEER

## (undated) DEVICE — AGENT HEMSTAT W2XL4IN OXIDIZED REGENERATED CELOS ABSRB

## (undated) DEVICE — GARMENT,MEDLINE,DVT,INT,CALF,MED, GEN2: Brand: MEDLINE

## (undated) DEVICE — SYRINGE MED 10ML TRNSLUC BRL PLUNG BLK MRK POLYPR CTRL

## (undated) DEVICE — GOWN,SIRUS,POLYRNF,BRTHSLV,XL,30/CS: Brand: MEDLINE

## (undated) DEVICE — GLOVE SURG SZ 75 L12IN FNGR THK94MIL TRNSLUC YEL LTX

## (undated) DEVICE — LAPAROSCOPIC SCISSORS: Brand: EPIX LAPAROSCOPIC SCISSORS

## (undated) DEVICE — SYRINGE MED 10ML LUERLOCK TIP W/O SFTY DISP

## (undated) DEVICE — ELECTRODE PT RET AD L9FT HI MOIST COND ADH HYDRGEL CORDED

## (undated) DEVICE — ELECTRODE NERVE STIM FOR SPNL CRD MONITORING

## (undated) DEVICE — STAPLER INT L75MM CUT LN L73MM STPL LN L77MM BLU B FRM 8

## (undated) DEVICE — INTENDED FOR TISSUE SEPARATION, AND OTHER PROCEDURES THAT REQUIRE A SHARP SURGICAL BLADE TO PUNCTURE OR CUT.: Brand: BARD-PARKER ® STAINLESS STEEL BLADES

## (undated) DEVICE — LARGE NEEDLE DRIVER: Brand: ENDOWRIST

## (undated) DEVICE — GLOVE SURG SZ 85 L12IN FNGR THK79MIL GRN LTX FREE

## (undated) DEVICE — SUTURE VCRL SZ 4-0 L18IN ABSRB UD L19MM PS-2 3/8 CIR PRIM J496H

## (undated) DEVICE — APPLICATOR MEDICATED 26 CC SOLUTION HI LT ORNG CHLORAPREP

## (undated) DEVICE — LAPAROSCOPIC ACCESS SYSTEM: Brand: ALEXIS LAPAROSCOPIC SYSTEM WITH KII FIOS FIRST ENTRY

## (undated) DEVICE — GLOVE SURG SZ 75 L12IN THK75MIL DK GRN LTX FREE

## (undated) DEVICE — ANTI-FOG SOLUTION WITH FOAM PAD: Brand: DEVON

## (undated) DEVICE — SUTURE VCRL SZ 0 L27IN ABSRB UD L26MM CT-2 1/2 CIR J270H

## (undated) DEVICE — GARMENT COMPR STD FOR 17IN CALF UNIF THER FLOTRN

## (undated) DEVICE — Device: Brand: SPOT EX ENDOSCOPIC TATTOO

## (undated) DEVICE — SOLUTION IRRIG 3000ML 0.9% SOD CHL USP UROMATIC PLAS CONT

## (undated) DEVICE — CONTAINER SPEC 165OZ POLYPR PATH SNAP LOK CAP W/ LID

## (undated) DEVICE — SOLUTION IV 1000ML 0.9% SOD CHL

## (undated) DEVICE — SUTURE VICRYL + SZ 2-0 L27IN ABSRB CLR CT-1 1/2 CIR TAPERCUT VCP259H

## (undated) DEVICE — PLATE ES AD W 9FT CRD 2

## (undated) DEVICE — THE ARTICULATOR INJECTION NEEDLE IS A SINGLE USE, DISPOSABLE, FLEXIBLE SHEATH INJECTION NEEDLE USED FOR THE INJECTION OF VARIOUS TYPES OF MEDIA THROUGH FLEXIBLE ENDOSCOPES.: Brand: ARTICULATOR

## (undated) DEVICE — 60 ML SYRINGE,CATHETER TIP: Brand: MONOJECT

## (undated) DEVICE — TOOL 14MH30 LEGEND 14CM 3MM: Brand: MIDAS REX ™

## (undated) DEVICE — ROBOTIC: Brand: MEDLINE INDUSTRIES, INC.

## (undated) DEVICE — 3M™ COBAN™ NL STERILE NON-LATEX SELF-ADHERENT WRAP, 2086S, 6 IN X 5 YD (15 CM X 4,5 M), 12 ROLLS/CASE: Brand: 3M™ COBAN™

## (undated) DEVICE — CADIERE FORCEPS: Brand: ENDOWRIST

## (undated) DEVICE — FORCEPS BX L240CM JAW DIA2.4MM ORNG L CAP W/ NDL DISP RAD

## (undated) DEVICE — SHEET,DRAPE,53X77,STERILE: Brand: MEDLINE

## (undated) DEVICE — PRE OP PACK: Brand: MEDLINE INDUSTRIES, INC.

## (undated) DEVICE — COVER,TABLE,HEAVY DUTY,77"X90",STRL: Brand: MEDLINE

## (undated) DEVICE — TOTAL HIP: Brand: MEDLINE INDUSTRIES, INC.

## (undated) DEVICE — CHLORAPREP 26ML ORANGE

## (undated) DEVICE — JEWISH HOSPITAL TURNOVER KIT: Brand: MEDLINE INDUSTRIES, INC.

## (undated) DEVICE — SUTURE VCRL SZ 0 L54IN ABSRB VLT W/O NDL POLYGLACTIN 910 J616H

## (undated) DEVICE — CABLE BPLR L12FT FLYING LD DISPOSABLE

## (undated) DEVICE — BLADE ES ELASTOMERIC COAT INSUL DURABLE BEND UPTO 90DEG

## (undated) DEVICE — 1010 S-DRAPE TOWEL DRAPE 10/BX: Brand: STERI-DRAPE™